# Patient Record
Sex: MALE | Race: WHITE | Employment: OTHER | URBAN - METROPOLITAN AREA
[De-identification: names, ages, dates, MRNs, and addresses within clinical notes are randomized per-mention and may not be internally consistent; named-entity substitution may affect disease eponyms.]

---

## 2018-02-14 PROBLEM — E53.8 FOLATE DEFICIENCY: Status: ACTIVE | Noted: 2018-02-14

## 2018-02-14 PROBLEM — B19.20 HEPATITIS C VIRUS INFECTION WITHOUT HEPATIC COMA: Status: ACTIVE | Noted: 2018-02-14

## 2018-02-14 PROBLEM — F17.210 CIGARETTE NICOTINE DEPENDENCE WITHOUT COMPLICATION: Status: ACTIVE | Noted: 2018-02-14

## 2018-02-14 PROBLEM — K21.9 GASTROESOPHAGEAL REFLUX DISEASE: Status: ACTIVE | Noted: 2018-02-14

## 2018-02-14 PROBLEM — E55.9 VITAMIN D DEFICIENCY: Status: ACTIVE | Noted: 2018-02-14

## 2018-02-14 PROBLEM — E78.1 HYPERTRIGLYCERIDEMIA: Status: ACTIVE | Noted: 2018-02-14

## 2018-02-27 PROBLEM — K21.9 GASTROESOPHAGEAL REFLUX DISEASE: Status: ACTIVE | Noted: 2018-02-27

## 2018-03-02 PROBLEM — K21.9 GASTROESOPHAGEAL REFLUX DISEASE: Chronic | Status: ACTIVE | Noted: 2018-02-14

## 2018-03-02 PROBLEM — E55.9 VITAMIN D DEFICIENCY: Chronic | Status: ACTIVE | Noted: 2018-02-14

## 2018-03-02 PROBLEM — E78.1 HYPERTRIGLYCERIDEMIA: Chronic | Status: ACTIVE | Noted: 2018-02-14

## 2018-03-02 PROBLEM — R07.89 LEFT-SIDED CHEST WALL PAIN: Status: ACTIVE | Noted: 2018-03-02

## 2018-03-02 PROBLEM — J11.1 BRONCHITIS WITH INFLUENZA: Status: ACTIVE | Noted: 2018-03-02

## 2018-03-02 PROBLEM — J44.1 COPD WITH ACUTE EXACERBATION (HCC): Status: ACTIVE | Noted: 2018-03-02

## 2018-03-02 PROBLEM — F31.9 BIPOLAR DISORDER WITH DEPRESSION (HCC): Chronic | Status: ACTIVE | Noted: 2018-03-02

## 2018-03-15 ENCOUNTER — OFFICE VISIT (OUTPATIENT)
Dept: FAMILY MEDICINE CLINIC | Age: 53
End: 2018-03-15
Payer: COMMERCIAL

## 2018-03-15 ENCOUNTER — HOSPITAL ENCOUNTER (OUTPATIENT)
Age: 53
Discharge: HOME | End: 2018-03-17
Payer: COMMERCIAL

## 2018-03-15 VITALS
DIASTOLIC BLOOD PRESSURE: 60 MMHG | WEIGHT: 229 LBS | HEART RATE: 97 BPM | SYSTOLIC BLOOD PRESSURE: 116 MMHG | HEIGHT: 71 IN | OXYGEN SATURATION: 95 % | BODY MASS INDEX: 32.06 KG/M2

## 2018-03-15 DIAGNOSIS — R79.89 ELEVATED SERUM CREATININE: ICD-10-CM

## 2018-03-15 DIAGNOSIS — F17.210 CIGARETTE NICOTINE DEPENDENCE WITHOUT COMPLICATION: Primary | ICD-10-CM

## 2018-03-15 DIAGNOSIS — R09.81 NASAL CONGESTION: ICD-10-CM

## 2018-03-15 DIAGNOSIS — R79.89 ELEVATED LFTS: ICD-10-CM

## 2018-03-15 DIAGNOSIS — R05.8 PRODUCTIVE COUGH: ICD-10-CM

## 2018-03-15 LAB
ALBUMIN SERPL-MCNC: 4.4 G/DL (ref 3.5–5.2)
ALP BLD-CCNC: 62 U/L (ref 40–129)
ALT SERPL-CCNC: 29 U/L (ref 0–40)
ANION GAP SERPL CALCULATED.3IONS-SCNC: 15 MMOL/L (ref 7–16)
AST SERPL-CCNC: 20 U/L (ref 0–39)
BILIRUB SERPL-MCNC: 0.3 MG/DL (ref 0–1.2)
BUN BLDV-MCNC: 10 MG/DL (ref 6–20)
CALCIUM SERPL-MCNC: 9.6 MG/DL (ref 8.6–10.2)
CHLORIDE BLD-SCNC: 100 MMOL/L (ref 98–107)
CO2: 22 MMOL/L (ref 22–29)
CREAT SERPL-MCNC: 1 MG/DL (ref 0.7–1.2)
GFR AFRICAN AMERICAN: >60
GFR NON-AFRICAN AMERICAN: >60 ML/MIN/1.73
GLUCOSE BLD-MCNC: 92 MG/DL (ref 74–109)
POTASSIUM SERPL-SCNC: 4.6 MMOL/L (ref 3.5–5)
SODIUM BLD-SCNC: 137 MMOL/L (ref 132–146)
TOTAL PROTEIN: 7.6 G/DL (ref 6.4–8.3)

## 2018-03-15 PROCEDURE — G8417 CALC BMI ABV UP PARAM F/U: HCPCS | Performed by: FAMILY MEDICINE

## 2018-03-15 PROCEDURE — 1111F DSCHRG MED/CURRENT MED MERGE: CPT | Performed by: FAMILY MEDICINE

## 2018-03-15 PROCEDURE — 1036F TOBACCO NON-USER: CPT | Performed by: FAMILY MEDICINE

## 2018-03-15 PROCEDURE — G8482 FLU IMMUNIZE ORDER/ADMIN: HCPCS | Performed by: FAMILY MEDICINE

## 2018-03-15 PROCEDURE — 80053 COMPREHEN METABOLIC PANEL: CPT

## 2018-03-15 PROCEDURE — 3017F COLORECTAL CA SCREEN DOC REV: CPT | Performed by: FAMILY MEDICINE

## 2018-03-15 PROCEDURE — 99213 OFFICE O/P EST LOW 20 MIN: CPT | Performed by: FAMILY MEDICINE

## 2018-03-15 PROCEDURE — G8427 DOCREV CUR MEDS BY ELIG CLIN: HCPCS | Performed by: FAMILY MEDICINE

## 2018-03-15 RX ORDER — GUAIFENESIN 600 MG/1
600 TABLET, EXTENDED RELEASE ORAL 2 TIMES DAILY PRN
Qty: 20 TABLET | Refills: 0 | Status: SHIPPED | OUTPATIENT
Start: 2018-03-15 | End: 2018-04-10

## 2018-03-15 ASSESSMENT — ENCOUNTER SYMPTOMS
COUGH: 1
DIARRHEA: 0
SORE THROAT: 0
TROUBLE SWALLOWING: 0
ABDOMINAL PAIN: 0
SHORTNESS OF BREATH: 1

## 2018-03-26 ENCOUNTER — TELEPHONE (OUTPATIENT)
Dept: FAMILY MEDICINE CLINIC | Age: 53
End: 2018-03-26

## 2018-04-10 ENCOUNTER — OFFICE VISIT (OUTPATIENT)
Dept: FAMILY MEDICINE CLINIC | Age: 53
End: 2018-04-10
Payer: COMMERCIAL

## 2018-04-10 VITALS
OXYGEN SATURATION: 98 % | HEIGHT: 71 IN | BODY MASS INDEX: 32.34 KG/M2 | HEART RATE: 80 BPM | SYSTOLIC BLOOD PRESSURE: 112 MMHG | DIASTOLIC BLOOD PRESSURE: 62 MMHG | WEIGHT: 231 LBS

## 2018-04-10 DIAGNOSIS — K21.9 GASTROESOPHAGEAL REFLUX DISEASE, ESOPHAGITIS PRESENCE NOT SPECIFIED: Chronic | ICD-10-CM

## 2018-04-10 DIAGNOSIS — R79.89 ELEVATED SERUM CREATININE: Primary | ICD-10-CM

## 2018-04-10 DIAGNOSIS — F31.9 BIPOLAR DISORDER WITH DEPRESSION (HCC): Chronic | ICD-10-CM

## 2018-04-10 DIAGNOSIS — E55.9 VITAMIN D DEFICIENCY: Chronic | ICD-10-CM

## 2018-04-10 DIAGNOSIS — R79.89 ELEVATED LFTS: ICD-10-CM

## 2018-04-10 DIAGNOSIS — F17.210 CIGARETTE NICOTINE DEPENDENCE WITHOUT COMPLICATION: ICD-10-CM

## 2018-04-10 PROBLEM — J44.9 COPD (CHRONIC OBSTRUCTIVE PULMONARY DISEASE) (HCC): Chronic | Status: ACTIVE | Noted: 2018-03-02

## 2018-04-10 PROBLEM — J44.9 COPD (CHRONIC OBSTRUCTIVE PULMONARY DISEASE) (HCC): Status: ACTIVE | Noted: 2018-03-02

## 2018-04-10 PROBLEM — B19.20 HEPATITIS C VIRUS INFECTION WITHOUT HEPATIC COMA: Status: RESOLVED | Noted: 2018-02-14 | Resolved: 2018-04-10

## 2018-04-10 PROCEDURE — G8428 CUR MEDS NOT DOCUMENT: HCPCS | Performed by: FAMILY MEDICINE

## 2018-04-10 PROCEDURE — G8417 CALC BMI ABV UP PARAM F/U: HCPCS | Performed by: FAMILY MEDICINE

## 2018-04-10 PROCEDURE — 1036F TOBACCO NON-USER: CPT | Performed by: FAMILY MEDICINE

## 2018-04-10 PROCEDURE — 99213 OFFICE O/P EST LOW 20 MIN: CPT | Performed by: FAMILY MEDICINE

## 2018-04-10 PROCEDURE — 3017F COLORECTAL CA SCREEN DOC REV: CPT | Performed by: FAMILY MEDICINE

## 2018-04-10 RX ORDER — ONDANSETRON 4 MG/1
4 TABLET, FILM COATED ORAL EVERY 8 HOURS PRN
Qty: 20 TABLET | Refills: 0 | Status: SHIPPED
Start: 2018-04-10 | End: 2020-06-11 | Stop reason: SDUPTHER

## 2018-04-10 RX ORDER — TRAZODONE HYDROCHLORIDE 100 MG/1
100 TABLET ORAL NIGHTLY
COMMUNITY

## 2018-04-10 RX ORDER — VENLAFAXINE 75 MG/1
150 TABLET ORAL DAILY
COMMUNITY
End: 2021-01-14

## 2018-04-10 RX ORDER — HYDROXYZINE HYDROCHLORIDE 25 MG/1
25 TABLET, FILM COATED ORAL 3 TIMES DAILY PRN
COMMUNITY
End: 2018-08-31

## 2018-04-10 RX ORDER — ACETAMINOPHEN 160 MG
2000 TABLET,DISINTEGRATING ORAL DAILY
Qty: 30 CAPSULE | Refills: 5 | Status: SHIPPED | OUTPATIENT
Start: 2018-04-10 | End: 2018-10-15 | Stop reason: SDUPTHER

## 2018-04-10 RX ORDER — PANTOPRAZOLE SODIUM 40 MG/1
40 TABLET, DELAYED RELEASE ORAL
Qty: 30 TABLET | Refills: 3 | Status: SHIPPED | OUTPATIENT
Start: 2018-04-10 | End: 2019-01-11 | Stop reason: SDUPTHER

## 2018-04-10 RX ORDER — ONDANSETRON 4 MG/1
4 TABLET, FILM COATED ORAL EVERY 8 HOURS PRN
COMMUNITY
End: 2018-04-10 | Stop reason: SDUPTHER

## 2018-04-10 ASSESSMENT — ENCOUNTER SYMPTOMS
SHORTNESS OF BREATH: 0
ABDOMINAL PAIN: 0
BLOOD IN STOOL: 0

## 2018-07-05 ENCOUNTER — OFFICE VISIT (OUTPATIENT)
Dept: FAMILY MEDICINE CLINIC | Age: 53
End: 2018-07-05
Payer: COMMERCIAL

## 2018-07-05 VITALS
WEIGHT: 221 LBS | SYSTOLIC BLOOD PRESSURE: 116 MMHG | OXYGEN SATURATION: 94 % | DIASTOLIC BLOOD PRESSURE: 64 MMHG | HEART RATE: 72 BPM | BODY MASS INDEX: 30.82 KG/M2

## 2018-07-05 DIAGNOSIS — R09.81 NASAL CONGESTION: ICD-10-CM

## 2018-07-05 DIAGNOSIS — J44.1 COPD EXACERBATION (HCC): Primary | ICD-10-CM

## 2018-07-05 PROCEDURE — 3017F COLORECTAL CA SCREEN DOC REV: CPT | Performed by: FAMILY MEDICINE

## 2018-07-05 PROCEDURE — G8417 CALC BMI ABV UP PARAM F/U: HCPCS | Performed by: FAMILY MEDICINE

## 2018-07-05 PROCEDURE — 3023F SPIROM DOC REV: CPT | Performed by: FAMILY MEDICINE

## 2018-07-05 PROCEDURE — G8427 DOCREV CUR MEDS BY ELIG CLIN: HCPCS | Performed by: FAMILY MEDICINE

## 2018-07-05 PROCEDURE — 99213 OFFICE O/P EST LOW 20 MIN: CPT | Performed by: FAMILY MEDICINE

## 2018-07-05 PROCEDURE — 1036F TOBACCO NON-USER: CPT | Performed by: FAMILY MEDICINE

## 2018-07-05 PROCEDURE — G8926 SPIRO NO PERF OR DOC: HCPCS | Performed by: FAMILY MEDICINE

## 2018-07-05 RX ORDER — BENZONATATE 100 MG/1
100 CAPSULE ORAL 3 TIMES DAILY PRN
Qty: 21 CAPSULE | Refills: 0 | Status: SHIPPED | OUTPATIENT
Start: 2018-07-05 | End: 2018-07-12

## 2018-07-05 RX ORDER — METHYLPREDNISOLONE 4 MG/1
TABLET ORAL
Qty: 1 KIT | Refills: 0 | Status: SHIPPED | OUTPATIENT
Start: 2018-07-05 | End: 2018-07-11

## 2018-07-05 RX ORDER — HYDROXYZINE PAMOATE 50 MG/1
50 CAPSULE ORAL 4 TIMES DAILY PRN
COMMUNITY
End: 2019-12-11

## 2018-07-05 RX ORDER — AZITHROMYCIN 250 MG/1
TABLET, FILM COATED ORAL
Qty: 1 PACKET | Refills: 0 | Status: SHIPPED | OUTPATIENT
Start: 2018-07-05 | End: 2018-07-09

## 2018-07-05 RX ORDER — FLUTICASONE PROPIONATE 50 MCG
1 SPRAY, SUSPENSION (ML) NASAL DAILY
Qty: 1 BOTTLE | Refills: 3 | Status: SHIPPED
Start: 2018-07-05 | End: 2020-09-02 | Stop reason: CLARIF

## 2018-07-05 RX ORDER — LORAZEPAM 0.5 MG/1
0.5 TABLET ORAL DAILY
COMMUNITY
End: 2018-08-31

## 2018-07-05 ASSESSMENT — ENCOUNTER SYMPTOMS
SORE THROAT: 0
COUGH: 1
WHEEZING: 1
RHINORRHEA: 1
SHORTNESS OF BREATH: 1
SINUS PRESSURE: 1

## 2018-07-05 NOTE — PROGRESS NOTES
Subjective:      Patient ID: Ester Hernandez is a 46 y.o. male. Chief Complaint   Patient presents with    Cough     x4 days      HPI    Cough: Onset four days ago. Stable since onset. Cough is productive of green sputum. Took dayquil with minimal effect. Admits to worsening of chronic shortness of breath, chills, rhinorrhea with yellow-green discharge, sinus pressure, sensation that the ears are plugged. Denies myalgia, sore throat. Sick contacts include his niece who had similar symptoms and was treated with antibiotics. Patient is a current smoker, has known history of COPD and reports using rescue inhaler 34 times per day on average. Review of Systems   Constitutional: Positive for chills and fatigue. HENT: Positive for congestion, rhinorrhea and sinus pressure. Negative for sore throat. Respiratory: Positive for cough, shortness of breath and wheezing. Cardiovascular: Negative for chest pain and leg swelling. Musculoskeletal: Negative for myalgias. Objective:   Physical Exam   Constitutional: He is oriented to person, place, and time. No distress. HENT:   Head: Normocephalic and atraumatic. Right Ear: External ear normal. Tympanic membrane is not erythematous and not bulging. Left Ear: External ear normal. Tympanic membrane is not erythematous and not bulging. Nose: Mucosal edema present. Right sinus exhibits maxillary sinus tenderness and frontal sinus tenderness. Left sinus exhibits maxillary sinus tenderness and frontal sinus tenderness. Mouth/Throat: Uvula is midline, oropharynx is clear and moist and mucous membranes are normal.   Eyes: Conjunctivae are normal.   Neck: Neck supple. Pulmonary/Chest: He has decreased breath sounds. He has wheezes. He has no rhonchi. Abdominal: Soft. Bowel sounds are normal. There is no tenderness. Musculoskeletal: He exhibits no edema. Lymphadenopathy:     He has no cervical adenopathy.    Neurological: He is alert and oriented to person, place, and time. Skin: He is not diaphoretic. /64 (Site: Left Arm, Position: Sitting, Cuff Size: Large Adult)   Pulse 72   Wt 221 lb (100.2 kg)   SpO2 94%   BMI 30.82 kg/m²     Assessment / Plan:      1. COPD exacerbation (HCC)  Begin azithromycin, Medrol dose pack, trelegy. Tessalon Perles as needed for cough. Obtain chest x-ray today. - azithromycin (ZITHROMAX Z-DK) 250 MG tablet; Take 2 tablets (500 mg) on Day 1, and then take 1 tablet (250 mg) on days 2 through 5. Dispense: 1 packet; Refill: 0  - benzonatate (TESSALON PERLES) 100 MG capsule; Take 1 capsule by mouth 3 times daily as needed for Cough  Dispense: 21 capsule; Refill: 0  - XR CHEST STANDARD (2 VW); Future  - methylPREDNISolone (MEDROL DOSEPACK) 4 MG tablet; Take by mouth. Dispense: 1 kit; Refill: 0  - Fluticasone-Umeclidin-Vilant (Shyrl Lime) 100-62.5-25 MCG/INH AEPB; Inhale 1 Inhaler into the lungs daily  Dispense: 1 each; Refill: 3    2. Nasal congestion  Treatment as per #1, resume Flonase which he is currently out of.  - fluticasone (FLONASE) 50 MCG/ACT nasal spray; 1 spray by Nasal route daily  Dispense: 1 Bottle; Refill: 3    Return in about 7 days (around 7/12/2018).     Call or go to ED immediately if symptoms worsen or persist.    Educational materials and/or home exercises printed for patient's review and were included in patient instructions on his/her After Visit Summary and given to patient at the end of visit.       Counseled regarding above diagnosis, including possible risks and complications, especially if left uncontrolled.     Counseled regarding the possible side effects, risks, benefits and alternatives to treatment; patient and/or guardian verbalizes understanding, agrees, feels comfortable with and wishes to proceed with above treatment plan.     Advised patient to call with any new medication issues, and read all Rx info from pharmacy to assure aware of all possible risks and side effects of

## 2018-07-12 ENCOUNTER — OFFICE VISIT (OUTPATIENT)
Dept: FAMILY MEDICINE CLINIC | Age: 53
End: 2018-07-12
Payer: COMMERCIAL

## 2018-07-12 VITALS
HEART RATE: 71 BPM | DIASTOLIC BLOOD PRESSURE: 68 MMHG | OXYGEN SATURATION: 97 % | SYSTOLIC BLOOD PRESSURE: 118 MMHG | BODY MASS INDEX: 31.84 KG/M2 | HEIGHT: 71 IN | WEIGHT: 227.4 LBS

## 2018-07-12 DIAGNOSIS — E55.9 VITAMIN D DEFICIENCY: Chronic | ICD-10-CM

## 2018-07-12 DIAGNOSIS — E78.1 HYPERTRIGLYCERIDEMIA: Chronic | ICD-10-CM

## 2018-07-12 DIAGNOSIS — K21.9 GASTROESOPHAGEAL REFLUX DISEASE, ESOPHAGITIS PRESENCE NOT SPECIFIED: Chronic | ICD-10-CM

## 2018-07-12 DIAGNOSIS — E53.8 FOLATE DEFICIENCY: ICD-10-CM

## 2018-07-12 DIAGNOSIS — J44.9 CHRONIC OBSTRUCTIVE PULMONARY DISEASE, UNSPECIFIED COPD TYPE (HCC): Primary | Chronic | ICD-10-CM

## 2018-07-12 PROCEDURE — G8417 CALC BMI ABV UP PARAM F/U: HCPCS | Performed by: FAMILY MEDICINE

## 2018-07-12 PROCEDURE — G8427 DOCREV CUR MEDS BY ELIG CLIN: HCPCS | Performed by: FAMILY MEDICINE

## 2018-07-12 PROCEDURE — 3017F COLORECTAL CA SCREEN DOC REV: CPT | Performed by: FAMILY MEDICINE

## 2018-07-12 PROCEDURE — 1036F TOBACCO NON-USER: CPT | Performed by: FAMILY MEDICINE

## 2018-07-12 PROCEDURE — 99213 OFFICE O/P EST LOW 20 MIN: CPT | Performed by: FAMILY MEDICINE

## 2018-07-12 PROCEDURE — G8926 SPIRO NO PERF OR DOC: HCPCS | Performed by: FAMILY MEDICINE

## 2018-07-12 PROCEDURE — 3023F SPIROM DOC REV: CPT | Performed by: FAMILY MEDICINE

## 2018-07-12 ASSESSMENT — ENCOUNTER SYMPTOMS
SHORTNESS OF BREATH: 0
COUGH: 0
WHEEZING: 0
RHINORRHEA: 0

## 2018-07-12 NOTE — PROGRESS NOTES
Subjective:      Patient ID: Zion Mata is a 46 y.o. male. Chief Complaint   Patient presents with    COPD     pt here for follow up after last visit 7/5/18     HPI    Cough:   7/5/18: Onset four days ago. Stable since onset. Cough is productive of green sputum. Took dayquil with minimal effect. Admits to worsening of chronic shortness of breath, chills, rhinorrhea with yellow-green discharge, sinus pressure, sensation that the ears are plugged. Denies myalgia, sore throat. Sick contacts include his niece who had similar symptoms and was treated with antibiotics. Patient is a current smoker, has known history of COPD and reports using rescue inhaler 34 times per day on average. Today 7/12/18: Patient was dxd with COPD exacerbation and prescribed z pack, medrol dose pack, and trelegy. Patient reports acute symptoms have resolved and COPD symptoms are significantly improved compared to baseline. Has not needed albuterol since beginning trelegy. Patient had EGD scheduled with Dr. Alirio Parsons, this has been postponed to 7/16/18 d/t recent COPD exacerbation. Review of Systems   Constitutional: Negative for chills and fatigue. HENT: Negative for congestion and rhinorrhea. Eyes: Negative for visual disturbance. Respiratory: Negative for cough, shortness of breath and wheezing. Cardiovascular: Negative for chest pain and leg swelling. Psychiatric/Behavioral: Negative for dysphoric mood. Objective:   Physical Exam   Constitutional: He is oriented to person, place, and time. No distress. HENT:   Head: Normocephalic and atraumatic. Right Ear: External ear normal.   Left Ear: External ear normal.   Eyes: Conjunctivae are normal.   Neck: Neck supple. No thyromegaly present. Cardiovascular: Normal rate, regular rhythm, normal heart sounds and intact distal pulses. Pulmonary/Chest: Effort normal and breath sounds normal.   Abdominal: Soft. Bowel sounds are normal. There is no tenderness. Musculoskeletal: He exhibits no edema. Neurological: He is alert and oriented to person, place, and time. No cranial nerve deficit. Skin: Skin is warm and dry. He is not diaphoretic. Psychiatric: He has a normal mood and affect. His behavior is normal.     /68 (Site: Left Arm, Position: Sitting, Cuff Size: Large Adult)   Pulse 71   Ht 5' 10.98\" (1.803 m)   Wt 227 lb 6.4 oz (103.1 kg)   SpO2 97%   BMI 31.73 kg/m²     Assessment / Plan:      1. Chronic obstructive pulmonary disease, unspecified COPD type ([address]  Significantly improved since the addition of Trelegy, continue current treatment. - CBC Auto Differential; Future    2. Gastroesophageal reflux disease, esophagitis presence not specified  Obtain relevant lab work for review next visit. - CBC Auto Differential; Future    3. Vitamin D deficiency  Obtain relevant lab work for review next visit. - Vitamin D 25 Hydrox, D2 & D3; Future    4. Hypertriglyceridemia  Obtain relevant lab work for review next visit. - Comprehensive Metabolic Panel; Future  - Lipid Panel; Future    5. Folate deficiency  Obtain relevant lab work for review next visit. - CBC Auto Differential; Future  - Vitamin B12 & Folate; Future    Return in about 6 months (around 1/12/2019) for lab review.     Call or go to ED immediately if symptoms worsen or persist.    Educational materials and/or home exercises printed for patient's review and were included in patient instructions on his/her After Visit Summary and given to patient at the end of visit.       Counseled regarding above diagnosis, including possible risks and complications, especially if left uncontrolled.     Counseled regarding the possible side effects, risks, benefits and alternatives to treatment; patient and/or guardian verbalizes understanding, agrees, feels comfortable with and wishes to proceed with above treatment plan.     Advised patient to call with any new medication issues, and read all Rx info from pharmacy to assure aware of all possible risks and side effects of medication before taking.     Janet Kussmaul, DO  07/12/18  4:12 PM

## 2018-07-12 NOTE — PATIENT INSTRUCTIONS
Lab work instructions: Please get blood drawn for lab work 1 week prior to next visit. Bring lab order forms with you to the lobby at [facility], let them know you are there to get blood drawn and you will be sent back to the lab. The lab is open Monday through Friday 7:304:00. This is done on a walk-in basis and you do not need to schedule an appointment.

## 2018-08-22 DIAGNOSIS — E53.8 FOLATE DEFICIENCY: ICD-10-CM

## 2018-08-22 DIAGNOSIS — R05.8 PRODUCTIVE COUGH: ICD-10-CM

## 2018-08-22 RX ORDER — ALBUTEROL SULFATE 90 UG/1
2 AEROSOL, METERED RESPIRATORY (INHALATION) EVERY 6 HOURS PRN
Qty: 1 INHALER | Refills: 3 | Status: SHIPPED | OUTPATIENT
Start: 2018-08-22 | End: 2019-11-06 | Stop reason: SDUPTHER

## 2018-08-22 RX ORDER — FOLIC ACID 1 MG/1
1 TABLET ORAL DAILY
Qty: 30 TABLET | Refills: 5 | Status: SHIPPED | OUTPATIENT
Start: 2018-08-22 | End: 2019-02-01 | Stop reason: SDUPTHER

## 2018-08-30 ENCOUNTER — TELEPHONE (OUTPATIENT)
Dept: FAMILY MEDICINE CLINIC | Age: 53
End: 2018-08-30

## 2018-08-31 ENCOUNTER — OFFICE VISIT (OUTPATIENT)
Dept: FAMILY MEDICINE CLINIC | Age: 53
End: 2018-08-31
Payer: COMMERCIAL

## 2018-08-31 VITALS
DIASTOLIC BLOOD PRESSURE: 70 MMHG | BODY MASS INDEX: 31.53 KG/M2 | WEIGHT: 226 LBS | HEART RATE: 76 BPM | OXYGEN SATURATION: 96 % | SYSTOLIC BLOOD PRESSURE: 110 MMHG

## 2018-08-31 DIAGNOSIS — F17.210 CIGARETTE NICOTINE DEPENDENCE WITHOUT COMPLICATION: ICD-10-CM

## 2018-08-31 DIAGNOSIS — R05.8 PRODUCTIVE COUGH: Primary | ICD-10-CM

## 2018-08-31 PROCEDURE — G8427 DOCREV CUR MEDS BY ELIG CLIN: HCPCS | Performed by: FAMILY MEDICINE

## 2018-08-31 PROCEDURE — 3017F COLORECTAL CA SCREEN DOC REV: CPT | Performed by: FAMILY MEDICINE

## 2018-08-31 PROCEDURE — 1036F TOBACCO NON-USER: CPT | Performed by: FAMILY MEDICINE

## 2018-08-31 PROCEDURE — G8417 CALC BMI ABV UP PARAM F/U: HCPCS | Performed by: FAMILY MEDICINE

## 2018-08-31 PROCEDURE — 99213 OFFICE O/P EST LOW 20 MIN: CPT | Performed by: FAMILY MEDICINE

## 2018-08-31 RX ORDER — NICOTINE 21 MG/24HR
1 PATCH, TRANSDERMAL 24 HOURS TRANSDERMAL EVERY 24 HOURS
Qty: 30 PATCH | Refills: 3 | Status: SHIPPED | OUTPATIENT
Start: 2018-08-31 | End: 2018-12-23 | Stop reason: SDUPTHER

## 2018-08-31 RX ORDER — BENZONATATE 100 MG/1
100 CAPSULE ORAL 3 TIMES DAILY PRN
Qty: 21 CAPSULE | Refills: 0 | Status: SHIPPED | OUTPATIENT
Start: 2018-08-31 | End: 2018-09-07

## 2018-08-31 RX ORDER — DIPHENHYDRAMINE HCL 25 MG
25 TABLET ORAL EVERY 6 HOURS PRN
COMMUNITY
End: 2018-08-31

## 2018-08-31 RX ORDER — DIAZEPAM 2 MG/1
5 TABLET ORAL 2 TIMES DAILY
COMMUNITY
End: 2019-12-11

## 2018-08-31 RX ORDER — OLANZAPINE 5 MG/1
10 TABLET ORAL NIGHTLY
COMMUNITY
End: 2019-01-11

## 2018-08-31 RX ORDER — HYDROXYZINE 50 MG/1
50 TABLET, FILM COATED ORAL 3 TIMES DAILY PRN
Qty: 60 TABLET | Refills: 1 | Status: SHIPPED | OUTPATIENT
Start: 2018-08-31 | End: 2018-09-10

## 2018-08-31 ASSESSMENT — ENCOUNTER SYMPTOMS
RHINORRHEA: 1
CHEST TIGHTNESS: 0
TROUBLE SWALLOWING: 0
ABDOMINAL PAIN: 0
WHEEZING: 0
SINUS PRESSURE: 1
SORE THROAT: 1
SHORTNESS OF BREATH: 0
COUGH: 1

## 2018-08-31 NOTE — PROGRESS NOTES
Subjective:      Patient ID: Tiffany Hyatt is a 46 y.o. male. Chief Complaint   Patient presents with    Head Congestion     pt c/o sore throat x5days sinus congestion x3days and productive cough with yellow phlegm     HPI    Patient complains of cough. Symptoms include congestion and cough. Onset of symptoms was 6 days ago, gradually improving since that time. He also c/o subjective chills which have improved. Cough is productive of brownish, yellow sputum. He is drinking plenty of fluids. Evaluation to date: none. Treatment to date: none. Patient has history of COPD, symptoms remain well controlled since beginning trelegy and has needed to use rescue inhaler 12 times per week on average. Taking hydroxyzine 75mg tid, states this was prescribed during a previous admission for anxiety and has been continued although he has recently been prescribed Valium by psychiatry. States he is unsure that hydroxyzine is necessary but takes it 3 times daily every day. Nicotine dependence: Patient currently smokes 1 pack per day. He has some interest in cessation and plans to attempt cessation with his roommate in the near future. He would like prescription for nicotine patches. Review of Systems   Constitutional: Positive for chills. Negative for diaphoresis. HENT: Positive for congestion, rhinorrhea, sinus pressure and sore throat (improved, minimal). Negative for ear pain and trouble swallowing. Eyes: Negative for visual disturbance. Respiratory: Positive for cough. Negative for chest tightness, shortness of breath and wheezing. Cardiovascular: Negative for chest pain and leg swelling. Gastrointestinal: Negative for abdominal pain. Musculoskeletal: Negative for myalgias. Neurological: Positive for headaches. Objective:   Physical Exam   Constitutional: He is oriented to person, place, and time. No distress. HENT:   Head: Normocephalic and atraumatic.    Right Ear: External ear normal.

## 2018-10-05 ENCOUNTER — HOSPITAL ENCOUNTER (OUTPATIENT)
Age: 53
Discharge: HOME | End: 2018-10-07
Payer: COMMERCIAL

## 2018-10-05 ENCOUNTER — NURSE ONLY (OUTPATIENT)
Dept: FAMILY MEDICINE CLINIC | Age: 53
End: 2018-10-05
Payer: COMMERCIAL

## 2018-10-05 DIAGNOSIS — Z23 NEED FOR 23-POLYVALENT PNEUMOCOCCAL POLYSACCHARIDE VACCINE: Primary | ICD-10-CM

## 2018-10-05 LAB
ALBUMIN SERPL-MCNC: 4.3 G/DL (ref 3.5–5.2)
ALP BLD-CCNC: 70 U/L (ref 40–129)
ALT SERPL-CCNC: 10 U/L (ref 0–40)
ANION GAP SERPL CALCULATED.3IONS-SCNC: 16 MMOL/L (ref 7–16)
AST SERPL-CCNC: 14 U/L (ref 0–39)
BASOPHILS ABSOLUTE: 0.08 E9/L (ref 0–0.2)
BASOPHILS RELATIVE PERCENT: 1 % (ref 0–2)
BILIRUB SERPL-MCNC: 0.2 MG/DL (ref 0–1.2)
BUN BLDV-MCNC: 9 MG/DL (ref 6–20)
CALCIUM SERPL-MCNC: 9 MG/DL (ref 8.6–10.2)
CHLORIDE BLD-SCNC: 102 MMOL/L (ref 98–107)
CHOLESTEROL, TOTAL: 198 MG/DL (ref 0–199)
CO2: 22 MMOL/L (ref 22–29)
CREAT SERPL-MCNC: 0.9 MG/DL (ref 0.7–1.2)
EOSINOPHILS ABSOLUTE: 0.23 E9/L (ref 0.05–0.5)
EOSINOPHILS RELATIVE PERCENT: 2.8 % (ref 0–6)
GFR AFRICAN AMERICAN: >60
GFR NON-AFRICAN AMERICAN: >60 ML/MIN/1.73
GLUCOSE BLD-MCNC: 89 MG/DL (ref 74–109)
HCT VFR BLD CALC: 44.5 % (ref 37–54)
HDLC SERPL-MCNC: 32 MG/DL
HEMOGLOBIN: 14.2 G/DL (ref 12.5–16.5)
IMMATURE GRANULOCYTES #: 0.05 E9/L
IMMATURE GRANULOCYTES %: 0.6 % (ref 0–5)
LDL CHOLESTEROL CALCULATED: 101 MG/DL (ref 0–99)
LYMPHOCYTES ABSOLUTE: 2.13 E9/L (ref 1.5–4)
LYMPHOCYTES RELATIVE PERCENT: 25.8 % (ref 20–42)
MCH RBC QN AUTO: 29.2 PG (ref 26–35)
MCHC RBC AUTO-ENTMCNC: 31.9 % (ref 32–34.5)
MCV RBC AUTO: 91.4 FL (ref 80–99.9)
MONOCYTES ABSOLUTE: 1.08 E9/L (ref 0.1–0.95)
MONOCYTES RELATIVE PERCENT: 13.1 % (ref 2–12)
NEUTROPHILS ABSOLUTE: 4.7 E9/L (ref 1.8–7.3)
NEUTROPHILS RELATIVE PERCENT: 56.7 % (ref 43–80)
PDW BLD-RTO: 15 FL (ref 11.5–15)
PLATELET # BLD: 295 E9/L (ref 130–450)
PMV BLD AUTO: 9.3 FL (ref 7–12)
POTASSIUM SERPL-SCNC: 4.7 MMOL/L (ref 3.5–5)
RBC # BLD: 4.87 E12/L (ref 3.8–5.8)
SODIUM BLD-SCNC: 140 MMOL/L (ref 132–146)
T3 TOTAL: 106 NG/DL (ref 80–200)
T4 FREE: 0.85 NG/DL (ref 0.93–1.7)
TOTAL PROTEIN: 6.9 G/DL (ref 6.4–8.3)
TRIGL SERPL-MCNC: 325 MG/DL (ref 0–149)
TSH SERPL DL<=0.05 MIU/L-ACNC: 1.4 UIU/ML (ref 0.27–4.2)
VLDLC SERPL CALC-MCNC: 65 MG/DL
WBC # BLD: 8.3 E9/L (ref 4.5–11.5)

## 2018-10-05 PROCEDURE — 80061 LIPID PANEL: CPT

## 2018-10-05 PROCEDURE — 36415 COLL VENOUS BLD VENIPUNCTURE: CPT

## 2018-10-05 PROCEDURE — 84443 ASSAY THYROID STIM HORMONE: CPT

## 2018-10-05 PROCEDURE — 84480 ASSAY TRIIODOTHYRONINE (T3): CPT

## 2018-10-05 PROCEDURE — 85025 COMPLETE CBC W/AUTO DIFF WBC: CPT

## 2018-10-05 PROCEDURE — 90732 PPSV23 VACC 2 YRS+ SUBQ/IM: CPT | Performed by: FAMILY MEDICINE

## 2018-10-05 PROCEDURE — G0009 ADMIN PNEUMOCOCCAL VACCINE: HCPCS | Performed by: FAMILY MEDICINE

## 2018-10-05 PROCEDURE — 84439 ASSAY OF FREE THYROXINE: CPT

## 2018-10-05 PROCEDURE — 80053 COMPREHEN METABOLIC PANEL: CPT

## 2018-10-15 DIAGNOSIS — E55.9 VITAMIN D DEFICIENCY: Chronic | ICD-10-CM

## 2018-10-15 RX ORDER — ACETAMINOPHEN 160 MG
TABLET,DISINTEGRATING ORAL
Qty: 30 CAPSULE | Refills: 4 | Status: SHIPPED | OUTPATIENT
Start: 2018-10-15 | End: 2019-02-26 | Stop reason: SDUPTHER

## 2018-10-16 DIAGNOSIS — E53.8 VITAMIN B 12 DEFICIENCY: ICD-10-CM

## 2018-10-23 ENCOUNTER — OFFICE VISIT (OUTPATIENT)
Dept: FAMILY MEDICINE CLINIC | Age: 53
End: 2018-10-23
Payer: COMMERCIAL

## 2018-10-23 VITALS
BODY MASS INDEX: 32.65 KG/M2 | OXYGEN SATURATION: 95 % | HEART RATE: 73 BPM | WEIGHT: 234 LBS | DIASTOLIC BLOOD PRESSURE: 82 MMHG | SYSTOLIC BLOOD PRESSURE: 132 MMHG

## 2018-10-23 DIAGNOSIS — R20.2 PARESTHESIA OF FINGER: Primary | ICD-10-CM

## 2018-10-23 PROCEDURE — G8484 FLU IMMUNIZE NO ADMIN: HCPCS | Performed by: FAMILY MEDICINE

## 2018-10-23 PROCEDURE — 1036F TOBACCO NON-USER: CPT | Performed by: FAMILY MEDICINE

## 2018-10-23 PROCEDURE — 99213 OFFICE O/P EST LOW 20 MIN: CPT | Performed by: FAMILY MEDICINE

## 2018-10-23 PROCEDURE — 3017F COLORECTAL CA SCREEN DOC REV: CPT | Performed by: FAMILY MEDICINE

## 2018-10-23 PROCEDURE — G8427 DOCREV CUR MEDS BY ELIG CLIN: HCPCS | Performed by: FAMILY MEDICINE

## 2018-10-23 PROCEDURE — G8417 CALC BMI ABV UP PARAM F/U: HCPCS | Performed by: FAMILY MEDICINE

## 2018-10-23 ASSESSMENT — ENCOUNTER SYMPTOMS
COLOR CHANGE: 0
SHORTNESS OF BREATH: 0

## 2018-10-25 DIAGNOSIS — J44.1 COPD EXACERBATION (HCC): ICD-10-CM

## 2018-10-25 DIAGNOSIS — R09.81 NASAL CONGESTION: ICD-10-CM

## 2018-12-23 DIAGNOSIS — F17.210 CIGARETTE NICOTINE DEPENDENCE WITHOUT COMPLICATION: ICD-10-CM

## 2018-12-26 RX ORDER — NICOTINE 21 MG/24HR
1 PATCH, TRANSDERMAL 24 HOURS TRANSDERMAL EVERY 24 HOURS
Qty: 30 PATCH | Refills: 2 | Status: SHIPPED | OUTPATIENT
Start: 2018-12-26 | End: 2019-03-19 | Stop reason: SDUPTHER

## 2019-01-11 ENCOUNTER — OFFICE VISIT (OUTPATIENT)
Dept: FAMILY MEDICINE CLINIC | Age: 54
End: 2019-01-11
Payer: COMMERCIAL

## 2019-01-11 ENCOUNTER — HOSPITAL ENCOUNTER (OUTPATIENT)
Age: 54
Discharge: HOME | End: 2019-01-13
Payer: COMMERCIAL

## 2019-01-11 VITALS
BODY MASS INDEX: 34.96 KG/M2 | HEIGHT: 69 IN | WEIGHT: 236 LBS | SYSTOLIC BLOOD PRESSURE: 114 MMHG | DIASTOLIC BLOOD PRESSURE: 78 MMHG | HEART RATE: 87 BPM | OXYGEN SATURATION: 97 %

## 2019-01-11 DIAGNOSIS — Z12.5 PROSTATE CANCER SCREENING: ICD-10-CM

## 2019-01-11 DIAGNOSIS — E78.1 HYPERTRIGLYCERIDEMIA: Chronic | ICD-10-CM

## 2019-01-11 DIAGNOSIS — J44.9 CHRONIC OBSTRUCTIVE PULMONARY DISEASE, UNSPECIFIED COPD TYPE (HCC): Chronic | ICD-10-CM

## 2019-01-11 DIAGNOSIS — E55.9 VITAMIN D DEFICIENCY: Chronic | ICD-10-CM

## 2019-01-11 DIAGNOSIS — N39.43 DRIBBLING OF URINE: Primary | ICD-10-CM

## 2019-01-11 DIAGNOSIS — K21.9 GASTROESOPHAGEAL REFLUX DISEASE, ESOPHAGITIS PRESENCE NOT SPECIFIED: Chronic | ICD-10-CM

## 2019-01-11 DIAGNOSIS — E53.8 FOLATE DEFICIENCY: ICD-10-CM

## 2019-01-11 DIAGNOSIS — F17.210 CIGARETTE NICOTINE DEPENDENCE WITHOUT COMPLICATION: ICD-10-CM

## 2019-01-11 DIAGNOSIS — N39.43 DRIBBLING OF URINE: ICD-10-CM

## 2019-01-11 DIAGNOSIS — Z23 NEED FOR TDAP VACCINATION: ICD-10-CM

## 2019-01-11 LAB
ALBUMIN SERPL-MCNC: 4.7 G/DL (ref 3.5–5.2)
ALP BLD-CCNC: 75 U/L (ref 40–129)
ALT SERPL-CCNC: 5 U/L (ref 0–40)
ANION GAP SERPL CALCULATED.3IONS-SCNC: 17 MMOL/L (ref 7–16)
AST SERPL-CCNC: 13 U/L (ref 0–39)
BASOPHILS ABSOLUTE: 0.07 E9/L (ref 0–0.2)
BASOPHILS RELATIVE PERCENT: 0.7 % (ref 0–2)
BILIRUB SERPL-MCNC: 0.2 MG/DL (ref 0–1.2)
BILIRUBIN URINE: NEGATIVE
BLOOD, URINE: NEGATIVE
BUN BLDV-MCNC: 8 MG/DL (ref 6–20)
CALCIUM SERPL-MCNC: 9.2 MG/DL (ref 8.6–10.2)
CHLORIDE BLD-SCNC: 99 MMOL/L (ref 98–107)
CHOLESTEROL, TOTAL: 242 MG/DL (ref 0–199)
CLARITY: CLEAR
CO2: 25 MMOL/L (ref 22–29)
COLOR: YELLOW
CREAT SERPL-MCNC: 1 MG/DL (ref 0.7–1.2)
EOSINOPHILS ABSOLUTE: 0.3 E9/L (ref 0.05–0.5)
EOSINOPHILS RELATIVE PERCENT: 3.1 % (ref 0–6)
FOLATE: >20 NG/ML (ref 4.8–24.2)
GFR AFRICAN AMERICAN: >60
GFR NON-AFRICAN AMERICAN: >60 ML/MIN/1.73
GLUCOSE BLD-MCNC: 92 MG/DL (ref 74–99)
GLUCOSE URINE: NEGATIVE MG/DL
HCT VFR BLD CALC: 50.1 % (ref 37–54)
HDLC SERPL-MCNC: 35 MG/DL
HEMOGLOBIN: 16.1 G/DL (ref 12.5–16.5)
IMMATURE GRANULOCYTES #: 0.04 E9/L
IMMATURE GRANULOCYTES %: 0.4 % (ref 0–5)
KETONES, URINE: NEGATIVE MG/DL
LDL CHOLESTEROL CALCULATED: 138 MG/DL (ref 0–99)
LEUKOCYTE ESTERASE, URINE: NEGATIVE
LYMPHOCYTES ABSOLUTE: 1.8 E9/L (ref 1.5–4)
LYMPHOCYTES RELATIVE PERCENT: 18.7 % (ref 20–42)
MCH RBC QN AUTO: 29.7 PG (ref 26–35)
MCHC RBC AUTO-ENTMCNC: 32.1 % (ref 32–34.5)
MCV RBC AUTO: 92.4 FL (ref 80–99.9)
MONOCYTES ABSOLUTE: 1.47 E9/L (ref 0.1–0.95)
MONOCYTES RELATIVE PERCENT: 15.3 % (ref 2–12)
NEUTROPHILS ABSOLUTE: 5.94 E9/L (ref 1.8–7.3)
NEUTROPHILS RELATIVE PERCENT: 61.8 % (ref 43–80)
NITRITE, URINE: NEGATIVE
PDW BLD-RTO: 15.3 FL (ref 11.5–15)
PH UA: 6.5 (ref 5–9)
PLATELET # BLD: 292 E9/L (ref 130–450)
PMV BLD AUTO: 9.2 FL (ref 7–12)
POTASSIUM SERPL-SCNC: 4.3 MMOL/L (ref 3.5–5)
PROSTATE SPECIFIC ANTIGEN: 0.83 NG/ML (ref 0–4)
PROTEIN UA: NEGATIVE MG/DL
RBC # BLD: 5.42 E12/L (ref 3.8–5.8)
SODIUM BLD-SCNC: 141 MMOL/L (ref 132–146)
SPECIFIC GRAVITY UA: 1.01 (ref 1–1.03)
TOTAL PROTEIN: 7.8 G/DL (ref 6.4–8.3)
TRIGL SERPL-MCNC: 344 MG/DL (ref 0–149)
UROBILINOGEN, URINE: 0.2 E.U./DL
VITAMIN B-12: 827 PG/ML (ref 211–946)
VITAMIN D 25-HYDROXY: 45 NG/ML (ref 30–100)
VLDLC SERPL CALC-MCNC: 69 MG/DL
WBC # BLD: 9.6 E9/L (ref 4.5–11.5)

## 2019-01-11 PROCEDURE — G8484 FLU IMMUNIZE NO ADMIN: HCPCS | Performed by: FAMILY MEDICINE

## 2019-01-11 PROCEDURE — 82607 VITAMIN B-12: CPT

## 2019-01-11 PROCEDURE — 80053 COMPREHEN METABOLIC PANEL: CPT

## 2019-01-11 PROCEDURE — 99214 OFFICE O/P EST MOD 30 MIN: CPT | Performed by: FAMILY MEDICINE

## 2019-01-11 PROCEDURE — 82306 VITAMIN D 25 HYDROXY: CPT

## 2019-01-11 PROCEDURE — 82746 ASSAY OF FOLIC ACID SERUM: CPT

## 2019-01-11 PROCEDURE — G8926 SPIRO NO PERF OR DOC: HCPCS | Performed by: FAMILY MEDICINE

## 2019-01-11 PROCEDURE — 81003 URINALYSIS AUTO W/O SCOPE: CPT

## 2019-01-11 PROCEDURE — 90471 IMMUNIZATION ADMIN: CPT | Performed by: FAMILY MEDICINE

## 2019-01-11 PROCEDURE — 3017F COLORECTAL CA SCREEN DOC REV: CPT | Performed by: FAMILY MEDICINE

## 2019-01-11 PROCEDURE — G8417 CALC BMI ABV UP PARAM F/U: HCPCS | Performed by: FAMILY MEDICINE

## 2019-01-11 PROCEDURE — 81003 URINALYSIS AUTO W/O SCOPE: CPT | Performed by: FAMILY MEDICINE

## 2019-01-11 PROCEDURE — G8427 DOCREV CUR MEDS BY ELIG CLIN: HCPCS | Performed by: FAMILY MEDICINE

## 2019-01-11 PROCEDURE — 85025 COMPLETE CBC W/AUTO DIFF WBC: CPT

## 2019-01-11 PROCEDURE — 3023F SPIROM DOC REV: CPT | Performed by: FAMILY MEDICINE

## 2019-01-11 PROCEDURE — G0103 PSA SCREENING: HCPCS

## 2019-01-11 PROCEDURE — 80061 LIPID PANEL: CPT

## 2019-01-11 PROCEDURE — 36415 COLL VENOUS BLD VENIPUNCTURE: CPT

## 2019-01-11 PROCEDURE — 1036F TOBACCO NON-USER: CPT | Performed by: FAMILY MEDICINE

## 2019-01-11 PROCEDURE — 90715 TDAP VACCINE 7 YRS/> IM: CPT | Performed by: FAMILY MEDICINE

## 2019-01-11 RX ORDER — PANTOPRAZOLE SODIUM 40 MG/1
40 TABLET, DELAYED RELEASE ORAL
Qty: 30 TABLET | Refills: 5 | Status: SHIPPED | OUTPATIENT
Start: 2019-01-11 | End: 2019-07-14 | Stop reason: SDUPTHER

## 2019-01-11 RX ORDER — PAROXETINE HYDROCHLORIDE 20 MG/1
20 TABLET, FILM COATED ORAL DAILY
Refills: 5 | COMMUNITY
Start: 2019-01-07 | End: 2019-12-11

## 2019-01-11 RX ORDER — OLANZAPINE 10 MG/1
2.5 TABLET ORAL DAILY
Refills: 2 | COMMUNITY
Start: 2018-12-23 | End: 2020-09-18

## 2019-01-11 ASSESSMENT — ENCOUNTER SYMPTOMS
SHORTNESS OF BREATH: 0
BLOOD IN STOOL: 0
SORE THROAT: 0
COUGH: 0
ABDOMINAL PAIN: 0

## 2019-01-14 ENCOUNTER — TELEPHONE (OUTPATIENT)
Dept: FAMILY MEDICINE CLINIC | Age: 54
End: 2019-01-14

## 2019-01-31 ENCOUNTER — HOSPITAL ENCOUNTER (OUTPATIENT)
Age: 54
Discharge: HOME | End: 2019-02-02
Payer: COMMERCIAL

## 2019-01-31 LAB
ALBUMIN SERPL-MCNC: 4.7 G/DL (ref 3.5–5.2)
ALP BLD-CCNC: 76 U/L (ref 40–129)
ALT SERPL-CCNC: 14 U/L (ref 0–40)
ANION GAP SERPL CALCULATED.3IONS-SCNC: 17 MMOL/L (ref 7–16)
AST SERPL-CCNC: 18 U/L (ref 0–39)
BILIRUB SERPL-MCNC: 0.4 MG/DL (ref 0–1.2)
BUN BLDV-MCNC: 10 MG/DL (ref 6–20)
CALCIUM SERPL-MCNC: 9.7 MG/DL (ref 8.6–10.2)
CHLORIDE BLD-SCNC: 99 MMOL/L (ref 98–107)
CO2: 24 MMOL/L (ref 22–29)
CREAT SERPL-MCNC: 1.1 MG/DL (ref 0.7–1.2)
GFR AFRICAN AMERICAN: >60
GFR NON-AFRICAN AMERICAN: >60 ML/MIN/1.73
GLUCOSE BLD-MCNC: 80 MG/DL (ref 74–99)
POTASSIUM SERPL-SCNC: 4.4 MMOL/L (ref 3.5–5)
SODIUM BLD-SCNC: 140 MMOL/L (ref 132–146)
TOTAL PROTEIN: 8.5 G/DL (ref 6.4–8.3)

## 2019-01-31 PROCEDURE — 80053 COMPREHEN METABOLIC PANEL: CPT

## 2019-01-31 PROCEDURE — 87522 HEPATITIS C REVRS TRNSCRPJ: CPT

## 2019-01-31 PROCEDURE — 36415 COLL VENOUS BLD VENIPUNCTURE: CPT

## 2019-02-01 DIAGNOSIS — E53.8 FOLATE DEFICIENCY: ICD-10-CM

## 2019-02-03 RX ORDER — FOLIC ACID 1 MG/1
1 TABLET ORAL DAILY
Qty: 30 TABLET | Refills: 5 | Status: SHIPPED
Start: 2019-02-03 | End: 2020-09-02 | Stop reason: SDUPTHER

## 2019-02-04 LAB
HCV QNT BY NAAT IU/ML: NOT DETECTED IU/ML
HCV QNT BY NAAT LOG IU/ML: NOT DETECTED LOG IU/ML
INTERPRETATION: NOT DETECTED

## 2019-02-18 DIAGNOSIS — J44.1 COPD EXACERBATION (HCC): ICD-10-CM

## 2019-02-26 DIAGNOSIS — E55.9 VITAMIN D DEFICIENCY: Chronic | ICD-10-CM

## 2019-02-26 RX ORDER — ACETAMINOPHEN 160 MG
TABLET,DISINTEGRATING ORAL
Qty: 30 CAPSULE | Refills: 3 | Status: SHIPPED | OUTPATIENT
Start: 2019-02-26 | End: 2019-06-16 | Stop reason: SDUPTHER

## 2019-03-11 ASSESSMENT — ENCOUNTER SYMPTOMS
SHORTNESS OF BREATH: 0
ABDOMINAL PAIN: 0
BLOOD IN STOOL: 0
COUGH: 0

## 2019-03-12 ENCOUNTER — OFFICE VISIT (OUTPATIENT)
Dept: FAMILY MEDICINE CLINIC | Age: 54
End: 2019-03-12
Payer: COMMERCIAL

## 2019-03-12 VITALS
WEIGHT: 237 LBS | BODY MASS INDEX: 35.1 KG/M2 | HEIGHT: 69 IN | OXYGEN SATURATION: 96 % | DIASTOLIC BLOOD PRESSURE: 80 MMHG | SYSTOLIC BLOOD PRESSURE: 118 MMHG | HEART RATE: 75 BPM

## 2019-03-12 DIAGNOSIS — K21.9 GASTROESOPHAGEAL REFLUX DISEASE, ESOPHAGITIS PRESENCE NOT SPECIFIED: ICD-10-CM

## 2019-03-12 DIAGNOSIS — E78.5 DYSLIPIDEMIA: Primary | ICD-10-CM

## 2019-03-12 DIAGNOSIS — F17.210 CIGARETTE NICOTINE DEPENDENCE WITHOUT COMPLICATION: ICD-10-CM

## 2019-03-12 DIAGNOSIS — R73.03 PREDIABETES: ICD-10-CM

## 2019-03-12 DIAGNOSIS — J44.9 CHRONIC OBSTRUCTIVE PULMONARY DISEASE, UNSPECIFIED COPD TYPE (HCC): ICD-10-CM

## 2019-03-12 PROCEDURE — G8484 FLU IMMUNIZE NO ADMIN: HCPCS | Performed by: FAMILY MEDICINE

## 2019-03-12 PROCEDURE — G8417 CALC BMI ABV UP PARAM F/U: HCPCS | Performed by: FAMILY MEDICINE

## 2019-03-12 PROCEDURE — 3017F COLORECTAL CA SCREEN DOC REV: CPT | Performed by: FAMILY MEDICINE

## 2019-03-12 PROCEDURE — 1036F TOBACCO NON-USER: CPT | Performed by: FAMILY MEDICINE

## 2019-03-12 PROCEDURE — 99214 OFFICE O/P EST MOD 30 MIN: CPT | Performed by: FAMILY MEDICINE

## 2019-03-12 PROCEDURE — 3023F SPIROM DOC REV: CPT | Performed by: FAMILY MEDICINE

## 2019-03-12 PROCEDURE — G8427 DOCREV CUR MEDS BY ELIG CLIN: HCPCS | Performed by: FAMILY MEDICINE

## 2019-03-12 PROCEDURE — G8926 SPIRO NO PERF OR DOC: HCPCS | Performed by: FAMILY MEDICINE

## 2019-03-12 RX ORDER — LINACLOTIDE 72 UG/1
72 CAPSULE, GELATIN COATED ORAL DAILY
Refills: 4 | COMMUNITY
Start: 2019-02-11 | End: 2019-12-11

## 2019-03-12 RX ORDER — ATORVASTATIN CALCIUM 20 MG/1
20 TABLET, FILM COATED ORAL NIGHTLY
Qty: 30 TABLET | Refills: 3 | Status: SHIPPED | OUTPATIENT
Start: 2019-03-12 | End: 2019-06-28 | Stop reason: SDUPTHER

## 2019-03-21 ENCOUNTER — TELEPHONE (OUTPATIENT)
Dept: FAMILY MEDICINE CLINIC | Age: 54
End: 2019-03-21

## 2019-03-21 DIAGNOSIS — Z79.2 NEED FOR PROPHYLACTIC ANTIBIOTIC: Primary | ICD-10-CM

## 2019-03-21 RX ORDER — AZITHROMYCIN 500 MG/1
500 TABLET, FILM COATED ORAL ONCE
Qty: 1 TABLET | Refills: 0 | Status: SHIPPED | OUTPATIENT
Start: 2019-03-21 | End: 2019-03-26

## 2019-03-25 DIAGNOSIS — E53.8 VITAMIN B 12 DEFICIENCY: ICD-10-CM

## 2019-03-26 ENCOUNTER — TELEPHONE (OUTPATIENT)
Dept: FAMILY MEDICINE CLINIC | Age: 54
End: 2019-03-26

## 2019-03-26 DIAGNOSIS — Z79.2 NEED FOR PROPHYLACTIC ANTIBIOTIC: Primary | ICD-10-CM

## 2019-03-26 RX ORDER — AZITHROMYCIN 250 MG/1
TABLET, FILM COATED ORAL
Qty: 6 TABLET | Refills: 0 | Status: SHIPPED | OUTPATIENT
Start: 2019-03-26 | End: 2019-03-28

## 2019-03-27 ENCOUNTER — TELEPHONE (OUTPATIENT)
Dept: FAMILY MEDICINE CLINIC | Age: 54
End: 2019-03-27

## 2019-03-28 ENCOUNTER — OFFICE VISIT (OUTPATIENT)
Dept: FAMILY MEDICINE CLINIC | Age: 54
End: 2019-03-28
Payer: COMMERCIAL

## 2019-03-28 VITALS
BODY MASS INDEX: 34.96 KG/M2 | HEART RATE: 93 BPM | WEIGHT: 236 LBS | OXYGEN SATURATION: 98 % | SYSTOLIC BLOOD PRESSURE: 118 MMHG | DIASTOLIC BLOOD PRESSURE: 84 MMHG | HEIGHT: 69 IN

## 2019-03-28 DIAGNOSIS — Z79.2 NEED FOR PROPHYLACTIC ANTIBIOTIC: ICD-10-CM

## 2019-03-28 DIAGNOSIS — Z02.89 ENCOUNTER FOR COMPLETION OF FORM WITH PATIENT: Primary | ICD-10-CM

## 2019-03-28 PROCEDURE — G8484 FLU IMMUNIZE NO ADMIN: HCPCS | Performed by: FAMILY MEDICINE

## 2019-03-28 PROCEDURE — G8417 CALC BMI ABV UP PARAM F/U: HCPCS | Performed by: FAMILY MEDICINE

## 2019-03-28 PROCEDURE — G8427 DOCREV CUR MEDS BY ELIG CLIN: HCPCS | Performed by: FAMILY MEDICINE

## 2019-03-28 PROCEDURE — 99213 OFFICE O/P EST LOW 20 MIN: CPT | Performed by: FAMILY MEDICINE

## 2019-03-28 PROCEDURE — 3017F COLORECTAL CA SCREEN DOC REV: CPT | Performed by: FAMILY MEDICINE

## 2019-03-28 PROCEDURE — 1036F TOBACCO NON-USER: CPT | Performed by: FAMILY MEDICINE

## 2019-03-31 ASSESSMENT — ENCOUNTER SYMPTOMS
ABDOMINAL PAIN: 0
BLOOD IN STOOL: 0
COUGH: 0
SHORTNESS OF BREATH: 0

## 2019-05-15 DIAGNOSIS — F17.210 CIGARETTE NICOTINE DEPENDENCE WITHOUT COMPLICATION: ICD-10-CM

## 2019-05-15 RX ORDER — NICOTINE 21 MG/24HR
1 PATCH, TRANSDERMAL 24 HOURS TRANSDERMAL EVERY 24 HOURS
Qty: 30 PATCH | Refills: 0 | Status: SHIPPED | OUTPATIENT
Start: 2019-05-15 | End: 2019-12-11

## 2019-06-07 ENCOUNTER — HOSPITAL ENCOUNTER (OUTPATIENT)
Age: 54
Discharge: HOME | End: 2019-06-09
Payer: COMMERCIAL

## 2019-06-07 DIAGNOSIS — K21.9 GASTROESOPHAGEAL REFLUX DISEASE, ESOPHAGITIS PRESENCE NOT SPECIFIED: ICD-10-CM

## 2019-06-07 DIAGNOSIS — J44.9 CHRONIC OBSTRUCTIVE PULMONARY DISEASE, UNSPECIFIED COPD TYPE (HCC): ICD-10-CM

## 2019-06-07 DIAGNOSIS — R73.03 PREDIABETES: ICD-10-CM

## 2019-06-07 DIAGNOSIS — E78.5 DYSLIPIDEMIA: ICD-10-CM

## 2019-06-07 LAB
ALBUMIN SERPL-MCNC: 4.5 G/DL (ref 3.5–5.2)
ALP BLD-CCNC: 81 U/L (ref 40–129)
ALT SERPL-CCNC: 10 U/L (ref 0–40)
ANION GAP SERPL CALCULATED.3IONS-SCNC: 14 MMOL/L (ref 7–16)
AST SERPL-CCNC: 14 U/L (ref 0–39)
BASOPHILS ABSOLUTE: 0.05 E9/L (ref 0–0.2)
BASOPHILS RELATIVE PERCENT: 0.5 % (ref 0–2)
BILIRUB SERPL-MCNC: 0.2 MG/DL (ref 0–1.2)
BUN BLDV-MCNC: 7 MG/DL (ref 6–20)
CALCIUM SERPL-MCNC: 9.3 MG/DL (ref 8.6–10.2)
CHLORIDE BLD-SCNC: 101 MMOL/L (ref 98–107)
CHOLESTEROL, TOTAL: 148 MG/DL (ref 0–199)
CO2: 25 MMOL/L (ref 22–29)
CREAT SERPL-MCNC: 1.1 MG/DL (ref 0.7–1.2)
EOSINOPHILS ABSOLUTE: 0.35 E9/L (ref 0.05–0.5)
EOSINOPHILS RELATIVE PERCENT: 3.6 % (ref 0–6)
GFR AFRICAN AMERICAN: >60
GFR NON-AFRICAN AMERICAN: >60 ML/MIN/1.73
GLUCOSE BLD-MCNC: 110 MG/DL (ref 74–99)
HBA1C MFR BLD: 5.9 % (ref 4–5.6)
HCT VFR BLD CALC: 46.9 % (ref 37–54)
HDLC SERPL-MCNC: 30 MG/DL
HEMOGLOBIN: 15.3 G/DL (ref 12.5–16.5)
IMMATURE GRANULOCYTES #: 0.03 E9/L
IMMATURE GRANULOCYTES %: 0.3 % (ref 0–5)
LDL CHOLESTEROL CALCULATED: 57 MG/DL (ref 0–99)
LYMPHOCYTES ABSOLUTE: 2.82 E9/L (ref 1.5–4)
LYMPHOCYTES RELATIVE PERCENT: 29.2 % (ref 20–42)
MCH RBC QN AUTO: 31.1 PG (ref 26–35)
MCHC RBC AUTO-ENTMCNC: 32.6 % (ref 32–34.5)
MCV RBC AUTO: 95.3 FL (ref 80–99.9)
MONOCYTES ABSOLUTE: 0.95 E9/L (ref 0.1–0.95)
MONOCYTES RELATIVE PERCENT: 9.8 % (ref 2–12)
NEUTROPHILS ABSOLUTE: 5.47 E9/L (ref 1.8–7.3)
NEUTROPHILS RELATIVE PERCENT: 56.6 % (ref 43–80)
PDW BLD-RTO: 14.4 FL (ref 11.5–15)
PLATELET # BLD: 305 E9/L (ref 130–450)
PMV BLD AUTO: 9.5 FL (ref 7–12)
POTASSIUM SERPL-SCNC: 5.1 MMOL/L (ref 3.5–5)
RBC # BLD: 4.92 E12/L (ref 3.8–5.8)
SODIUM BLD-SCNC: 140 MMOL/L (ref 132–146)
TOTAL PROTEIN: 7.6 G/DL (ref 6.4–8.3)
TRIGL SERPL-MCNC: 305 MG/DL (ref 0–149)
VLDLC SERPL CALC-MCNC: 61 MG/DL
WBC # BLD: 9.7 E9/L (ref 4.5–11.5)

## 2019-06-07 PROCEDURE — 83036 HEMOGLOBIN GLYCOSYLATED A1C: CPT

## 2019-06-07 PROCEDURE — 80053 COMPREHEN METABOLIC PANEL: CPT

## 2019-06-07 PROCEDURE — 80061 LIPID PANEL: CPT

## 2019-06-07 PROCEDURE — 36415 COLL VENOUS BLD VENIPUNCTURE: CPT

## 2019-06-07 PROCEDURE — 85025 COMPLETE CBC W/AUTO DIFF WBC: CPT

## 2019-06-12 ENCOUNTER — OFFICE VISIT (OUTPATIENT)
Dept: FAMILY MEDICINE CLINIC | Age: 54
End: 2019-06-12
Payer: COMMERCIAL

## 2019-06-12 VITALS
WEIGHT: 234 LBS | DIASTOLIC BLOOD PRESSURE: 70 MMHG | OXYGEN SATURATION: 95 % | SYSTOLIC BLOOD PRESSURE: 110 MMHG | HEART RATE: 87 BPM | BODY MASS INDEX: 34.56 KG/M2

## 2019-06-12 DIAGNOSIS — E78.5 DYSLIPIDEMIA: Primary | ICD-10-CM

## 2019-06-12 DIAGNOSIS — Z87.891 PERSONAL HISTORY OF TOBACCO USE: ICD-10-CM

## 2019-06-12 DIAGNOSIS — J44.9 CHRONIC OBSTRUCTIVE PULMONARY DISEASE, UNSPECIFIED COPD TYPE (HCC): Chronic | ICD-10-CM

## 2019-06-12 DIAGNOSIS — F17.219 CIGARETTE NICOTINE DEPENDENCE WITH NICOTINE-INDUCED DISORDER: ICD-10-CM

## 2019-06-12 DIAGNOSIS — R73.03 PREDIABETES: ICD-10-CM

## 2019-06-12 DIAGNOSIS — K21.9 GASTROESOPHAGEAL REFLUX DISEASE, ESOPHAGITIS PRESENCE NOT SPECIFIED: Chronic | ICD-10-CM

## 2019-06-12 PROCEDURE — 3023F SPIROM DOC REV: CPT | Performed by: FAMILY MEDICINE

## 2019-06-12 PROCEDURE — G8427 DOCREV CUR MEDS BY ELIG CLIN: HCPCS | Performed by: FAMILY MEDICINE

## 2019-06-12 PROCEDURE — G0296 VISIT TO DETERM LDCT ELIG: HCPCS | Performed by: FAMILY MEDICINE

## 2019-06-12 PROCEDURE — G8417 CALC BMI ABV UP PARAM F/U: HCPCS | Performed by: FAMILY MEDICINE

## 2019-06-12 PROCEDURE — 3017F COLORECTAL CA SCREEN DOC REV: CPT | Performed by: FAMILY MEDICINE

## 2019-06-12 PROCEDURE — G8926 SPIRO NO PERF OR DOC: HCPCS | Performed by: FAMILY MEDICINE

## 2019-06-12 PROCEDURE — 1036F TOBACCO NON-USER: CPT | Performed by: FAMILY MEDICINE

## 2019-06-12 PROCEDURE — 99214 OFFICE O/P EST MOD 30 MIN: CPT | Performed by: FAMILY MEDICINE

## 2019-06-12 ASSESSMENT — ENCOUNTER SYMPTOMS
COUGH: 0
BLOOD IN STOOL: 0
SHORTNESS OF BREATH: 0
ABDOMINAL PAIN: 0

## 2019-06-12 NOTE — PROGRESS NOTES
Marcin Perdue   Patient is a 53y.o. year old male who presents with:  Chief Complaint   Patient presents with   [address]     labs done on 06/07/19     Patient also follows with: psychiatry, GI    HPI    COPD  Current treatment: trelegy, albuterol PRN. Recent changes in medications: none. Current symptoms include: chronic dyspnea. Patient has recently needed to use rescue inhaler rarely  Reports symptoms are well controlled    Dyslipidemia  Current treatment: atorvastatin 20mg daily  Recent changes in medications: treatment was started 3/2019   Indications for statin therapy include: hypertriglyceridemia, 40-74yo with LDL  and 10-year ASCVD >7.5% and no hx DM or ASCVD. Patient denies problems with the medication  Lab Results   Component Value Date    CHOL 148 06/07/2019    HDL 30 06/07/2019    LDLCALC 57 06/07/2019    TRIG 305 (H) 06/07/2019     Nicotine dependence  Patient currently smokes 7 cigarettes per day, down from 1/2 pack per day  Patient currently has some interest in quitting  Barriers to quitting include none  Current cessation aid: nicotine patches prescribed last visit, has been weaning down to 5 cig/day prior to attempting to stop   Past cessation aids include no other cessation aids     GERD  Current treatment: pantoprazole 40mg daily   Recent changes in medication: none. Patient reports symptoms are well controlled  Patient last had EGD 7/2018 showing minimal gastritis and barretts esophagus. Prediabetes  Current treatment: none  Patient denies history of diabetes or treatment with diabetic medications. Reports diet is not well controlled.    Plans to begin regular exercise at [address] [place] [address] at the [facility]  Lab Results   Component Value Date    LABA1C 5.9 (H) 06/07/2019    LABA1C 5.6 04/26/2017    LABA1C 5.7 07/02/2015    GLUCOSE 110 (H) 06/07/2019    GLUCOSE 80 01/31/2019    GLUCOSE 92 01/11/2019     Review of Systems   Constitutional: Negative for fatigue and unexpected fluticasone (FLONASE) 50 MCG/ACT nasal spray 1 spray by Nasal route daily 1 Bottle 3    venlafaxine (EFFEXOR) 75 MG tablet Take 150 mg by mouth daily       traZODone (DESYREL) 100 MG tablet Take 100 mg by mouth nightly      ondansetron (ZOFRAN) 4 MG tablet Take 1 tablet by mouth every 8 hours as needed for Nausea or Vomiting 20 tablet 0    Melatonin 5 MG CAPS Take 5 mg by mouth nightly as needed (insomnia) (Patient taking differently: Take 10 mg by mouth nightly as needed (insomnia) ) 30 capsule 1    lactulose (CHRONULAC) 10 GM/15ML solution Take 20 g by mouth 3 times daily as needed (Constipation)      LINZESS 72 MCG CAPS Take 72 mcg by mouth daily  4     No current facility-administered medications for this visit.         History    Medical      Diagnosis Date    COPD (chronic obstructive pulmonary disease) ([facility]     Depression     Hyperlipidemia        Surgical  Past Surgical History:   Procedure Laterality Date    CHOLECYSTECTOMY, LAPAROSCOPIC  7-6-15       Allergies  Allergies   Allergen Reactions    Clindamycin/Lincomycin Shortness Of Breath    Celexa [Citalopram Hydrobromide] Other (See Comments)     Severe mood swings    Linzess [Linaclotide] Diarrhea    Penicillins Hives    Ciprofloxacin Rash       Family      Problem Relation Age of Onset    Cancer Mother         breast cancer    High Blood Pressure Father        Social History     Socioeconomic History    Marital status:      Spouse name: Not on file    Number of children: Not on file    Years of education: Not on file    Highest education level: Not on file   Occupational History    Not on file   Social Needs    Financial resource strain: Not on file    Food insecurity:     Worry: Not on file     Inability: Not on file    Transportation needs:     Medical: Not on file     Non-medical: Not on file   Tobacco Use    Smoking status: Former Smoker     Packs/day: 2.00     Years: 42.00     Pack years: 84.00     Types: Cigarettes Last attempt to quit: 2018     Years since quittin.2    Smokeless tobacco: Never Used   Substance and Sexual Activity    Alcohol use: No     Alcohol/week: 0.0 oz    Drug use: Yes     Types: Marijuana     Comment: occasional last used 18    Sexual activity: Not Currently   Lifestyle    Physical activity:     Days per week: Not on file     Minutes per session: Not on file    Stress: Not on file   Relationships    Social connections:     Talks on phone: Not on file     Gets together: Not on file     Attends Bahai service: Not on file     Active member of club or organization: Not on file     Attends meetings of clubs or organizations: Not on file     Relationship status: Not on file    Intimate partner violence:     Fear of current or ex partner: Not on file     Emotionally abused: Not on file     Physically abused: Not on file     Forced sexual activity: Not on file   Other Topics Concern    Not on file   Social History Narrative    Not on file       OBJECTIVE    /70 (Site: Right Upper Arm, Position: Sitting, Cuff Size: Medium Adult)   Pulse 87   Wt 234 lb (106.1 kg)   SpO2 (!) 65%   BMI 34.56 kg/m²     Wt Readings from Last 3 Encounters:   19 234 lb (106.1 kg)   19 236 lb (107 kg)   19 237 lb (107.5 kg)     Physical Exam   Constitutional: He is oriented to person, place, and time. No distress. HENT:   Head: Normocephalic and atraumatic. Right Ear: External ear normal.   Left Ear: External ear normal.   edentulous   Eyes: Conjunctivae are normal.   Neck: Neck supple. Carotid bruit is not present. No thyromegaly present. Cardiovascular: Normal rate, regular rhythm, normal heart sounds and intact distal pulses. Pulmonary/Chest: Effort normal and breath sounds normal.   Abdominal: Soft. He exhibits no abdominal bruit. There is no tenderness. There is no guarding. Musculoskeletal: He exhibits no edema.    Neurological: He is alert and oriented to person, place, and time. Skin: Skin is warm and dry. He is not diaphoretic. Psychiatric: He has a normal mood and affect. His behavior is normal.     ASSESSMENT AND PLAN    1. Dyslipidemia  Significant interval improvement. Continue current tx. Address #4 which will also likely improve TG level. - Comprehensive Metabolic Panel; Future  - Lipid Panel; Future    2. Chronic obstructive pulmonary disease, unspecified COPD type ([address]  Controlled, continue current treatment. - Comprehensive Metabolic Panel; Future  - CBC Auto Differential; Future    3. Gastroesophageal reflux disease, esophagitis presence not specified  Controlled, continue current treatment. - Comprehensive Metabolic Panel; Future  - CBC Auto Differential; Future    4. Prediabetes  Discussed recommendations with regard to diet and exercise. Provided with relevant educational materials. Will continue to follow and reassess on repeat blood work. - Comprehensive Metabolic Panel; Future  - Hemoglobin A1C; Future    5. Cigarette nicotine dependence with nicotine-induced disorder  Commended and encouraged to continue to cut down with the goal of complete cessation.   - Comprehensive Metabolic Panel; Future  - CBC Auto Differential; Future    6. Personal history of tobacco use  - PA VISIT TO DISCUSS LUNG CA SCREEN W LDCT  - CT Lung Screening; Future    Return in about 6 months (around 12/12/2019) for lab review, or sooner as needed. , Labs are to be done one week prior to next visit. Byron Delgado DO  06/12/19  1:40 PM    Low Dose CT (LDCT) Lung Screening criteria met   Age 55-77   Pack year smoking >30   Still smoking or less than 15 year since quit   No sign or symptoms of lung cancer   > 11 months since last LDCT     Risks and benefits of lung cancer screening with LDCT scans discussed:    Significance of positive screen - False-positive LDCT results often occur. 95% of all positive results do not lead to a diagnosis of cancer.  Usually further

## 2019-06-12 NOTE — PATIENT INSTRUCTIONS
good, quick way to make sure that you have a balanced meal. It also helps you spread carbs throughout the day. ? Divide your plate by types of foods. Put non-starchy vegetables on half the plate, meat or other protein food on one-quarter of the plate, and a grain or starchy vegetable in the final quarter of the plate. To this you can add a small piece of fruit and 1 cup of milk or yogurt, depending on how many carbs you are supposed to eat at a meal.  · Try to eat about the same amount of carbs at each meal. Do not \"save up\" your daily allowance of carbs to eat at one meal.  · Proteins have very little or no carbs per serving. Examples of proteins are beef, chicken, turkey, fish, eggs, tofu, cheese, cottage cheese, and peanut butter. A serving size of meat is 3 ounces, which is about the size of a deck of cards. Examples of meat substitute serving sizes (equal to 1 ounce of meat) are 1/4 cup of cottage cheese, 1 egg, 1 tablespoon of peanut butter, and ½ cup of tofu. How can you eat out and still eat healthy? · Learn to estimate the serving sizes of foods that have carbohydrate. If you measure food at home, it will be easier to estimate the amount in a serving of restaurant food. · If the meal you order has too much carbohydrate (such as potatoes, corn, or baked beans), ask to have a low-carbohydrate food instead. Ask for a salad or green vegetables. · If you use insulin, check your blood sugar before and after eating out to help you plan how much to eat in the future. · If you eat more carbohydrate at a meal than you had planned, take a walk or do other exercise. This will help lower your blood sugar. What else should you know? · Limit saturated fat, such as the fat from meat and dairy products. This is a healthy choice because people who have diabetes are at higher risk of heart disease. So choose lean cuts of meat and nonfat or low-fat dairy products.  Use olive or canola oil instead of butter or shortening when cooking. · Don't skip meals. Your blood sugar may drop too low if you skip meals and take insulin or certain medicines for diabetes. · Check with your doctor before you drink alcohol. Alcohol can cause your blood sugar to drop too low. Alcohol can also cause a bad reaction if you take certain diabetes medicines. Follow-up care is a key part of your treatment and safety. Be sure to make and go to all appointments, and call your doctor if you are having problems. It's also a good idea to know your test results and keep a list of the medicines you take. Where can you learn more? Go to https://[employer] ([employer] minespepiceweb.[employer]. org and sign in to your [employer] account. Enter D707 in the First To File box to learn more about \"Learning About Diabetes Food Guidelines. \"     If you do not have an account, please click on the \"Sign Up Now\" link. Current as of: July 25, 2018  Content Version: 12.0  © 0670-8917 [employer]. Care instructions adapted under license by [facility] ([facility]). If you have questions about a medical condition or this instruction, always ask your healthcare professional. [address] any warranty or liability for your use of this information. What is lung cancer screening? Lung cancer screening is a way in which doctors check the lungs for early signs of cancer in people who have no symptoms of lung cancer. A low-dose CT scan uses much less radiation than a normal CT scan and shows a more detailed image of the lungs than a standard X-ray. The goal of lung cancer screening is to find cancer early, before it has a chance to grow, spread, or cause problems. One large study found that smokers who were screened with low-dose CT scans were less likely to die of lung cancer than those who were screened with standard X-ray. Below is a summary of the things you need to know regarding screening for lung cancer with low-dose computed tomography (LDCT).   This

## 2019-07-14 DIAGNOSIS — K21.9 GASTROESOPHAGEAL REFLUX DISEASE, ESOPHAGITIS PRESENCE NOT SPECIFIED: Chronic | ICD-10-CM

## 2019-07-16 RX ORDER — PANTOPRAZOLE SODIUM 40 MG/1
TABLET, DELAYED RELEASE ORAL
Qty: 30 TABLET | Refills: 4 | Status: SHIPPED
Start: 2019-07-16 | End: 2021-04-14 | Stop reason: SDUPTHER

## 2019-07-18 ENCOUNTER — HOSPITAL ENCOUNTER (OUTPATIENT)
Age: 54
Discharge: HOME | End: 2019-07-20
Payer: MEDICARE

## 2019-07-18 DIAGNOSIS — J44.9 CHRONIC OBSTRUCTIVE PULMONARY DISEASE, UNSPECIFIED COPD TYPE (HCC): Chronic | ICD-10-CM

## 2019-07-18 DIAGNOSIS — E78.5 DYSLIPIDEMIA: ICD-10-CM

## 2019-07-18 DIAGNOSIS — K21.9 GASTROESOPHAGEAL REFLUX DISEASE, ESOPHAGITIS PRESENCE NOT SPECIFIED: Chronic | ICD-10-CM

## 2019-07-18 DIAGNOSIS — F17.219 CIGARETTE NICOTINE DEPENDENCE WITH NICOTINE-INDUCED DISORDER: ICD-10-CM

## 2019-07-18 DIAGNOSIS — R73.03 PREDIABETES: ICD-10-CM

## 2019-07-18 LAB
ALBUMIN SERPL-MCNC: 4.6 G/DL (ref 3.5–5.2)
ALP BLD-CCNC: 77 U/L (ref 40–129)
ALT SERPL-CCNC: 9 U/L (ref 0–40)
ANION GAP SERPL CALCULATED.3IONS-SCNC: 15 MMOL/L (ref 7–16)
AST SERPL-CCNC: 15 U/L (ref 0–39)
BASOPHILS ABSOLUTE: 0.05 E9/L (ref 0–0.2)
BASOPHILS RELATIVE PERCENT: 0.5 % (ref 0–2)
BILIRUB SERPL-MCNC: 0.3 MG/DL (ref 0–1.2)
BUN BLDV-MCNC: 7 MG/DL (ref 6–20)
CALCIUM SERPL-MCNC: 9.7 MG/DL (ref 8.6–10.2)
CHLORIDE BLD-SCNC: 101 MMOL/L (ref 98–107)
CHOLESTEROL, TOTAL: 161 MG/DL (ref 0–199)
CO2: 25 MMOL/L (ref 22–29)
CREAT SERPL-MCNC: 1 MG/DL (ref 0.7–1.2)
EOSINOPHILS ABSOLUTE: 0.25 E9/L (ref 0.05–0.5)
EOSINOPHILS RELATIVE PERCENT: 2.3 % (ref 0–6)
GFR AFRICAN AMERICAN: >60
GFR NON-AFRICAN AMERICAN: >60 ML/MIN/1.73
GLUCOSE BLD-MCNC: 109 MG/DL (ref 74–99)
HBA1C MFR BLD: 5.7 % (ref 4–5.6)
HCT VFR BLD CALC: 47.1 % (ref 37–54)
HDLC SERPL-MCNC: 35 MG/DL
HEMOGLOBIN: 15.1 G/DL (ref 12.5–16.5)
IMMATURE GRANULOCYTES #: 0.06 E9/L
IMMATURE GRANULOCYTES %: 0.5 % (ref 0–5)
LDL CHOLESTEROL CALCULATED: 78 MG/DL (ref 0–99)
LYMPHOCYTES ABSOLUTE: 2.78 E9/L (ref 1.5–4)
LYMPHOCYTES RELATIVE PERCENT: 25.2 % (ref 20–42)
MCH RBC QN AUTO: 31.1 PG (ref 26–35)
MCHC RBC AUTO-ENTMCNC: 32.1 % (ref 32–34.5)
MCV RBC AUTO: 96.9 FL (ref 80–99.9)
MONOCYTES ABSOLUTE: 1.11 E9/L (ref 0.1–0.95)
MONOCYTES RELATIVE PERCENT: 10.1 % (ref 2–12)
NEUTROPHILS ABSOLUTE: 6.79 E9/L (ref 1.8–7.3)
NEUTROPHILS RELATIVE PERCENT: 61.4 % (ref 43–80)
PDW BLD-RTO: 14.8 FL (ref 11.5–15)
PLATELET # BLD: 338 E9/L (ref 130–450)
PMV BLD AUTO: 9.7 FL (ref 7–12)
POTASSIUM SERPL-SCNC: 5.1 MMOL/L (ref 3.5–5)
RBC # BLD: 4.86 E12/L (ref 3.8–5.8)
SODIUM BLD-SCNC: 141 MMOL/L (ref 132–146)
TOTAL PROTEIN: 7.7 G/DL (ref 6.4–8.3)
TRIGL SERPL-MCNC: 242 MG/DL (ref 0–149)
VLDLC SERPL CALC-MCNC: 48 MG/DL
WBC # BLD: 11 E9/L (ref 4.5–11.5)

## 2019-07-18 PROCEDURE — 85025 COMPLETE CBC W/AUTO DIFF WBC: CPT

## 2019-07-18 PROCEDURE — 80061 LIPID PANEL: CPT

## 2019-07-18 PROCEDURE — 83036 HEMOGLOBIN GLYCOSYLATED A1C: CPT

## 2019-07-18 PROCEDURE — 80053 COMPREHEN METABOLIC PANEL: CPT

## 2019-07-18 PROCEDURE — 36415 COLL VENOUS BLD VENIPUNCTURE: CPT

## 2019-11-06 DIAGNOSIS — R05.8 PRODUCTIVE COUGH: ICD-10-CM

## 2019-11-21 ENCOUNTER — TELEPHONE (OUTPATIENT)
Dept: FAMILY MEDICINE CLINIC | Age: 54
End: 2019-11-21

## 2019-11-21 DIAGNOSIS — K21.9 GASTROESOPHAGEAL REFLUX DISEASE, ESOPHAGITIS PRESENCE NOT SPECIFIED: ICD-10-CM

## 2019-11-21 DIAGNOSIS — E78.5 DYSLIPIDEMIA: Primary | ICD-10-CM

## 2019-11-21 DIAGNOSIS — R73.03 PREDIABETES: ICD-10-CM

## 2019-11-23 DIAGNOSIS — J44.1 COPD EXACERBATION (HCC): ICD-10-CM

## 2019-11-26 RX ORDER — FLUTICASONE FUROATE, UMECLIDINIUM BROMIDE AND VILANTEROL TRIFENATATE 100; 62.5; 25 UG/1; UG/1; UG/1
POWDER RESPIRATORY (INHALATION)
Qty: 1 EACH | Refills: 5 | Status: SHIPPED | OUTPATIENT
Start: 2019-11-26 | End: 2020-01-22 | Stop reason: SDUPTHER

## 2019-12-02 ENCOUNTER — HOSPITAL ENCOUNTER (OUTPATIENT)
Age: 54
Discharge: HOME | End: 2019-12-04
Payer: MEDICARE

## 2019-12-02 DIAGNOSIS — E78.5 DYSLIPIDEMIA: ICD-10-CM

## 2019-12-02 DIAGNOSIS — K21.9 GASTROESOPHAGEAL REFLUX DISEASE, ESOPHAGITIS PRESENCE NOT SPECIFIED: ICD-10-CM

## 2019-12-02 DIAGNOSIS — R73.03 PREDIABETES: ICD-10-CM

## 2019-12-02 LAB
ALBUMIN SERPL-MCNC: 4.7 G/DL (ref 3.5–5.2)
ALP BLD-CCNC: 78 U/L (ref 40–129)
ALT SERPL-CCNC: 13 U/L (ref 0–40)
ANION GAP SERPL CALCULATED.3IONS-SCNC: 14 MMOL/L (ref 7–16)
AST SERPL-CCNC: 22 U/L (ref 0–39)
BASOPHILS ABSOLUTE: 0.07 E9/L (ref 0–0.2)
BASOPHILS RELATIVE PERCENT: 0.6 % (ref 0–2)
BILIRUB SERPL-MCNC: 0.2 MG/DL (ref 0–1.2)
BUN BLDV-MCNC: 7 MG/DL (ref 6–20)
CALCIUM SERPL-MCNC: 9.4 MG/DL (ref 8.6–10.2)
CHLORIDE BLD-SCNC: 102 MMOL/L (ref 98–107)
CHOLESTEROL, TOTAL: 137 MG/DL (ref 0–199)
CO2: 24 MMOL/L (ref 22–29)
CREAT SERPL-MCNC: 1 MG/DL (ref 0.7–1.2)
EOSINOPHILS ABSOLUTE: 0.26 E9/L (ref 0.05–0.5)
EOSINOPHILS RELATIVE PERCENT: 2.1 % (ref 0–6)
GFR AFRICAN AMERICAN: >60
GFR NON-AFRICAN AMERICAN: >60 ML/MIN/1.73
GLUCOSE BLD-MCNC: 109 MG/DL (ref 74–99)
HBA1C MFR BLD: 6 % (ref 4–5.6)
HCT VFR BLD CALC: 48.6 % (ref 37–54)
HDLC SERPL-MCNC: 31 MG/DL
HEMOGLOBIN: 15.7 G/DL (ref 12.5–16.5)
IMMATURE GRANULOCYTES #: 0.05 E9/L
IMMATURE GRANULOCYTES %: 0.4 % (ref 0–5)
LDL CHOLESTEROL CALCULATED: 55 MG/DL (ref 0–99)
LYMPHOCYTES ABSOLUTE: 3.41 E9/L (ref 1.5–4)
LYMPHOCYTES RELATIVE PERCENT: 27 % (ref 20–42)
MCH RBC QN AUTO: 31.2 PG (ref 26–35)
MCHC RBC AUTO-ENTMCNC: 32.3 % (ref 32–34.5)
MCV RBC AUTO: 96.4 FL (ref 80–99.9)
MONOCYTES ABSOLUTE: 1.09 E9/L (ref 0.1–0.95)
MONOCYTES RELATIVE PERCENT: 8.6 % (ref 2–12)
NEUTROPHILS ABSOLUTE: 7.76 E9/L (ref 1.8–7.3)
NEUTROPHILS RELATIVE PERCENT: 61.3 % (ref 43–80)
PDW BLD-RTO: 14.6 FL (ref 11.5–15)
PLATELET # BLD: 360 E9/L (ref 130–450)
PMV BLD AUTO: 9.8 FL (ref 7–12)
POTASSIUM SERPL-SCNC: 4.7 MMOL/L (ref 3.5–5)
RBC # BLD: 5.04 E12/L (ref 3.8–5.8)
SODIUM BLD-SCNC: 140 MMOL/L (ref 132–146)
TOTAL PROTEIN: 7.8 G/DL (ref 6.4–8.3)
TRIGL SERPL-MCNC: 254 MG/DL (ref 0–149)
VLDLC SERPL CALC-MCNC: 51 MG/DL
WBC # BLD: 12.6 E9/L (ref 4.5–11.5)

## 2019-12-02 PROCEDURE — 80061 LIPID PANEL: CPT

## 2019-12-02 PROCEDURE — 85025 COMPLETE CBC W/AUTO DIFF WBC: CPT

## 2019-12-02 PROCEDURE — 83036 HEMOGLOBIN GLYCOSYLATED A1C: CPT

## 2019-12-02 PROCEDURE — 80053 COMPREHEN METABOLIC PANEL: CPT

## 2019-12-02 PROCEDURE — 36415 COLL VENOUS BLD VENIPUNCTURE: CPT

## 2019-12-11 ENCOUNTER — OFFICE VISIT (OUTPATIENT)
Dept: FAMILY MEDICINE CLINIC | Age: 54
End: 2019-12-11
Payer: MEDICARE

## 2019-12-11 VITALS
TEMPERATURE: 97.3 F | HEART RATE: 94 BPM | WEIGHT: 232.5 LBS | DIASTOLIC BLOOD PRESSURE: 72 MMHG | HEIGHT: 69 IN | SYSTOLIC BLOOD PRESSURE: 108 MMHG | OXYGEN SATURATION: 95 % | BODY MASS INDEX: 34.44 KG/M2

## 2019-12-11 DIAGNOSIS — E78.5 DYSLIPIDEMIA: Primary | ICD-10-CM

## 2019-12-11 DIAGNOSIS — R73.03 PREDIABETES: ICD-10-CM

## 2019-12-11 DIAGNOSIS — Z76.0 MEDICATION REFILL: ICD-10-CM

## 2019-12-11 DIAGNOSIS — J44.9 CHRONIC OBSTRUCTIVE PULMONARY DISEASE, UNSPECIFIED COPD TYPE (HCC): ICD-10-CM

## 2019-12-11 DIAGNOSIS — K21.9 GASTROESOPHAGEAL REFLUX DISEASE, ESOPHAGITIS PRESENCE NOT SPECIFIED: ICD-10-CM

## 2019-12-11 PROCEDURE — G8482 FLU IMMUNIZE ORDER/ADMIN: HCPCS | Performed by: FAMILY MEDICINE

## 2019-12-11 PROCEDURE — 3017F COLORECTAL CA SCREEN DOC REV: CPT | Performed by: FAMILY MEDICINE

## 2019-12-11 PROCEDURE — G8926 SPIRO NO PERF OR DOC: HCPCS | Performed by: FAMILY MEDICINE

## 2019-12-11 PROCEDURE — 1036F TOBACCO NON-USER: CPT | Performed by: FAMILY MEDICINE

## 2019-12-11 PROCEDURE — G8427 DOCREV CUR MEDS BY ELIG CLIN: HCPCS | Performed by: FAMILY MEDICINE

## 2019-12-11 PROCEDURE — G8417 CALC BMI ABV UP PARAM F/U: HCPCS | Performed by: FAMILY MEDICINE

## 2019-12-11 PROCEDURE — 3023F SPIROM DOC REV: CPT | Performed by: FAMILY MEDICINE

## 2019-12-11 PROCEDURE — 99214 OFFICE O/P EST MOD 30 MIN: CPT | Performed by: FAMILY MEDICINE

## 2019-12-11 RX ORDER — DIAZEPAM 5 MG/1
TABLET ORAL
Refills: 2 | COMMUNITY
Start: 2019-11-14 | End: 2021-10-14 | Stop reason: CLARIF

## 2019-12-11 RX ORDER — SENNA AND DOCUSATE SODIUM 50; 8.6 MG/1; MG/1
TABLET, FILM COATED ORAL
Refills: 6 | COMMUNITY
Start: 2019-11-14 | End: 2021-04-14

## 2019-12-11 RX ORDER — VENLAFAXINE HYDROCHLORIDE 150 MG/1
CAPSULE, EXTENDED RELEASE ORAL
Refills: 5 | COMMUNITY
Start: 2019-11-11 | End: 2022-10-20

## 2019-12-11 RX ORDER — PAROXETINE 30 MG/1
TABLET, FILM COATED ORAL
Refills: 0 | COMMUNITY
Start: 2019-11-22 | End: 2021-10-14 | Stop reason: CLARIF

## 2019-12-11 RX ORDER — ATORVASTATIN CALCIUM 20 MG/1
TABLET, FILM COATED ORAL
Qty: 30 TABLET | Refills: 5 | Status: SHIPPED
Start: 2019-12-11 | End: 2020-05-19

## 2019-12-11 ASSESSMENT — PATIENT HEALTH QUESTIONNAIRE - PHQ9
1. LITTLE INTEREST OR PLEASURE IN DOING THINGS: 0
SUM OF ALL RESPONSES TO PHQ9 QUESTIONS 1 & 2: 0
SUM OF ALL RESPONSES TO PHQ QUESTIONS 1-9: 0
2. FEELING DOWN, DEPRESSED OR HOPELESS: 0
SUM OF ALL RESPONSES TO PHQ QUESTIONS 1-9: 0

## 2019-12-11 ASSESSMENT — ENCOUNTER SYMPTOMS
ABDOMINAL PAIN: 0
BLOOD IN STOOL: 0
SHORTNESS OF BREATH: 0
COUGH: 0

## 2020-05-19 RX ORDER — ATORVASTATIN CALCIUM 20 MG/1
TABLET, FILM COATED ORAL
Qty: 30 TABLET | Refills: 0 | Status: SHIPPED
Start: 2020-05-19 | End: 2020-07-29 | Stop reason: SDUPTHER

## 2020-06-02 ENCOUNTER — HOSPITAL ENCOUNTER (OUTPATIENT)
Age: 55
Discharge: HOME | End: 2020-06-04
Payer: MEDICARE

## 2020-06-02 LAB
ALBUMIN SERPL-MCNC: 4.9 G/DL (ref 3.5–5.2)
ALP BLD-CCNC: 74 U/L (ref 40–129)
ALT SERPL-CCNC: 19 U/L (ref 0–40)
ANION GAP SERPL CALCULATED.3IONS-SCNC: 18 MMOL/L (ref 7–16)
AST SERPL-CCNC: 21 U/L (ref 0–39)
BASOPHILS ABSOLUTE: 0.06 E9/L (ref 0–0.2)
BASOPHILS RELATIVE PERCENT: 0.4 % (ref 0–2)
BILIRUB SERPL-MCNC: 0.5 MG/DL (ref 0–1.2)
BUN BLDV-MCNC: 9 MG/DL (ref 6–20)
CALCIUM SERPL-MCNC: 9.9 MG/DL (ref 8.6–10.2)
CHLORIDE BLD-SCNC: 99 MMOL/L (ref 98–107)
CHOLESTEROL, TOTAL: 132 MG/DL (ref 0–199)
CO2: 21 MMOL/L (ref 22–29)
CREAT SERPL-MCNC: 1 MG/DL (ref 0.7–1.2)
EOSINOPHILS ABSOLUTE: 0.18 E9/L (ref 0.05–0.5)
EOSINOPHILS RELATIVE PERCENT: 1.3 % (ref 0–6)
GFR AFRICAN AMERICAN: >60
GFR NON-AFRICAN AMERICAN: >60 ML/MIN/1.73
GLUCOSE BLD-MCNC: 113 MG/DL (ref 74–99)
HBA1C MFR BLD: 5.8 % (ref 4–5.6)
HCT VFR BLD CALC: 51.1 % (ref 37–54)
HDLC SERPL-MCNC: 31 MG/DL
HEMOGLOBIN: 16.6 G/DL (ref 12.5–16.5)
IMMATURE GRANULOCYTES #: 0.04 E9/L
IMMATURE GRANULOCYTES %: 0.3 % (ref 0–5)
LDL CHOLESTEROL CALCULATED: 63 MG/DL (ref 0–99)
LYMPHOCYTES ABSOLUTE: 3.3 E9/L (ref 1.5–4)
LYMPHOCYTES RELATIVE PERCENT: 24.4 % (ref 20–42)
MCH RBC QN AUTO: 31.6 PG (ref 26–35)
MCHC RBC AUTO-ENTMCNC: 32.5 % (ref 32–34.5)
MCV RBC AUTO: 97.3 FL (ref 80–99.9)
MONOCYTES ABSOLUTE: 1.48 E9/L (ref 0.1–0.95)
MONOCYTES RELATIVE PERCENT: 10.9 % (ref 2–12)
NEUTROPHILS ABSOLUTE: 8.46 E9/L (ref 1.8–7.3)
NEUTROPHILS RELATIVE PERCENT: 62.7 % (ref 43–80)
PDW BLD-RTO: 14.3 FL (ref 11.5–15)
PLATELET # BLD: 355 E9/L (ref 130–450)
PMV BLD AUTO: 10.7 FL (ref 7–12)
POTASSIUM SERPL-SCNC: 4.5 MMOL/L (ref 3.5–5)
RBC # BLD: 5.25 E12/L (ref 3.8–5.8)
SODIUM BLD-SCNC: 138 MMOL/L (ref 132–146)
TOTAL PROTEIN: 8.2 G/DL (ref 6.4–8.3)
TRIGL SERPL-MCNC: 192 MG/DL (ref 0–149)
VLDLC SERPL CALC-MCNC: 38 MG/DL
WBC # BLD: 13.5 E9/L (ref 4.5–11.5)

## 2020-06-02 PROCEDURE — 83036 HEMOGLOBIN GLYCOSYLATED A1C: CPT

## 2020-06-02 PROCEDURE — 85025 COMPLETE CBC W/AUTO DIFF WBC: CPT

## 2020-06-02 PROCEDURE — 36415 COLL VENOUS BLD VENIPUNCTURE: CPT

## 2020-06-02 PROCEDURE — 80053 COMPREHEN METABOLIC PANEL: CPT

## 2020-06-02 PROCEDURE — 80061 LIPID PANEL: CPT

## 2020-07-21 ENCOUNTER — HOSPITAL ENCOUNTER (OUTPATIENT)
Age: 55
Discharge: HOME | End: 2020-07-23
Payer: MEDICARE

## 2020-07-21 LAB
ALBUMIN SERPL-MCNC: 4.8 G/DL (ref 3.5–5.2)
ALP BLD-CCNC: 80 U/L (ref 40–129)
ALT SERPL-CCNC: 12 U/L (ref 0–40)
ANION GAP SERPL CALCULATED.3IONS-SCNC: 19 MMOL/L (ref 7–16)
AST SERPL-CCNC: 19 U/L (ref 0–39)
BASOPHILS ABSOLUTE: 0.06 E9/L (ref 0–0.2)
BASOPHILS RELATIVE PERCENT: 0.4 % (ref 0–2)
BILIRUB SERPL-MCNC: 0.5 MG/DL (ref 0–1.2)
BUN BLDV-MCNC: 10 MG/DL (ref 6–20)
CALCIUM SERPL-MCNC: 9.9 MG/DL (ref 8.6–10.2)
CHLORIDE BLD-SCNC: 102 MMOL/L (ref 98–107)
CO2: 19 MMOL/L (ref 22–29)
CREAT SERPL-MCNC: 1 MG/DL (ref 0.7–1.2)
EOSINOPHILS ABSOLUTE: 0.14 E9/L (ref 0.05–0.5)
EOSINOPHILS RELATIVE PERCENT: 1 % (ref 0–6)
GFR AFRICAN AMERICAN: >60
GFR NON-AFRICAN AMERICAN: >60 ML/MIN/1.73
GLUCOSE BLD-MCNC: 85 MG/DL (ref 74–99)
HCT VFR BLD CALC: 49 % (ref 37–54)
HEMOGLOBIN: 16.1 G/DL (ref 12.5–16.5)
IMMATURE GRANULOCYTES #: 0.06 E9/L
IMMATURE GRANULOCYTES %: 0.4 % (ref 0–5)
LYMPHOCYTES ABSOLUTE: 3.47 E9/L (ref 1.5–4)
LYMPHOCYTES RELATIVE PERCENT: 24.8 % (ref 20–42)
MCH RBC QN AUTO: 31.4 PG (ref 26–35)
MCHC RBC AUTO-ENTMCNC: 32.9 % (ref 32–34.5)
MCV RBC AUTO: 95.5 FL (ref 80–99.9)
MONOCYTES ABSOLUTE: 1.18 E9/L (ref 0.1–0.95)
MONOCYTES RELATIVE PERCENT: 8.4 % (ref 2–12)
NEUTROPHILS ABSOLUTE: 9.07 E9/L (ref 1.8–7.3)
NEUTROPHILS RELATIVE PERCENT: 65 % (ref 43–80)
PDW BLD-RTO: 13.9 FL (ref 11.5–15)
PLATELET # BLD: 306 E9/L (ref 130–450)
PMV BLD AUTO: 10.4 FL (ref 7–12)
POTASSIUM SERPL-SCNC: 4.4 MMOL/L (ref 3.5–5)
RBC # BLD: 5.13 E12/L (ref 3.8–5.8)
SODIUM BLD-SCNC: 140 MMOL/L (ref 132–146)
TOTAL PROTEIN: 7.9 G/DL (ref 6.4–8.3)
TSH SERPL DL<=0.05 MIU/L-ACNC: 2.16 UIU/ML (ref 0.27–4.2)
WBC # BLD: 14 E9/L (ref 4.5–11.5)

## 2020-07-21 PROCEDURE — 85025 COMPLETE CBC W/AUTO DIFF WBC: CPT

## 2020-07-21 PROCEDURE — 84443 ASSAY THYROID STIM HORMONE: CPT

## 2020-07-21 PROCEDURE — 80053 COMPREHEN METABOLIC PANEL: CPT

## 2020-07-21 PROCEDURE — 87522 HEPATITIS C REVRS TRNSCRPJ: CPT

## 2020-08-27 ENCOUNTER — HOSPITAL ENCOUNTER (OUTPATIENT)
Age: 55
Discharge: HOME | End: 2020-08-29
Payer: MEDICARE

## 2020-08-27 LAB
ALBUMIN SERPL-MCNC: 4.2 G/DL (ref 3.5–5.2)
ALP BLD-CCNC: 66 U/L (ref 40–129)
ALT SERPL-CCNC: 8 U/L (ref 0–40)
ANION GAP SERPL CALCULATED.3IONS-SCNC: 19 MMOL/L (ref 7–16)
AST SERPL-CCNC: 16 U/L (ref 0–39)
BASOPHILS ABSOLUTE: 0.06 E9/L (ref 0–0.2)
BASOPHILS RELATIVE PERCENT: 0.7 % (ref 0–2)
BILIRUB SERPL-MCNC: 0.3 MG/DL (ref 0–1.2)
BUN BLDV-MCNC: 5 MG/DL (ref 6–20)
CALCIUM SERPL-MCNC: 9.4 MG/DL (ref 8.6–10.2)
CHLORIDE BLD-SCNC: 107 MMOL/L (ref 98–107)
CHOLESTEROL, TOTAL: 118 MG/DL (ref 0–199)
CO2: 18 MMOL/L (ref 22–29)
CREAT SERPL-MCNC: 1.1 MG/DL (ref 0.7–1.2)
EOSINOPHILS ABSOLUTE: 0.31 E9/L (ref 0.05–0.5)
EOSINOPHILS RELATIVE PERCENT: 3.5 % (ref 0–6)
GFR AFRICAN AMERICAN: >60
GFR NON-AFRICAN AMERICAN: >60 ML/MIN/1.73
GLUCOSE BLD-MCNC: 87 MG/DL (ref 74–99)
HBA1C MFR BLD: 5.6 % (ref 4–5.6)
HCT VFR BLD CALC: 47.9 % (ref 37–54)
HDLC SERPL-MCNC: 31 MG/DL
HEMOGLOBIN: 15.4 G/DL (ref 12.5–16.5)
IMMATURE GRANULOCYTES #: 0.02 E9/L
IMMATURE GRANULOCYTES %: 0.2 % (ref 0–5)
LDL CHOLESTEROL CALCULATED: 45 MG/DL (ref 0–99)
LYMPHOCYTES ABSOLUTE: 3.42 E9/L (ref 1.5–4)
LYMPHOCYTES RELATIVE PERCENT: 39 % (ref 20–42)
MCH RBC QN AUTO: 30.8 PG (ref 26–35)
MCHC RBC AUTO-ENTMCNC: 32.2 % (ref 32–34.5)
MCV RBC AUTO: 95.8 FL (ref 80–99.9)
MONOCYTES ABSOLUTE: 0.94 E9/L (ref 0.1–0.95)
MONOCYTES RELATIVE PERCENT: 10.7 % (ref 2–12)
NEUTROPHILS ABSOLUTE: 4.03 E9/L (ref 1.8–7.3)
NEUTROPHILS RELATIVE PERCENT: 45.9 % (ref 43–80)
PDW BLD-RTO: 14.2 FL (ref 11.5–15)
PLATELET # BLD: 300 E9/L (ref 130–450)
PMV BLD AUTO: 10.3 FL (ref 7–12)
POTASSIUM SERPL-SCNC: 4.4 MMOL/L (ref 3.5–5)
PROSTATE SPECIFIC ANTIGEN: 0.32 NG/ML (ref 0–4)
RBC # BLD: 5 E12/L (ref 3.8–5.8)
SODIUM BLD-SCNC: 144 MMOL/L (ref 132–146)
TOTAL PROTEIN: 7 G/DL (ref 6.4–8.3)
TRIGL SERPL-MCNC: 212 MG/DL (ref 0–149)
VITAMIN D 25-HYDROXY: 53 NG/ML (ref 30–100)
VLDLC SERPL CALC-MCNC: 42 MG/DL
WBC # BLD: 8.8 E9/L (ref 4.5–11.5)

## 2020-08-27 PROCEDURE — 83036 HEMOGLOBIN GLYCOSYLATED A1C: CPT

## 2020-08-27 PROCEDURE — 82306 VITAMIN D 25 HYDROXY: CPT

## 2020-08-27 PROCEDURE — 80061 LIPID PANEL: CPT

## 2020-08-27 PROCEDURE — 85025 COMPLETE CBC W/AUTO DIFF WBC: CPT

## 2020-08-27 PROCEDURE — 80053 COMPREHEN METABOLIC PANEL: CPT

## 2020-08-27 PROCEDURE — G0103 PSA SCREENING: HCPCS

## 2020-09-02 ENCOUNTER — HOSPITAL ENCOUNTER (OUTPATIENT)
Age: 55
Discharge: HOME | End: 2020-09-04
Payer: MEDICARE

## 2020-09-02 LAB
BACTERIA: ABNORMAL /HPF
BILIRUBIN URINE: NEGATIVE
BLOOD, URINE: NORMAL
CLARITY: CLEAR
COLOR: YELLOW
CRYSTALS, UA: ABNORMAL /HPF
GLUCOSE URINE: NEGATIVE MG/DL
KETONES, URINE: NEGATIVE MG/DL
LEUKOCYTE ESTERASE, URINE: NEGATIVE
MUCUS: PRESENT /LPF
NITRITE, URINE: NEGATIVE
PH UA: 6 (ref 5–9)
PROTEIN UA: NEGATIVE MG/DL
RBC UA: ABNORMAL /HPF (ref 0–2)
SPECIFIC GRAVITY UA: 1.02 (ref 1–1.03)
UROBILINOGEN, URINE: 0.2 E.U./DL
WBC UA: ABNORMAL /HPF (ref 0–5)

## 2020-09-02 PROCEDURE — 81001 URINALYSIS AUTO W/SCOPE: CPT

## 2020-09-02 PROCEDURE — 87088 URINE BACTERIA CULTURE: CPT

## 2020-09-04 LAB — URINE CULTURE, ROUTINE: NORMAL

## 2020-09-09 ENCOUNTER — TELEPHONE (OUTPATIENT)
Dept: CASE MANAGEMENT | Age: 55
End: 2020-09-09

## 2020-09-09 NOTE — TELEPHONE ENCOUNTER
I called the patient and he confirmed his CT lung screening at [facility] on 9/10/2020 at 10:30 am.  I reminded the patient to arrive at 10:00 am, enter through the main entrance, and register. Patient confirmed.         Electronically signed by Josette Andrea on 9/9/20 at 11:30 AM EDT

## 2020-09-14 ENCOUNTER — TELEPHONE (OUTPATIENT)
Dept: CASE MANAGEMENT | Age: 55
End: 2020-09-14

## 2020-12-02 DIAGNOSIS — R73.03 PREDIABETES: ICD-10-CM

## 2020-12-02 DIAGNOSIS — E78.5 DYSLIPIDEMIA: ICD-10-CM

## 2020-12-02 DIAGNOSIS — E55.9 VITAMIN D DEFICIENCY: ICD-10-CM

## 2020-12-02 LAB
ALBUMIN SERPL-MCNC: 4.8 G/DL (ref 3.5–5.2)
ALP BLD-CCNC: 89 U/L (ref 40–129)
ALT SERPL-CCNC: 10 U/L (ref 0–40)
ANION GAP SERPL CALCULATED.3IONS-SCNC: 17 MMOL/L (ref 7–16)
AST SERPL-CCNC: 13 U/L (ref 0–39)
BASOPHILS ABSOLUTE: 0.03 E9/L (ref 0–0.2)
BASOPHILS RELATIVE PERCENT: 0.3 % (ref 0–2)
BILIRUB SERPL-MCNC: 0.5 MG/DL (ref 0–1.2)
BUN BLDV-MCNC: 11 MG/DL (ref 6–20)
CALCIUM SERPL-MCNC: 10.6 MG/DL (ref 8.6–10.2)
CHLORIDE BLD-SCNC: 101 MMOL/L (ref 98–107)
CHOLESTEROL, TOTAL: 137 MG/DL (ref 0–199)
CO2: 24 MMOL/L (ref 22–29)
CREAT SERPL-MCNC: 0.9 MG/DL (ref 0.7–1.2)
EOSINOPHILS ABSOLUTE: 0.64 E9/L (ref 0.05–0.5)
EOSINOPHILS RELATIVE PERCENT: 6.9 % (ref 0–6)
GFR AFRICAN AMERICAN: >60
GFR NON-AFRICAN AMERICAN: >60 ML/MIN/1.73
GLUCOSE BLD-MCNC: 113 MG/DL (ref 74–99)
HBA1C MFR BLD: 5.4 % (ref 4–5.6)
HCT VFR BLD CALC: 45.7 % (ref 37–54)
HDLC SERPL-MCNC: 29 MG/DL
HEMOGLOBIN: 14.7 G/DL (ref 12.5–16.5)
IMMATURE GRANULOCYTES #: 0.03 E9/L
IMMATURE GRANULOCYTES %: 0.3 % (ref 0–5)
LDL CHOLESTEROL CALCULATED: 77 MG/DL (ref 0–99)
LYMPHOCYTES ABSOLUTE: 2.09 E9/L (ref 1.5–4)
LYMPHOCYTES RELATIVE PERCENT: 22.6 % (ref 20–42)
MCH RBC QN AUTO: 30.1 PG (ref 26–35)
MCHC RBC AUTO-ENTMCNC: 32.2 % (ref 32–34.5)
MCV RBC AUTO: 93.6 FL (ref 80–99.9)
MONOCYTES ABSOLUTE: 1.04 E9/L (ref 0.1–0.95)
MONOCYTES RELATIVE PERCENT: 11.3 % (ref 2–12)
NEUTROPHILS ABSOLUTE: 5.41 E9/L (ref 1.8–7.3)
NEUTROPHILS RELATIVE PERCENT: 58.6 % (ref 43–80)
PDW BLD-RTO: 14.2 FL (ref 11.5–15)
PLATELET # BLD: 306 E9/L (ref 130–450)
PMV BLD AUTO: 10.5 FL (ref 7–12)
POTASSIUM SERPL-SCNC: 4.6 MMOL/L (ref 3.5–5)
RBC # BLD: 4.88 E12/L (ref 3.8–5.8)
SODIUM BLD-SCNC: 142 MMOL/L (ref 132–146)
TOTAL PROTEIN: 8 G/DL (ref 6.4–8.3)
TRIGL SERPL-MCNC: 157 MG/DL (ref 0–149)
VITAMIN D 25-HYDROXY: 55 NG/ML (ref 30–100)
VLDLC SERPL CALC-MCNC: 31 MG/DL
WBC # BLD: 9.2 E9/L (ref 4.5–11.5)

## 2021-01-14 PROBLEM — F17.219 CIGARETTE NICOTINE DEPENDENCE WITH NICOTINE-INDUCED DISORDER: Chronic | Status: ACTIVE | Noted: 2018-02-14

## 2021-01-14 PROBLEM — E78.5 DYSLIPIDEMIA: Chronic | Status: ACTIVE | Noted: 2018-02-14

## 2021-01-14 PROBLEM — J11.1 BRONCHITIS WITH INFLUENZA: Status: RESOLVED | Noted: 2018-03-02 | Resolved: 2021-01-14

## 2021-04-06 DIAGNOSIS — E83.52 HYPERCALCEMIA: ICD-10-CM

## 2021-04-06 DIAGNOSIS — E78.5 DYSLIPIDEMIA: ICD-10-CM

## 2021-04-06 LAB
ALBUMIN SERPL-MCNC: 4.2 G/DL (ref 3.5–5.2)
ALP BLD-CCNC: 84 U/L (ref 40–129)
ALT SERPL-CCNC: 5 U/L (ref 0–40)
ANION GAP SERPL CALCULATED.3IONS-SCNC: 11 MMOL/L (ref 7–16)
AST SERPL-CCNC: 9 U/L (ref 0–39)
BASOPHILS ABSOLUTE: 0.07 E9/L (ref 0–0.2)
BASOPHILS RELATIVE PERCENT: 0.6 % (ref 0–2)
BILIRUB SERPL-MCNC: 0.2 MG/DL (ref 0–1.2)
BUN BLDV-MCNC: 10 MG/DL (ref 6–20)
CALCIUM SERPL-MCNC: 9.5 MG/DL (ref 8.6–10.2)
CHLORIDE BLD-SCNC: 101 MMOL/L (ref 98–107)
CHOLESTEROL, TOTAL: 136 MG/DL (ref 0–199)
CO2: 28 MMOL/L (ref 22–29)
CREAT SERPL-MCNC: 0.8 MG/DL (ref 0.7–1.2)
EOSINOPHILS ABSOLUTE: 0.34 E9/L (ref 0.05–0.5)
EOSINOPHILS RELATIVE PERCENT: 3 % (ref 0–6)
GFR AFRICAN AMERICAN: >60
GFR NON-AFRICAN AMERICAN: >60 ML/MIN/1.73
GLUCOSE BLD-MCNC: 94 MG/DL (ref 74–99)
HBA1C MFR BLD: 5.6 % (ref 4–5.6)
HCT VFR BLD CALC: 46.6 % (ref 37–54)
HDLC SERPL-MCNC: 30 MG/DL
HEMOGLOBIN: 15 G/DL (ref 12.5–16.5)
IMMATURE GRANULOCYTES #: 0.05 E9/L
IMMATURE GRANULOCYTES %: 0.4 % (ref 0–5)
LDL CHOLESTEROL CALCULATED: 78 MG/DL (ref 0–99)
LYMPHOCYTES ABSOLUTE: 2.83 E9/L (ref 1.5–4)
LYMPHOCYTES RELATIVE PERCENT: 24.9 % (ref 20–42)
MCH RBC QN AUTO: 31.6 PG (ref 26–35)
MCHC RBC AUTO-ENTMCNC: 32.2 % (ref 32–34.5)
MCV RBC AUTO: 98.3 FL (ref 80–99.9)
MONOCYTES ABSOLUTE: 0.97 E9/L (ref 0.1–0.95)
MONOCYTES RELATIVE PERCENT: 8.5 % (ref 2–12)
NEUTROPHILS ABSOLUTE: 7.09 E9/L (ref 1.8–7.3)
NEUTROPHILS RELATIVE PERCENT: 62.6 % (ref 43–80)
PDW BLD-RTO: 14.6 FL (ref 11.5–15)
PLATELET # BLD: 420 E9/L (ref 130–450)
PMV BLD AUTO: 9.2 FL (ref 7–12)
POTASSIUM SERPL-SCNC: 5.2 MMOL/L (ref 3.5–5)
RBC # BLD: 4.74 E12/L (ref 3.8–5.8)
SODIUM BLD-SCNC: 140 MMOL/L (ref 132–146)
TOTAL PROTEIN: 7.7 G/DL (ref 6.4–8.3)
TRIGL SERPL-MCNC: 140 MG/DL (ref 0–149)
VITAMIN D 25-HYDROXY: 58 NG/ML (ref 30–100)
VLDLC SERPL CALC-MCNC: 28 MG/DL
WBC # BLD: 11.4 E9/L (ref 4.5–11.5)

## 2021-04-07 LAB
PARATHYROID HORMONE INTACT: 29 PG/ML (ref 15–65)
SARS-COV-2 ANTIBODY, TOTAL: REACTIVE

## 2021-04-14 PROBLEM — R07.89 LEFT-SIDED CHEST WALL PAIN: Status: RESOLVED | Noted: 2018-03-02 | Resolved: 2021-04-14

## 2021-07-15 DIAGNOSIS — R19.7 DIARRHEA, UNSPECIFIED TYPE: ICD-10-CM

## 2021-09-20 ENCOUNTER — TELEPHONE (OUTPATIENT)
Dept: CASE MANAGEMENT | Age: 56
End: 2021-09-20

## 2021-09-20 NOTE — TELEPHONE ENCOUNTER
I called the patient and he confirmed his CT lung screening at [facility] on 9/21/2021 at 12:00 pm.  I reminded the patient to arrive at 11:30 am, enter through the main entrance, and register. Patient confirmed.           Electronically signed by Eddie Renae on 9/20/21 at 3:37 PM EDT

## 2021-10-04 ENCOUNTER — HOSPITAL ENCOUNTER (OUTPATIENT)
Age: 56
Discharge: HOME | End: 2021-10-04
Payer: MEDICARE

## 2021-10-04 DIAGNOSIS — E78.5 DYSLIPIDEMIA: ICD-10-CM

## 2021-10-04 DIAGNOSIS — K21.9 GASTROESOPHAGEAL REFLUX DISEASE WITHOUT ESOPHAGITIS: ICD-10-CM

## 2021-10-04 DIAGNOSIS — Z12.5 PROSTATE CANCER SCREENING: ICD-10-CM

## 2021-10-04 DIAGNOSIS — E55.9 VITAMIN D DEFICIENCY: ICD-10-CM

## 2021-10-04 DIAGNOSIS — K21.9 GASTROESOPHAGEAL REFLUX DISEASE, UNSPECIFIED WHETHER ESOPHAGITIS PRESENT: ICD-10-CM

## 2021-10-04 DIAGNOSIS — J44.9 CHRONIC OBSTRUCTIVE PULMONARY DISEASE, UNSPECIFIED COPD TYPE (HCC): ICD-10-CM

## 2021-10-04 LAB
ALBUMIN SERPL-MCNC: 4.7 G/DL (ref 3.5–5.2)
ALP BLD-CCNC: 80 U/L (ref 40–129)
ALT SERPL-CCNC: 11 U/L (ref 0–40)
ANION GAP SERPL CALCULATED.3IONS-SCNC: 7 MMOL/L (ref 7–16)
AST SERPL-CCNC: 12 U/L (ref 0–39)
BASOPHILS ABSOLUTE: 0.04 E9/L (ref 0–0.2)
BASOPHILS RELATIVE PERCENT: 0.4 % (ref 0–2)
BILIRUB SERPL-MCNC: 0.2 MG/DL (ref 0–1.2)
BUN BLDV-MCNC: 12 MG/DL (ref 6–20)
CALCIUM SERPL-MCNC: 9.7 MG/DL (ref 8.6–10.2)
CHLORIDE BLD-SCNC: 100 MMOL/L (ref 98–107)
CHOLESTEROL, TOTAL: 135 MG/DL (ref 0–199)
CO2: 31 MMOL/L (ref 22–29)
CREAT SERPL-MCNC: 1 MG/DL (ref 0.7–1.2)
EOSINOPHILS ABSOLUTE: 0.09 E9/L (ref 0.05–0.5)
EOSINOPHILS RELATIVE PERCENT: 0.9 % (ref 0–6)
GFR AFRICAN AMERICAN: >60
GFR NON-AFRICAN AMERICAN: >60 ML/MIN/1.73
GLUCOSE BLD-MCNC: 97 MG/DL (ref 74–99)
HCT VFR BLD CALC: 45.4 % (ref 37–54)
HDLC SERPL-MCNC: 43 MG/DL
HEMOGLOBIN: 15.2 G/DL (ref 12.5–16.5)
IMMATURE GRANULOCYTES #: 0.03 E9/L
IMMATURE GRANULOCYTES %: 0.3 % (ref 0–5)
LDL CHOLESTEROL CALCULATED: 58 MG/DL (ref 0–99)
LYMPHOCYTES ABSOLUTE: 2.88 E9/L (ref 1.5–4)
LYMPHOCYTES RELATIVE PERCENT: 29.4 % (ref 20–42)
MCH RBC QN AUTO: 31 PG (ref 26–35)
MCHC RBC AUTO-ENTMCNC: 33.5 % (ref 32–34.5)
MCV RBC AUTO: 92.7 FL (ref 80–99.9)
MONOCYTES ABSOLUTE: 0.8 E9/L (ref 0.1–0.95)
MONOCYTES RELATIVE PERCENT: 8.2 % (ref 2–12)
NEUTROPHILS ABSOLUTE: 5.96 E9/L (ref 1.8–7.3)
NEUTROPHILS RELATIVE PERCENT: 60.8 % (ref 43–80)
PDW BLD-RTO: 13.7 FL (ref 11.5–15)
PLATELET # BLD: 328 E9/L (ref 130–450)
PMV BLD AUTO: 9 FL (ref 7–12)
POTASSIUM SERPL-SCNC: 5.1 MMOL/L (ref 3.5–5)
PROSTATE SPECIFIC ANTIGEN: 0.7 NG/ML (ref 0–4)
RBC # BLD: 4.9 E12/L (ref 3.8–5.8)
SODIUM BLD-SCNC: 138 MMOL/L (ref 132–146)
TOTAL PROTEIN: 7.5 G/DL (ref 6.4–8.3)
TRIGL SERPL-MCNC: 172 MG/DL (ref 0–149)
VITAMIN D 25-HYDROXY: 65 NG/ML (ref 30–100)
VLDLC SERPL CALC-MCNC: 34 MG/DL
WBC # BLD: 9.8 E9/L (ref 4.5–11.5)

## 2021-10-04 PROCEDURE — G0103 PSA SCREENING: HCPCS

## 2021-10-04 PROCEDURE — 80061 LIPID PANEL: CPT

## 2021-10-04 PROCEDURE — 80053 COMPREHEN METABOLIC PANEL: CPT

## 2021-10-04 PROCEDURE — 36415 COLL VENOUS BLD VENIPUNCTURE: CPT

## 2021-10-04 PROCEDURE — 85025 COMPLETE CBC W/AUTO DIFF WBC: CPT

## 2021-10-04 PROCEDURE — 82306 VITAMIN D 25 HYDROXY: CPT

## 2021-10-19 ENCOUNTER — TELEPHONE (OUTPATIENT)
Dept: CASE MANAGEMENT | Age: 56
End: 2021-10-19

## 2021-10-19 NOTE — TELEPHONE ENCOUNTER
I called the patient and he confirmed his CT lung screening at [facility] on 10/20/2021 at 10:00 am.  I reminded the patient to arrive at 9:30 am, enter through the main entrance, and register. Patient confirmed.           Electronically signed by Hesham Brown on 10/19/21 at 2:59 PM EDT

## 2021-10-20 ENCOUNTER — HOSPITAL ENCOUNTER (OUTPATIENT)
Dept: CT IMAGING | Age: 56
Discharge: HOME | End: 2021-10-20
Payer: MEDICARE

## 2021-10-20 DIAGNOSIS — Z87.891 PERSONAL HISTORY OF TOBACCO USE: ICD-10-CM

## 2021-10-20 PROCEDURE — 71271 CT THORAX LUNG CANCER SCR C-: CPT

## 2021-10-21 ENCOUNTER — TELEPHONE (OUTPATIENT)
Dept: CASE MANAGEMENT | Age: 56
End: 2021-10-21

## 2021-10-21 NOTE — TELEPHONE ENCOUNTER
No call, encounter opened to process CT Lung Screening. CT Lung Screen: 10/20/2021    Impression   Stable bilateral 2-3 mm pulmonary nodules.       Incidental findings as above.       LUNG RADS:   Per ACR Lung-RADS Version 1.1       Category 2, Benign appearance or behavior.       Management:  Continue annual lung screening with LDCT in 12 months.       RECOMMENDATIONS:   If you would like to register your patient with the [facility], please contact the Nurse Navigator at   [phone].           Pack years: 43    Social History     Tobacco Use  Smoking Status: Current Every Day Smoker    Start Date:    Quit Date:    Types: Cigarettes   Packs/Day: 1   Years: 43   Pack Years: 43   Smokeless Tobacco: Never Used         Results letter sent to patient via my chart or mailed.      [address]

## 2022-04-11 DIAGNOSIS — E55.9 VITAMIN D DEFICIENCY: ICD-10-CM

## 2022-04-11 DIAGNOSIS — J44.9 CHRONIC OBSTRUCTIVE PULMONARY DISEASE, UNSPECIFIED COPD TYPE (HCC): ICD-10-CM

## 2022-04-11 DIAGNOSIS — E78.5 DYSLIPIDEMIA: Chronic | ICD-10-CM

## 2022-04-11 DIAGNOSIS — F17.219 CIGARETTE NICOTINE DEPENDENCE WITH NICOTINE-INDUCED DISORDER: Chronic | ICD-10-CM

## 2022-04-11 DIAGNOSIS — K21.9 GASTROESOPHAGEAL REFLUX DISEASE WITHOUT ESOPHAGITIS: ICD-10-CM

## 2022-04-11 LAB
BASOPHILS ABSOLUTE: 0.05 E9/L (ref 0–0.2)
BASOPHILS RELATIVE PERCENT: 0.7 % (ref 0–2)
EOSINOPHILS ABSOLUTE: 0.29 E9/L (ref 0.05–0.5)
EOSINOPHILS RELATIVE PERCENT: 3.8 % (ref 0–6)
HCT VFR BLD CALC: 44.6 % (ref 37–54)
HEMOGLOBIN: 14.4 G/DL (ref 12.5–16.5)
IMMATURE GRANULOCYTES #: 0.02 E9/L
IMMATURE GRANULOCYTES %: 0.3 % (ref 0–5)
LYMPHOCYTES ABSOLUTE: 2.38 E9/L (ref 1.5–4)
LYMPHOCYTES RELATIVE PERCENT: 31.4 % (ref 20–42)
MCH RBC QN AUTO: 31 PG (ref 26–35)
MCHC RBC AUTO-ENTMCNC: 32.3 % (ref 32–34.5)
MCV RBC AUTO: 96.1 FL (ref 80–99.9)
MONOCYTES ABSOLUTE: 0.72 E9/L (ref 0.1–0.95)
MONOCYTES RELATIVE PERCENT: 9.5 % (ref 2–12)
NEUTROPHILS ABSOLUTE: 4.13 E9/L (ref 1.8–7.3)
NEUTROPHILS RELATIVE PERCENT: 54.3 % (ref 43–80)
PDW BLD-RTO: 13.8 FL (ref 11.5–15)
PLATELET # BLD: 324 E9/L (ref 130–450)
PMV BLD AUTO: 9.6 FL (ref 7–12)
RBC # BLD: 4.64 E12/L (ref 3.8–5.8)
WBC # BLD: 7.6 E9/L (ref 4.5–11.5)

## 2022-04-12 LAB
ALBUMIN SERPL-MCNC: 4.2 G/DL (ref 3.5–5.2)
ALP BLD-CCNC: 70 U/L (ref 40–129)
ALT SERPL-CCNC: 8 U/L (ref 0–40)
ANION GAP SERPL CALCULATED.3IONS-SCNC: 15 MMOL/L (ref 7–16)
AST SERPL-CCNC: 10 U/L (ref 0–39)
BILIRUB SERPL-MCNC: 0.2 MG/DL (ref 0–1.2)
BUN BLDV-MCNC: 9 MG/DL (ref 6–20)
CALCIUM SERPL-MCNC: 9.3 MG/DL (ref 8.6–10.2)
CHLORIDE BLD-SCNC: 105 MMOL/L (ref 98–107)
CHOLESTEROL, TOTAL: 115 MG/DL (ref 0–199)
CO2: 23 MMOL/L (ref 22–29)
CREAT SERPL-MCNC: 1.2 MG/DL (ref 0.7–1.2)
GFR AFRICAN AMERICAN: >60
GFR NON-AFRICAN AMERICAN: >60 ML/MIN/1.73
GLUCOSE BLD-MCNC: 101 MG/DL (ref 74–99)
HDLC SERPL-MCNC: 30 MG/DL
LDL CHOLESTEROL CALCULATED: 51 MG/DL (ref 0–99)
POTASSIUM SERPL-SCNC: 4.3 MMOL/L (ref 3.5–5)
SODIUM BLD-SCNC: 143 MMOL/L (ref 132–146)
TOTAL PROTEIN: 6.7 G/DL (ref 6.4–8.3)
TRIGL SERPL-MCNC: 171 MG/DL (ref 0–149)
VITAMIN D 25-HYDROXY: 47 NG/ML (ref 30–100)
VLDLC SERPL CALC-MCNC: 34 MG/DL

## 2022-10-20 PROBLEM — R73.03 PREDIABETES: Status: ACTIVE | Noted: 2022-10-20

## 2022-11-04 ENCOUNTER — HOSPITAL ENCOUNTER (INPATIENT)
Age: 57
LOS: 4 days | Discharge: HOME | DRG: 871 | End: 2022-11-09
Attending: EMERGENCY MEDICINE | Admitting: INTERNAL MEDICINE
Payer: MEDICARE

## 2022-11-04 ENCOUNTER — APPOINTMENT (OUTPATIENT)
Dept: CT IMAGING | Age: 57
DRG: 871 | End: 2022-11-04
Payer: MEDICARE

## 2022-11-04 ENCOUNTER — APPOINTMENT (OUTPATIENT)
Dept: GENERAL RADIOLOGY | Age: 57
DRG: 871 | End: 2022-11-04
Payer: MEDICARE

## 2022-11-04 DIAGNOSIS — J85.1 ABSCESS OF LOWER LOBE OF LEFT LUNG WITH PNEUMONIA (HCC): Primary | ICD-10-CM

## 2022-11-04 LAB
ALBUMIN SERPL-MCNC: 3.4 G/DL (ref 3.5–5.2)
ALP BLD-CCNC: 85 U/L (ref 40–129)
ALT SERPL-CCNC: 52 U/L (ref 0–40)
ANION GAP SERPL CALCULATED.3IONS-SCNC: 15 MMOL/L (ref 7–16)
AST SERPL-CCNC: 52 U/L (ref 0–39)
BASOPHILS ABSOLUTE: 0.04 E9/L (ref 0–0.2)
BASOPHILS RELATIVE PERCENT: 0.2 % (ref 0–2)
BILIRUB SERPL-MCNC: 0.6 MG/DL (ref 0–1.2)
BUN BLDV-MCNC: 5 MG/DL (ref 6–20)
CALCIUM SERPL-MCNC: 9 MG/DL (ref 8.6–10.2)
CHLORIDE BLD-SCNC: 97 MMOL/L (ref 98–107)
CO2: 24 MMOL/L (ref 22–29)
CREAT SERPL-MCNC: 0.9 MG/DL (ref 0.7–1.2)
EOSINOPHILS ABSOLUTE: 0.03 E9/L (ref 0.05–0.5)
EOSINOPHILS RELATIVE PERCENT: 0.2 % (ref 0–6)
GFR SERPL CREATININE-BSD FRML MDRD: >60 ML/MIN/1.73
GLUCOSE BLD-MCNC: 96 MG/DL (ref 74–99)
HCT VFR BLD CALC: 38.3 % (ref 37–54)
HEMOGLOBIN: 12.7 G/DL (ref 12.5–16.5)
IMMATURE GRANULOCYTES #: 0.14 E9/L
IMMATURE GRANULOCYTES %: 0.9 % (ref 0–5)
INFLUENZA A BY PCR: NOT DETECTED
INFLUENZA B BY PCR: NOT DETECTED
LYMPHOCYTES ABSOLUTE: 1.92 E9/L (ref 1.5–4)
LYMPHOCYTES RELATIVE PERCENT: 11.9 % (ref 20–42)
MAGNESIUM: 1.8 MG/DL (ref 1.6–2.6)
MCH RBC QN AUTO: 29.8 PG (ref 26–35)
MCHC RBC AUTO-ENTMCNC: 33.2 % (ref 32–34.5)
MCV RBC AUTO: 89.9 FL (ref 80–99.9)
MONOCYTES ABSOLUTE: 1.18 E9/L (ref 0.1–0.95)
MONOCYTES RELATIVE PERCENT: 7.3 % (ref 2–12)
NEUTROPHILS ABSOLUTE: 12.79 E9/L (ref 1.8–7.3)
NEUTROPHILS RELATIVE PERCENT: 79.5 % (ref 43–80)
PDW BLD-RTO: 13.2 FL (ref 11.5–15)
PLATELET # BLD: 570 E9/L (ref 130–450)
PMV BLD AUTO: 8.2 FL (ref 7–12)
POTASSIUM SERPL-SCNC: 3.5 MMOL/L (ref 3.5–5)
RBC # BLD: 4.26 E12/L (ref 3.8–5.8)
SARS-COV-2, NAAT: NOT DETECTED
SODIUM BLD-SCNC: 136 MMOL/L (ref 132–146)
TOTAL CK: 32 U/L (ref 20–200)
TOTAL PROTEIN: 7.1 G/DL (ref 6.4–8.3)
TROPONIN, HIGH SENSITIVITY: 13 NG/L (ref 0–11)
WBC # BLD: 16.1 E9/L (ref 4.5–11.5)

## 2022-11-04 PROCEDURE — 87635 SARS-COV-2 COVID-19 AMP PRB: CPT

## 2022-11-04 PROCEDURE — 81001 URINALYSIS AUTO W/SCOPE: CPT

## 2022-11-04 PROCEDURE — 83735 ASSAY OF MAGNESIUM: CPT

## 2022-11-04 PROCEDURE — 80053 COMPREHEN METABOLIC PANEL: CPT

## 2022-11-04 PROCEDURE — 71046 X-RAY EXAM CHEST 2 VIEWS: CPT

## 2022-11-04 PROCEDURE — 85025 COMPLETE CBC W/AUTO DIFF WBC: CPT

## 2022-11-04 PROCEDURE — 96361 HYDRATE IV INFUSION ADD-ON: CPT

## 2022-11-04 PROCEDURE — 93005 ELECTROCARDIOGRAM TRACING: CPT | Performed by: EMERGENCY MEDICINE

## 2022-11-04 PROCEDURE — 82550 ASSAY OF CK (CPK): CPT

## 2022-11-04 PROCEDURE — 96375 TX/PRO/DX INJ NEW DRUG ADDON: CPT

## 2022-11-04 PROCEDURE — 2580000003 HC RX 258: Performed by: EMERGENCY MEDICINE

## 2022-11-04 PROCEDURE — 99285 EMERGENCY DEPT VISIT HI MDM: CPT

## 2022-11-04 PROCEDURE — 71260 CT THORAX DX C+: CPT

## 2022-11-04 PROCEDURE — 6360000002 HC RX W HCPCS: Performed by: EMERGENCY MEDICINE

## 2022-11-04 PROCEDURE — 87502 INFLUENZA DNA AMP PROBE: CPT

## 2022-11-04 PROCEDURE — 6360000004 HC RX CONTRAST MEDICATION: Performed by: RADIOLOGY

## 2022-11-04 PROCEDURE — 84484 ASSAY OF TROPONIN QUANT: CPT

## 2022-11-04 PROCEDURE — 87449 NOS EACH ORGANISM AG IA: CPT

## 2022-11-04 RX ORDER — KETOROLAC TROMETHAMINE 15 MG/ML
15 INJECTION, SOLUTION INTRAMUSCULAR; INTRAVENOUS ONCE
Status: COMPLETED | OUTPATIENT
Start: 2022-11-04 | End: 2022-11-04

## 2022-11-04 RX ORDER — DIPHENHYDRAMINE HYDROCHLORIDE 50 MG/ML
25 INJECTION INTRAMUSCULAR; INTRAVENOUS ONCE
Status: COMPLETED | OUTPATIENT
Start: 2022-11-04 | End: 2022-11-04

## 2022-11-04 RX ORDER — 0.9 % SODIUM CHLORIDE 0.9 %
1000 INTRAVENOUS SOLUTION INTRAVENOUS ONCE
Status: COMPLETED | OUTPATIENT
Start: 2022-11-04 | End: 2022-11-04

## 2022-11-04 RX ORDER — METOCLOPRAMIDE HYDROCHLORIDE 5 MG/ML
10 INJECTION INTRAMUSCULAR; INTRAVENOUS ONCE
Status: COMPLETED | OUTPATIENT
Start: 2022-11-04 | End: 2022-11-04

## 2022-11-04 RX ADMIN — KETOROLAC TROMETHAMINE 15 MG: 15 INJECTION, SOLUTION INTRAMUSCULAR; INTRAVENOUS at 21:10

## 2022-11-04 RX ADMIN — METOCLOPRAMIDE 10 MG: 5 INJECTION, SOLUTION INTRAMUSCULAR; INTRAVENOUS at 21:10

## 2022-11-04 RX ADMIN — SODIUM CHLORIDE 1000 ML: 9 INJECTION, SOLUTION INTRAVENOUS at 21:09

## 2022-11-04 RX ADMIN — DIPHENHYDRAMINE HYDROCHLORIDE 25 MG: 50 INJECTION, SOLUTION INTRAMUSCULAR; INTRAVENOUS at 21:09

## 2022-11-04 RX ADMIN — IOPAMIDOL 70 ML: 755 INJECTION, SOLUTION INTRAVENOUS at 22:20

## 2022-11-04 ASSESSMENT — PAIN - FUNCTIONAL ASSESSMENT: PAIN_FUNCTIONAL_ASSESSMENT: NONE - DENIES PAIN

## 2022-11-04 ASSESSMENT — ENCOUNTER SYMPTOMS
BLOOD IN STOOL: 0
NAUSEA: 0
ABDOMINAL PAIN: 0
TROUBLE SWALLOWING: 0
RHINORRHEA: 0
COLOR CHANGE: 0
COUGH: 0
SHORTNESS OF BREATH: 0
VOMITING: 0
DIARRHEA: 0

## 2022-11-04 NOTE — ED PROVIDER NOTES
ED PROVIDER NOTE    Chief Complaint   Patient presents with    Fever     CHILLS/ SICK AFTER FLU SHOT/ HAD FEVER ON OCT 25/ BURNING UP THIS AM BUT DID NOT TAKE TEMP/ HAD APPT WITH  BUT TO SICK       HPI:  11/4/22,   Time: 7:37 PM EDT       Dawit Rivera is a 61 y.o. male presenting to the ED for fever and chills. Gradual onset 2 weeks ago after getting flu shot, persistent since onset, moderate in severity, no aggravating/alleviating factors. Associated headache, bodyaches, chills. No cough, congestion, rhinorrhea, chest pain, nausea, vomiting, diarrhea, abdominal pain. Decreased po intake and urine output. No drug/etoh use. No known hx of aspiration but states he does sometimes have difficulty swallowing. Chart review: hx of COPD, HLD, depression    Review of Systems:     Review of Systems   Constitutional:  Positive for appetite change, chills, fatigue and fever. HENT:  Negative for congestion, rhinorrhea and trouble swallowing. Eyes:  Negative for visual disturbance. Respiratory:  Negative for cough and shortness of breath. Cardiovascular:  Negative for chest pain and leg swelling. Gastrointestinal:  Negative for abdominal pain, blood in stool, diarrhea, nausea and vomiting. Genitourinary:  Negative for decreased urine volume, difficulty urinating, dysuria, frequency, hematuria and urgency. Musculoskeletal:  Positive for myalgias. Negative for neck pain and neck stiffness. Skin:  Negative for color change. Neurological:  Positive for headaches. Negative for dizziness, syncope, weakness, light-headedness and numbness.        --------------------------------------------- PAST HISTORY ---------------------------------------------  Past Medical History:   Past Medical History:   Diagnosis Date    COPD (chronic obstructive pulmonary disease) ([facility] [address]     Depression     Hyperlipidemia        Past Surgical History:   Past Surgical History:   Procedure Laterality Date    CHOLECYSTECTOMY, LAPAROSCOPIC  7-6-15       Social History:   Social History     Socioeconomic History    Marital status:      Spouse name: None    Number of children: None    Years of education: None    Highest education level: None   Tobacco Use    Smoking status: Every Day     Packs/day: 1.00     Years: 42.00     Pack years: 42.00     Types: Cigarettes    Smokeless tobacco: Never   Vaping Use    Vaping Use: Never used   Substance and Sexual Activity    Alcohol use: No     Alcohol/week: 0.0 standard drinks    Drug use: Yes     Types: Marijuana (Weed)     Comment: occasional last used 2/12/18    Sexual activity: Not Currently     Social Determinants of Health     Financial Resource Strain: Low Risk     Difficulty of Paying Living Expenses: Not hard at all   Food Insecurity: No Food Insecurity    Worried About Running Out of Food in the Last Year: Never true    Ran Out of Food in the Last Year: Never true       Family History:   Family History   Problem Relation Age of Onset    Cancer Mother         breast cancer    High Blood Pressure Father        The patients home medications have been reviewed. Allergies: Allergies   Allergen Reactions    Clindamycin/Lincomycin Shortness Of Breath    Celexa [Citalopram Hydrobromide] Other (See Comments)     Severe mood swings    Linzess [Linaclotide] Diarrhea    Penicillins Hives    Ciprofloxacin Rash           ---------------------------------------------------PHYSICAL EXAM--------------------------------------    BP (!) 128/91   Pulse (!) 114   Temp 97.8 °F (36.6 °C)   Resp 22   SpO2 100%     Physical Exam  Vitals and nursing note reviewed. Constitutional:       General: He is not in acute distress. Appearance: He is not toxic-appearing. HENT:      Mouth/Throat:      Mouth: Mucous membranes are dry. Eyes:      General: No scleral icterus. Extraocular Movements: Extraocular movements intact. Pupils: Pupils are equal, round, and reactive to light. Cardiovascular:      Rate and Rhythm: Regular rhythm. Tachycardia present. Pulses: Normal pulses. Heart sounds: Normal heart sounds. No murmur heard. Pulmonary:      Effort: Pulmonary effort is normal. No respiratory distress. Breath sounds: Normal breath sounds. No wheezing or rales. Abdominal:      General: There is no distension. Palpations: Abdomen is soft. Tenderness: There is no abdominal tenderness. There is no guarding or rebound. Musculoskeletal:         General: No swelling or tenderness. Normal range of motion. Cervical back: Normal range of motion and neck supple. No rigidity. No muscular tenderness. Comments: Radial, DP, and PT pulses 2+ bilaterally. Skin:     General: Skin is warm and dry. Neurological:      Mental Status: He is alert and oriented to person, place, and time. Comments: Resting tremor. Strength 5/5 and sensation grossly intact to light touch and equal bilaterally throughout all extremities          -------------------------------------------------- RESULTS -------------------------------------------------  I have personally reviewed all laboratory and imaging results for this patient. Results are listed below.      LABS:  Labs Reviewed   CBC WITH AUTO DIFFERENTIAL - Abnormal; Notable for the following components:       Result Value    WBC 16.1 (*)     Platelets 521 (*)     Lymphocytes % 11.9 (*)     Neutrophils Absolute 12.79 (*)     Monocytes Absolute 1.18 (*)     Eosinophils Absolute 0.03 (*)     All other components within normal limits   COMPREHENSIVE METABOLIC PANEL - Abnormal; Notable for the following components:    Chloride 97 (*)     BUN 5 (*)     Albumin 3.4 (*)     ALT 52 (*)     AST 52 (*)     All other components within normal limits   TROPONIN - Abnormal; Notable for the following components:    Troponin, High Sensitivity 13 (*)     All other components within normal limits   URINALYSIS WITH MICROSCOPIC - Abnormal; Notable for the following components:    Blood, Urine TRACE (*)     Urobilinogen, Urine 4.0 (*)     All other components within normal limits   COVID-19, RAPID   RAPID INFLUENZA A/B ANTIGENS   CULTURE, BLOOD 1   CULTURE, BLOOD 1   MAGNESIUM   CK       RADIOLOGY:  Interpreted personally and by Radiologist.  CT CHEST W CONTRAST   Final Result   8.6 cm infiltrate of the superior segment left lower lobe with associated   mild pleural thickening up to 13 mm. There is no adenopathy. Taken   together, the findings suggest infectious process such as pulmonary abscess   and developing empyema over malignancy. However, careful clinical   correlation and imaging follow-up until resolution is required in order to   exclude malignancy. RECOMMENDATIONS:   Careful clinical correlation and follow up recommended. XR CHEST (2 VW)   Final Result   Dense triangular-shaped infiltrate in the superior segment left lower lobe   with left hilar fullness suspicious for malignancy. RECOMMENDATION:   I recommend CT scan of the chest and mediastinum with IV contrast media. EKG:  This EKG is signed and interpreted by the EP. Normal sinus rhythm, vent rate 92bpm, normal axis and intervals, no acute injury pattern, no clinically significant change compared w/ prior EKG       ------------------------- NURSING NOTES AND VITALS REVIEWED ---------------------------   The nursing notes within the ED encounter and vital signs as below have been reviewed by myself. BP (!) 128/91   Pulse (!) 114   Temp 97.8 °F (36.6 °C)   Resp 22   SpO2 100%   Oxygen Saturation Interpretation: Normal    The patients available past medical records and past encounters were reviewed.         ------------------------------ ED COURSE/MEDICAL DECISION MAKING----------------------  Medications   meropenem (MERREM) 1,000 mg in sodium chloride 0.9 % 100 mL IVPB ([id number]) (1,000 mg IntraVENous New Bag 11/5/22 0030)   0.9 % sodium chloride bolus (0 mLs IntraVENous Stopped 22)   ketorolac (TORADOL) injection 15 mg (15 mg IntraVENous Given 22)   diphenhydrAMINE (BENADRYL) injection 25 mg (25 mg IntraVENous Given 22)   metoclopramide (REGLAN) injection 10 mg (10 mg IntraVENous Given 22)   iopamidol (ISOVUE-370) 76 % injection 70 mL (70 mLs IntraVENous Given 22)     Counseling: The emergency provider has spoken with the patient and discussed todays results, in addition to providing specific details for the plan of care and counseling regarding the diagnosis and prognosis. Questions are answered at this time and they are agreeable with the plan. ED Course/Medical Decision Makin y.o. male here with fever and flu like illness. Tachycardic on arrival, hemodynamically stable. Normal O2 sats on room air. Tachycardia improving w/ IV fluids. Workup notable for leukocytosis and left lung infiltrates concerning for possible lung abscess. Patient has allergy to clindamycin, penicillins, and fluoroquinolones, therefore he was treated w/ IV meropenem for lung abscess. Admitted in stable condition for further management.       --------------------------------- IMPRESSION AND DISPOSITION ---------------------------------    IMPRESSION  1. Abscess of lower lobe of left lung with pneumonia ([facility] [address]        DISPOSITION  Disposition: Admit to telemetry  Patient condition is stable    NOTE: This report was transcribed using voice recognition software.  Every effort was made to ensure accuracy; however, inadvertent computerized transcription errors may be present    Yessenia Beckham MD  Attending Emergency Physician         Yessenia Beckham MD  [phone]

## 2022-11-05 ENCOUNTER — APPOINTMENT (OUTPATIENT)
Dept: CT IMAGING | Age: 57
DRG: 871 | End: 2022-11-05
Payer: MEDICARE

## 2022-11-05 PROBLEM — J85.1 ABSCESS OF LOWER LOBE OF LEFT LUNG WITH PNEUMONIA (HCC): Status: ACTIVE | Noted: 2022-11-05

## 2022-11-05 LAB
ADENOVIRUS BY PCR: NOT DETECTED
BACTERIA: ABNORMAL /HPF
BILIRUBIN URINE: NEGATIVE
BLOOD, URINE: ABNORMAL
BORDETELLA PARAPERTUSSIS BY PCR: NOT DETECTED
BORDETELLA PERTUSSIS BY PCR: NOT DETECTED
C-REACTIVE PROTEIN: 13.4 MG/DL (ref 0–0.4)
CHLAMYDOPHILIA PNEUMONIAE BY PCR: NOT DETECTED
CLARITY: CLEAR
COLOR: YELLOW
CORONAVIRUS 229E BY PCR: NOT DETECTED
CORONAVIRUS HKU1 BY PCR: NOT DETECTED
CORONAVIRUS NL63 BY PCR: NOT DETECTED
CORONAVIRUS OC43 BY PCR: NOT DETECTED
EKG ATRIAL RATE: 92 BPM
EKG P AXIS: 68 DEGREES
EKG P-R INTERVAL: 134 MS
EKG Q-T INTERVAL: 354 MS
EKG QRS DURATION: 82 MS
EKG QTC CALCULATION (BAZETT): 437 MS
EKG R AXIS: 59 DEGREES
EKG T AXIS: 74 DEGREES
EKG VENTRICULAR RATE: 92 BPM
GLUCOSE URINE: NEGATIVE MG/DL
HUMAN METAPNEUMOVIRUS BY PCR: NOT DETECTED
HUMAN RHINOVIRUS/ENTEROVIRUS BY PCR: NOT DETECTED
INFLUENZA A BY PCR: NOT DETECTED
INFLUENZA B BY PCR: NOT DETECTED
KETONES, URINE: NEGATIVE MG/DL
LEUKOCYTE ESTERASE, URINE: NEGATIVE
MYCOPLASMA PNEUMONIAE BY PCR: NOT DETECTED
NITRITE, URINE: NEGATIVE
PARAINFLUENZA VIRUS 1 BY PCR: NOT DETECTED
PARAINFLUENZA VIRUS 2 BY PCR: NOT DETECTED
PARAINFLUENZA VIRUS 3 BY PCR: NOT DETECTED
PARAINFLUENZA VIRUS 4 BY PCR: NOT DETECTED
PH UA: 7 (ref 5–9)
PROCALCITONIN: 0.07 NG/ML (ref 0–0.08)
PROTEIN UA: NEGATIVE MG/DL
RBC UA: ABNORMAL /HPF (ref 0–2)
RESPIRATORY SYNCYTIAL VIRUS BY PCR: NOT DETECTED
SARS-COV-2, PCR: NOT DETECTED
SEDIMENTATION RATE, ERYTHROCYTE: 95 MM/HR (ref 0–15)
SPECIFIC GRAVITY UA: <=1.005 (ref 1–1.03)
TROPONIN, HIGH SENSITIVITY: 7 NG/L (ref 0–11)
UROBILINOGEN, URINE: 4 E.U./DL
WBC UA: ABNORMAL /HPF (ref 0–5)

## 2022-11-05 PROCEDURE — 6360000002 HC RX W HCPCS: Performed by: SPECIALIST

## 2022-11-05 PROCEDURE — 84145 PROCALCITONIN (PCT): CPT

## 2022-11-05 PROCEDURE — 2580000003 HC RX 258: Performed by: EMERGENCY MEDICINE

## 2022-11-05 PROCEDURE — 2580000003 HC RX 258: Performed by: SPECIALIST

## 2022-11-05 PROCEDURE — 6360000002 HC RX W HCPCS: Performed by: NURSE PRACTITIONER

## 2022-11-05 PROCEDURE — 87449 NOS EACH ORGANISM AG IA: CPT

## 2022-11-05 PROCEDURE — 74177 CT ABD & PELVIS W/CONTRAST: CPT

## 2022-11-05 PROCEDURE — 0202U NFCT DS 22 TRGT SARS-COV-2: CPT

## 2022-11-05 PROCEDURE — 6360000004 HC RX CONTRAST MEDICATION: Performed by: RADIOLOGY

## 2022-11-05 PROCEDURE — 86140 C-REACTIVE PROTEIN: CPT

## 2022-11-05 PROCEDURE — 36415 COLL VENOUS BLD VENIPUNCTURE: CPT

## 2022-11-05 PROCEDURE — 2500000003 HC RX 250 WO HCPCS: Performed by: INTERNAL MEDICINE

## 2022-11-05 PROCEDURE — 6360000002 HC RX W HCPCS: Performed by: EMERGENCY MEDICINE

## 2022-11-05 PROCEDURE — 2580000003 HC RX 258: Performed by: NURSE PRACTITIONER

## 2022-11-05 PROCEDURE — 84484 ASSAY OF TROPONIN QUANT: CPT

## 2022-11-05 PROCEDURE — 6370000000 HC RX 637 (ALT 250 FOR IP)

## 2022-11-05 PROCEDURE — 85651 RBC SED RATE NONAUTOMATED: CPT

## 2022-11-05 PROCEDURE — 1200000000 HC SEMI PRIVATE

## 2022-11-05 PROCEDURE — 2580000003 HC RX 258: Performed by: INTERNAL MEDICINE

## 2022-11-05 PROCEDURE — 6370000000 HC RX 637 (ALT 250 FOR IP): Performed by: NURSE PRACTITIONER

## 2022-11-05 PROCEDURE — 87040 BLOOD CULTURE FOR BACTERIA: CPT

## 2022-11-05 PROCEDURE — 96365 THER/PROPH/DIAG IV INF INIT: CPT

## 2022-11-05 PROCEDURE — 94640 AIRWAY INHALATION TREATMENT: CPT

## 2022-11-05 RX ORDER — ATORVASTATIN CALCIUM 20 MG/1
20 TABLET, FILM COATED ORAL DAILY
Status: DISCONTINUED | OUTPATIENT
Start: 2022-11-05 | End: 2022-11-09 | Stop reason: HOSPADM

## 2022-11-05 RX ORDER — HYDRALAZINE HYDROCHLORIDE 20 MG/ML
5 INJECTION INTRAMUSCULAR; INTRAVENOUS EVERY 4 HOURS PRN
Status: DISCONTINUED | OUTPATIENT
Start: 2022-11-05 | End: 2022-11-09 | Stop reason: HOSPADM

## 2022-11-05 RX ORDER — MAGNESIUM SULFATE 1 G/100ML
1000 INJECTION INTRAVENOUS PRN
Status: DISCONTINUED | OUTPATIENT
Start: 2022-11-05 | End: 2022-11-09 | Stop reason: HOSPADM

## 2022-11-05 RX ORDER — ACETYLCYSTEINE 200 MG/ML
600 SOLUTION ORAL; RESPIRATORY (INHALATION) 3 TIMES DAILY
Status: DISPENSED | OUTPATIENT
Start: 2022-11-05 | End: 2022-11-08

## 2022-11-05 RX ORDER — ALBUTEROL SULFATE 2.5 MG/3ML
2.5 SOLUTION RESPIRATORY (INHALATION) EVERY 4 HOURS PRN
Status: DISCONTINUED | OUTPATIENT
Start: 2022-11-05 | End: 2022-11-09 | Stop reason: HOSPADM

## 2022-11-05 RX ORDER — ACETAMINOPHEN 325 MG/1
TABLET ORAL
Status: COMPLETED
Start: 2022-11-05 | End: 2022-11-05

## 2022-11-05 RX ORDER — BUDESONIDE 0.5 MG/2ML
0.5 INHALANT ORAL 2 TIMES DAILY
Status: DISCONTINUED | OUTPATIENT
Start: 2022-11-05 | End: 2022-11-09 | Stop reason: HOSPADM

## 2022-11-05 RX ORDER — ARFORMOTEROL TARTRATE 15 UG/2ML
15 SOLUTION RESPIRATORY (INHALATION) 2 TIMES DAILY
Status: DISCONTINUED | OUTPATIENT
Start: 2022-11-05 | End: 2022-11-09 | Stop reason: HOSPADM

## 2022-11-05 RX ORDER — FOLIC ACID 1 MG/1
1 TABLET ORAL DAILY
Status: DISCONTINUED | OUTPATIENT
Start: 2022-11-05 | End: 2022-11-09 | Stop reason: HOSPADM

## 2022-11-05 RX ORDER — POTASSIUM CHLORIDE 20 MEQ/1
40 TABLET, EXTENDED RELEASE ORAL PRN
Status: DISCONTINUED | OUTPATIENT
Start: 2022-11-05 | End: 2022-11-09 | Stop reason: HOSPADM

## 2022-11-05 RX ORDER — METHYLPREDNISOLONE SODIUM SUCCINATE 40 MG/ML
40 INJECTION, POWDER, LYOPHILIZED, FOR SOLUTION INTRAMUSCULAR; INTRAVENOUS EVERY 8 HOURS
Status: DISCONTINUED | OUTPATIENT
Start: 2022-11-05 | End: 2022-11-06

## 2022-11-05 RX ORDER — POTASSIUM CHLORIDE 7.45 MG/ML
10 INJECTION INTRAVENOUS PRN
Status: DISCONTINUED | OUTPATIENT
Start: 2022-11-05 | End: 2022-11-09 | Stop reason: HOSPADM

## 2022-11-05 RX ORDER — VITAMIN B COMPLEX
2000 TABLET ORAL DAILY
Status: DISCONTINUED | OUTPATIENT
Start: 2022-11-05 | End: 2022-11-09 | Stop reason: HOSPADM

## 2022-11-05 RX ORDER — TRAZODONE HYDROCHLORIDE 50 MG/1
100 TABLET ORAL NIGHTLY
Status: DISCONTINUED | OUTPATIENT
Start: 2022-11-05 | End: 2022-11-09 | Stop reason: HOSPADM

## 2022-11-05 RX ORDER — ACETAMINOPHEN 325 MG/1
650 TABLET ORAL EVERY 6 HOURS PRN
Status: DISCONTINUED | OUTPATIENT
Start: 2022-11-05 | End: 2022-11-09 | Stop reason: HOSPADM

## 2022-11-05 RX ADMIN — BUDESONIDE 500 MCG: 0.5 SUSPENSION RESPIRATORY (INHALATION) at 06:37

## 2022-11-05 RX ADMIN — Medication 2000 UNITS: at 09:54

## 2022-11-05 RX ADMIN — MEROPENEM 1000 MG: 1 INJECTION, POWDER, FOR SOLUTION INTRAVENOUS at 00:30

## 2022-11-05 RX ADMIN — VANCOMYCIN HYDROCHLORIDE 1750 MG: 10 INJECTION, POWDER, LYOPHILIZED, FOR SOLUTION INTRAVENOUS at 08:32

## 2022-11-05 RX ADMIN — SODIUM CHLORIDE 100 MG: 9 INJECTION, SOLUTION INTRAVENOUS at 14:36

## 2022-11-05 RX ADMIN — IOPAMIDOL 50 ML: 612 INJECTION, SOLUTION INTRAVENOUS at 09:06

## 2022-11-05 RX ADMIN — ARFORMOTEROL TARTRATE 15 MCG: 15 SOLUTION RESPIRATORY (INHALATION) at 06:37

## 2022-11-05 RX ADMIN — FOLIC ACID 1 MG: 1 TABLET ORAL at 09:55

## 2022-11-05 RX ADMIN — METHYLPREDNISOLONE SODIUM SUCCINATE 40 MG: 40 INJECTION, POWDER, FOR SOLUTION INTRAMUSCULAR; INTRAVENOUS at 08:30

## 2022-11-05 RX ADMIN — BUDESONIDE 500 MCG: 0.5 SUSPENSION RESPIRATORY (INHALATION) at 19:07

## 2022-11-05 RX ADMIN — ATORVASTATIN CALCIUM 20 MG: 20 TABLET, FILM COATED ORAL at 09:54

## 2022-11-05 RX ADMIN — METHYLPREDNISOLONE SODIUM SUCCINATE 40 MG: 40 INJECTION, POWDER, FOR SOLUTION INTRAMUSCULAR; INTRAVENOUS at 14:37

## 2022-11-05 RX ADMIN — ALBUTEROL SULFATE 2.5 MG: 2.5 SOLUTION RESPIRATORY (INHALATION) at 06:36

## 2022-11-05 RX ADMIN — DOXYCYCLINE 100 MG: 100 INJECTION, POWDER, LYOPHILIZED, FOR SOLUTION INTRAVENOUS at 12:39

## 2022-11-05 RX ADMIN — ACETAMINOPHEN 650 MG: 325 TABLET ORAL at 07:23

## 2022-11-05 RX ADMIN — TRAZODONE HYDROCHLORIDE 100 MG: 50 TABLET ORAL at 23:19

## 2022-11-05 RX ADMIN — METHYLPREDNISOLONE SODIUM SUCCINATE 40 MG: 40 INJECTION, POWDER, FOR SOLUTION INTRAMUSCULAR; INTRAVENOUS at 23:19

## 2022-11-05 RX ADMIN — IOPAMIDOL 75 ML: 755 INJECTION, SOLUTION INTRAVENOUS at 09:06

## 2022-11-05 RX ADMIN — MEROPENEM 1000 MG: 1 INJECTION, POWDER, FOR SOLUTION INTRAVENOUS at 09:48

## 2022-11-05 RX ADMIN — ARFORMOTEROL TARTRATE 15 MCG: 15 SOLUTION RESPIRATORY (INHALATION) at 19:07

## 2022-11-05 ASSESSMENT — PAIN - FUNCTIONAL ASSESSMENT
PAIN_FUNCTIONAL_ASSESSMENT: NONE - DENIES PAIN
PAIN_FUNCTIONAL_ASSESSMENT: NONE - DENIES PAIN

## 2022-11-05 ASSESSMENT — ENCOUNTER SYMPTOMS: TACHYPNEA: 1

## 2022-11-05 NOTE — CONSULTS
[address] [place] [address] [facility]    Consultation Note     Admit Date: 11/4/2022  6:25 PM    Reason for Consult:   Left lower lobe pneumonia with fevers and leukocytosis    Attending Physician:  Arin Manriquez DO     Chief Complaint: Fevers chills generalized malaise    HISTORY OF PRESENT ILLNESS:   The patient is a 61 y.o. man not known to the Infectious Diseases service. The patient is admitted through the emergency room with fevers chills shortness of breath with productive cough. Patient has generalized malaise as well as diffuse myalgias and headaches. In the emergency room his white count was 16,000 hemoglobin 12.7 with LFTs of 52 and C-reactive protein is 13.4. His BUN and creatinine was 5 and 0.9. His albumin was only 3.4 and his respiratory viral panel was nondiagnostic urine for Legionella and strep pneumo are pending respiratory culture was sent. Empirically he was started on Merrem, Vibramycin, Solu-Medrol, vancomycin. He claims to have allergies or reactions with a rash to quinolones, hives with penicillin and shortness of breath with clindamycin. Patient recently got a flu shot back 2 weeks prior to this admission. The patient admits to taking trazodone and Benadryl to help him sleep.   He also admitted to snort cocaine intermittently last snorting session was in October          Past Medical History:        Diagnosis Date    COPD (chronic obstructive pulmonary disease) ([facility])     Depression     Hyperlipidemia      Past Surgical History:        Procedure Laterality Date    CHOLECYSTECTOMY, LAPAROSCOPIC  7-6-15     Current Medications:   Scheduled Meds:   atorvastatin  20 mg Oral Daily    Vitamin D  2,000 Units Oral Daily    folic acid  1 mg Oral Daily    traZODone  100 mg Oral Nightly    budesonide  0.5 mg Nebulization BID    methylPREDNISolone  40 mg IntraVENous Q8H    Arformoterol Tartrate  15 mcg Nebulization BID    meropenem  1,000 mg IntraVENous Q8H    vancomycin  1,250 mg IntraVENous Q12H    doxycycline (VIBRAMYCIN) IV  100 mg IntraVENous Q12H     Continuous Infusions:  PRN Meds:albuterol, hydrALAZINE, magnesium sulfate, sodium phosphate IVPB **OR** sodium phosphate IVPB, potassium chloride **OR** potassium alternative oral replacement **OR** potassium chloride, acetaminophen    Allergies:  Clindamycin/lincomycin, Celexa [citalopram hydrobromide], Linzess [linaclotide], Penicillins, and Ciprofloxacin    Social History:   Social History     Socioeconomic History    Marital status:      Spouse name: None    Number of children: None    Years of education: None    Highest education level: None   Tobacco Use    Smoking status: Every Day     Packs/day: 1.00     Years: 42.00     Pack years: 42.00     Types: Cigarettes    Smokeless tobacco: Never   Vaping Use    Vaping Use: Never used   Substance and Sexual Activity    Alcohol use: No     Alcohol/week: 0.0 standard drinks    Drug use: Yes     Types: Marijuana (Weed)     Comment: occasional last used 2/12/18    Sexual activity: Not Currently     Social Determinants of Health     Financial Resource Strain: Low Risk     Difficulty of Paying Living Expenses: Not hard at all   Food Insecurity: No Food Insecurity    Worried About Running Out of Food in the Last Year: Never true    Ran Out of Food in the Last Year: Never true         Family History:       Problem Relation Age of Onset    Cancer Mother         breast cancer    High Blood Pressure Father    . Otherwise non-pertinent to the chief complaint. REVIEW OF SYSTEMS:    CONSTITUTIONAL: Positive chills, positive fevers . No loss of weight. EYES:  No double vision or drainage from eyes, ears or throat. HEENT:  No neck stiffness. No dysphagia. No drainage from eyes, ears or throat  RESPIRATORY: Positive cough, productive sputum   CARDIOVASCULAR:  No chest pain, palpitations, orthopnea or dyspnea on exertion.   GASTROINTESTINAL:  No nausea, vomiting, diarrhea or constipation or hematochezia   GENITOURINARY:  No frequency burning dysuria or hematuria. INTEGUMENT/BREAST:  No rash or breast masses. HEMATOLOGIC/LYMPHATIC:  No lymphadenopathy or blood dyscrasics. ALLERGIC/IMMUNOLOGIC:  No anaphylaxis. ENDOCRINE:  No polyuria or polydipsia or temperature intolerance. MUSCULOSKELETAL: Positive myalgia or arthralgia. Full ROM. NEUROLOGICAL:  No focal motor sensory deficit. BEHAVIOR/PSYCH:  No psychosis. PHYSICAL EXAM:    Vitals:    BP (!) 102/59   Pulse 75   Temp 98.7 °F (37.1 °C) (Oral)   Resp 20   Ht 6' (1.829 m)   Wt 215 lb (97.5 kg)   SpO2 92%   BMI 29.16 kg/m²   Constitutional: The patient is awake, alert, and oriented. Skin: Warm and dry. No rashes were noted. No jaundice. HEENT: Eyes show round, and reactive pupils. Moist mucous membranes, no ulcerations, no thrush. Edentulous in the upper mouth with poor dentition lower. Dental disease  Neck: Supple to movements. No lymphadenopathy. Chest: No use of accessory muscles to breathe. Symmetrical expansion. Auscultation reveals slight wheeze on the right wheezing, decreased breath sounds bilaterally. Cardiovascular: S1 and S2 are rhythmic and regular. No murmurs appreciated. Abdomen: Positive bowel sounds to auscultation. Benign to palpation. No masses felt. No hepatosplenomegaly. Genitourinary: Male  Extremities: No clubbing, no cyanosis, no edema.   Musculoskeletal: Equal and symmetrical  Neurological: No focal  Lines: peripheral      CBC+dif:  Recent Labs     11/04/22 1959   WBC 16.1*   HGB 12.7   HCT 38.3   MCV 89.9   *   NEUTROABS 12.79*     Lab Results   Component Value Date    CRP 13.4 (H) 11/05/2022     No results found for: CRPHS  Lab Results   Component Value Date    SEDRATE 95 (H) 11/05/2022     Lab Results   Component Value Date    ALT 52 (H) 11/04/2022    AST 52 (H) 11/04/2022    ALKPHOS 85 11/04/2022    BILITOT 0.6 11/04/2022     Lab Results   Component Value Date/Time     11/04/2022 07:59 PM    K 3.5 11/04/2022 07:59 PM    K 4.6 03/03/2018 06:00 AM    CL 97 11/04/2022 07:59 PM    CO2 24 11/04/2022 07:59 PM    BUN 5 11/04/2022 07:59 PM    CREATININE 0.9 11/04/2022 07:59 PM    GFRAA >60 04/11/2022 12:58 PM    LABGLOM >60 11/04/2022 07:59 PM    GLUCOSE 96 11/04/2022 07:59 PM    GLUCOSE 91 10/14/2022 10:09 AM    PROT 7.1 11/04/2022 07:59 PM    LABALBU 3.4 11/04/2022 07:59 PM    CALCIUM 9.0 11/04/2022 07:59 PM    BILITOT 0.6 11/04/2022 07:59 PM    ALKPHOS 85 11/04/2022 07:59 PM    AST 52 11/04/2022 07:59 PM    ALT 52 11/04/2022 07:59 PM       Lab Results   Component Value Date/Time    PROTIME 11.8 07/14/2015 04:15 AM    INR 1.0 07/14/2015 04:15 AM       Lab Results   Component Value Date/Time    TSH 1.830 10/14/2022 10:09 AM       Lab Results   Component Value Date/Time    COLORU Yellow 11/04/2022 11:58 PM    PHUR 7.0 11/04/2022 11:58 PM    WBCUA NONE 11/04/2022 11:58 PM    RBCUA 0-1 11/04/2022 11:58 PM    MUCUS Present 09/02/2020 10:30 AM    BACTERIA NONE SEEN 11/04/2022 11:58 PM    CLARITYU Clear 11/04/2022 11:58 PM    SPECGRAV <=1.005 11/04/2022 11:58 PM    LEUKOCYTESUR Negative 11/04/2022 11:58 PM    UROBILINOGEN 4.0 11/04/2022 11:58 PM    BILIRUBINUR Negative 11/04/2022 11:58 PM    BLOODU TRACE 11/04/2022 11:58 PM    GLUCOSEU Negative 11/04/2022 11:58 PM       No results found for: [id number], BEART, [id number], PHART, THGBART, UVJ3GUV, PO2ART, LLF3VVQ  Radiology:  CT ABDOMEN PELVIS W IV CONTRAST Additional Contrast? Oral   Final Result   1. Left lung findings, as described above. There are discussed in full   detail on the report from the patient's CT scan of the chest performed the   same day. Please refer to that transcription. 2.  No acute abdominal or pelvic pathology. 3.  Small amount of fluid in the ascending colon, which can be seen in   diarrheal disease.          CT CHEST W CONTRAST   Final Result   8.6 cm infiltrate of the superior segment left lower lobe with associated   mild pleural thickening up to 13 mm. There is no adenopathy. Taken   together, the findings suggest infectious process such as pulmonary abscess   and developing empyema over malignancy. However, careful clinical   correlation and imaging follow-up until resolution is required in order to   exclude malignancy. RECOMMENDATIONS:   Careful clinical correlation and follow up recommended. XR CHEST (2 VW)   Final Result   Dense triangular-shaped infiltrate in the superior segment left lower lobe   with left hilar fullness suspicious for malignancy. RECOMMENDATION:   I recommend CT scan of the chest and mediastinum with IV contrast media. Microbiology:  Pending  No results for input(s): BC in the last 72 hours. No results for input(s): ORG in the last 72 hours. No results for input(s): Chantal Dage in the last 72 hours. No results for input(s): STREPNEUMAGU in the last 72 hours. No results for input(s): LP1UAG in the last 72 hours. No results for input(s): ASO in the last 72 hours. No results for input(s): CULTRESP in the last 72 hours. Assessment:  Left lung pneumonia probably aspiration  Leukocytosis related to the above  Multiple drug allergies    Plan:    Cont ceftriaxone and Tygacil; urine for Legionella and strep pneumo are pending and the Tygacil with pickup anaerobes as well as Legionella but after the sputum cultures and urine Legionella antigen and strep pneumo antigen antimicrobials can be adjusted  Check cultures  Baseline ESR, CRP  Monitor labs  Will follow with you    Thank you for having us see this patient in consultation. I will be discussing this case with the treating physicians.       Electronically signed by Darcie Kohler MD on 11/5/2022 at 12:35 PM

## 2022-11-05 NOTE — H&P
Department of Internal Medicine  History and Physical Examination     Primary Care Physician: Roxann Rincon DO   Admitting Physician:  Arin Manriquez DO  Admission date and time: 11/4/2022  6:25 PM    Room:  12/12  Admitting diagnosis: Abscess of lower lobe of left lung with pneumonia [facility]) [J85.1]    Patient Name: Yogesh Goode  MRN: [id number]    Date of Service: 11/5/2022     Chief Complaint: Fever and chills    HISTORY OF PRESENT ILLNESS:    Yogesh Goode is a 25-year-old male patient presented to [address] [place] [address] emergency department with multiple days worth of fever and chills. Both he and his 69-year-old roommate have been sick with similar symptoms. He was evaluated in the ER where a metabolic panel did not reveal any significant abnormalities or electrolyte disturbance. Renal function was intact. High-sensitivity troponin x1 was not elevated nor was CK. LFTs showed very mild transaminitis but were otherwise unremarkable. CBC revealed leukocytosis with relative neutrophilic predominance but with total neutrophilic percentage being within normal range. Rapid COVID and influenza testing returned negative. Urinalysis was unremarkable. CTA of the chest was obtained and revealed an 8.6 cm infiltrate of the superior segment of the left lower lobe with pleural thickening up to 13 mm, without adenopathy suggestive of inflammatory process such as pulmonary abscess or empyema versus malignancy based upon the patient's smoking history. He met criteria for sepsis based upon leukocytosis and tachycardia on arrival.  Case was discussed with ER physician with plans for admission to the service of Dr. Valentine Whitaker for further care and management.     PAST MEDICAL Hx:  Past Medical History:   Diagnosis Date    COPD (chronic obstructive pulmonary disease) ([facility] [address]     Depression     Hyperlipidemia        PAST SURGICAL Hx:   Past Surgical History:   Procedure Laterality Date    CHOLECYSTECTOMY, LAPAROSCOPIC  7-6-15 FAMILY Hx:  Family History   Problem Relation Age of Onset    Cancer Mother         breast cancer    High Blood Pressure Father        HOME MEDICATIONS:  Prior to Admission medications    Medication Sig Start Date End Date Taking? Authorizing Provider   nicotine (NICODERM CQ) 7 MG/24HR Place 1 patch onto the skin every 24 hours for 14 days Begin after completing 14mg patch x 2 weeks. Patient not taking: Reported on 11/4/2022 10/20/22 11/4/22  Yana Juarez DO   nicotine (NICODERM CQ) 14 MG/24HR Place 1 patch onto the skin every 24 hours for 14 days Begin after completing 21mg patch x 6 weeks.   Patient not taking: Reported on 11/4/2022 10/20/22 11/3/22  Yana Juarez DO   nicotine (NICODERM CQ) 21 MG/24HR Place 1 patch onto the skin every 24 hours  Patient not taking: Reported on 11/4/2022 10/20/22 12/1/22  Yana Juarez DO   fluticasone-umeclidin-vilant (TRELEGY ELLIPTA) 433-93.2-76 MCG/INH AEPB INHALE 1 PUFF INTO THE LUNGS DAILY 10/6/22   Vick Galindo DO   folic acid (FOLVITE) 1 MG tablet Take 1 tablet by mouth daily 9/8/22 3/7/23  Vick Galindo DO   pantoprazole (PROTONIX) 40 MG tablet TAKE ONE TABLET BY MOUTH EVERY DAY WITH BREAKFAST 9/8/22   Vick Galindo DO   atorvastatin (LIPITOR) 20 MG tablet Take 1 tablet by mouth daily 9/8/22 3/7/23  Vick Galindo DO   albuterol sulfate HFA (PROAIR HFA) 108 (90 Base) MCG/ACT inhaler INHALE TWO PUFFS INTO THE LUNGS EVERY 6 HOURS AS NEEDED FOR SHORTNESS OF BREATH 4/14/21   Vick Galindo DO   Cholecalciferol (VITAMIN D3) 2000 units CAPS TAKE ONE CAPSULE BY MOUTH EVERY DAY 6/19/19   Vick Galindo DO   traZODone (DESYREL) 100 MG tablet Take 100 mg by mouth nightly Takes 2 tabs nightly as needed    Historical Provider, MD       ALLERGIES:  Clindamycin/lincomycin, Celexa [citalopram hydrobromide], Linzess [linaclotide], Penicillins, and Ciprofloxacin    SOCIAL Hx:  Social History     Socioeconomic History    Marital status:      Spouse name: Not on file    Number of children: Not on file    Years of education: Not on file    Highest education level: Not on file   Occupational History    Not on file   Tobacco Use    Smoking status: Every Day     Packs/day: 1.00     Years: 42.00     Pack years: 42.00     Types: Cigarettes    Smokeless tobacco: Never   Vaping Use    Vaping Use: Never used   Substance and Sexual Activity    Alcohol use: No     Alcohol/week: 0.0 standard drinks    Drug use: Yes     Types: Marijuana (Weed)     Comment: occasional last used 2/12/18    Sexual activity: Not Currently   Other Topics Concern    Not on file   Social History Narrative    Not on file     Social Determinants of Health     Financial Resource Strain: Low Risk     Difficulty of Paying Living Expenses: Not hard at all   Food Insecurity: No Food Insecurity    Worried About Running Out of Food in the Last Year: Never true    Ran Out of Food in the Last Year: Never true   Transportation Needs: Not on file   Physical Activity: Not on file   Stress: Not on file   Social Connections: Not on file   Intimate Partner Violence: Not on file   Housing Stability: Not on file     Review of systems:  Constitutional:   Positive for significant fatigue and malaise , positive for 20 pound weight loss over the last 6 months. Positive for fever/chills  HEENT:   Denies ear pain, sore throat, sinus or eye problems. Cardiovascular:   Denies any chest pain, irregular heartbeats, or palpitations. Respiratory:   Negative for dyspnea at rest, positive for dyspnea on exertion, positive for coughing, positive for sputum, negative for hemoptysis  Gastrointestinal:   + nausea, -vomiting. + diarrhea, - constipation. + poor appetite and poor intake. - abdominal pain. Genitourinary:    Denies any urgency, frequency, hematuria. Voiding  without difficulty. Extremities:   Denies lower extremity swelling, edema or cyanosis.    Neurology:    Denies any headache or focal neurological deficits, positive for generalized weakness and fatigue without focal component  Psch:   Denies being anxious or depressed. Musculoskeletal:    Denies  myalgias, joint complaints or back pain. Integumentary:   Denies any rashes, ulcers, or excoriations. Denies bruising. Hematologic/Lymphatic:  Denies bruising or bleeding. PHYSICAL EXAM:  VITALS:  Vitals:    11/05/22 0600   BP:    Pulse: 86   Resp: 25   Temp:    SpO2:          CONSTITUTIONAL:    Awake, alert, cooperative, ill-appearing, no apparent distress    EYES:    PERRL, EOMI, sclera clear without icterus, conjunctiva normal    ENT:    Normocephalic, atraumatic, sinuses nontender on palpation. External ears without lesions. Oral pharynx with moist mucus membranes. NECK:    Supple, symmetrical, trachea midline, no adenopathy, thyroid symmetric, not enlarged and no tenderness, skin normal, no bruits, no JVD    HEMATOLOGIC/LYMPHATICS:    No cervical lymphadenopathy and no supraclavicular lymphadenopathy    LUNGS:    Symmetric. No increased work of breathing, diminished air exchange, clear to auscultation bilaterally, no wheezes, rhonchi, or rales,     CARDIOVASCULAR:    Normal apical impulse, regular rate and rhythm, normal S1 and S2, systolic murmur noted    ABDOMEN:    Obese contour, normal bowel sounds, soft, non-distended, non-tender, no rebound or guarding elicited on palpation     MUSCULOSKELETAL:    There is no redness, warmth, or swelling of the joints. Tone is normal.    NEUROLOGIC:    Awake, alert, oriented to name, place and time. Cranial nerves II-XII are grossly intact. Generally weak without focal deficit    SKIN:    No bruising or bleeding. No redness, warmth, or swelling    EXTREMITIES:    Peripheral pulses present. No edema, cyanosis, or swelling.     LABORATORY DATA:  CBC with Differential:    Lab Results   Component Value Date/Time    WBC 16.1 11/04/2022 07:59 PM    RBC 4.26 11/04/2022 07:59 PM    RBC 4.88 10/14/2022 10:09 AM    HGB 12.7 11/04/2022 07:59 PM    HCT 38.3 11/04/2022 07:59 PM     11/04/2022 07:59 PM    MCV 89.9 11/04/2022 07:59 PM    MCH 29.8 11/04/2022 07:59 PM    MCHC 33.2 11/04/2022 07:59 PM    RDW 13.2 11/04/2022 07:59 PM    LYMPHOPCT 11.9 11/04/2022 07:59 PM    LYMPHOPCT 23.3 10/14/2022 10:09 AM    MONOPCT 7.3 11/04/2022 07:59 PM    EOSPCT 2.5 10/14/2022 10:09 AM    BASOPCT 0.2 11/04/2022 07:59 PM    MONOSABS 1.18 11/04/2022 07:59 PM    LYMPHSABS 1.92 11/04/2022 07:59 PM    EOSABS 0.03 11/04/2022 07:59 PM    BASOSABS 0.04 11/04/2022 07:59 PM     CMP:    Lab Results   Component Value Date/Time     11/04/2022 07:59 PM    K 3.5 11/04/2022 07:59 PM    K 4.6 03/03/2018 06:00 AM    CL 97 11/04/2022 07:59 PM    CO2 24 11/04/2022 07:59 PM    BUN 5 11/04/2022 07:59 PM    CREATININE 0.9 11/04/2022 07:59 PM    GFRAA >60 04/11/2022 12:58 PM    LABGLOM >60 11/04/2022 07:59 PM    GLUCOSE 96 11/04/2022 07:59 PM    GLUCOSE 91 10/14/2022 10:09 AM    PROT 7.1 11/04/2022 07:59 PM    LABALBU 3.4 11/04/2022 07:59 PM    CALCIUM 9.0 11/04/2022 07:59 PM    BILITOT 0.6 11/04/2022 07:59 PM    ALKPHOS 85 11/04/2022 07:59 PM    AST 52 11/04/2022 07:59 PM    ALT 52 11/04/2022 07:59 PM     Recent Labs     11/04/22 1959   TROPHS 13*     ASSESSMENT:  Sepsis secondary to pulmonary abscess versus empyema  Acute exacerbation of COPD complicated by ongoing tobacco abuse  Mild dehydration secondary to nausea, poor intake and diarrhea  Impaired fasting glucose  Gastroesophageal reflux disease  Hyperlipidemia  Bipolar Depression and anxiety    PLAN:  Jimmy Beyer is a 69-year-old male patient presented to [address] [place] [address] with general symptoms of illness, fevers and chills. He is identified as having pulmonary abscess versus empyema. Also had associated nausea, poor appetite and diarrhea. His roommate has similar symptoms.   There is concern for an infectious viral process such as COVID and respiratory film array will be assessed. Based upon the appearance on CT imaging for abscess versus empyema, we will asked that both the pulmonary and ID teams to follow. Infectious work-up is being undertaken. Patient is initiated on Merrem and vancomycin in the ER and this will be continued until ID is able to provide their opinion and this is limited by the patient's multiple antibiotic allergies. Respiratory supportive measures and corticosteroids will be utilized as well. Patient would certainly benefit from smoking cessation as detailed below we had a conversation regarding this. We will continue with fluids, symptomatic and supportive care, incentive spirometer and flutter valve. Based upon his heavy smoking history and the concern for underlying malignant process, we will evaluate the abdomen and pelvis with CT scan with IV and oral contrast to exclude intra-abdominal metastasis in the event that this is indeed a malignant process. PT, OT and social work will follow for discharge planning purposes. Underlying co-morbidites will be addressed during hospitalization as well. Labs and vital signs will be monitored closely and addressed accordingly. See additional orders for details. Marissa Garzon was counseled on smoking cessation. Marcin smokes approximately 20-35 cigarettes per day x many years. We have had extensive discussion regarding the need to quit smoking, the negative health implications of smoking overall, as well as the impact on aMrcin's comorbid conditions. Marissa Garzon does voice a willingness to quit smoking and he is in process of actively tapering. We have encouraged him to set a definite quit date. We have discussed potential methods for smoking cessation including nicotine replacement via patch, gum or lozenge, behavioral and lifestyle modifications, and follow-up with primary care provider for consideration of medications for smoking cessation as well.   We have also discussed additional resources such as [facility] site for additional information, quitassist.com, and [facility]uit[facility]. We have discussed the arrangement of follow-up with primary care provider to discuss progress as well as the potential institution of medications if required/desired. The amount of time spent counseling the patient directly regarding smoking cessation is greater than 10 minutes.         CAMILA Han CNP  6:57 AM  11/5/2022    Electronically signed by CAMILA Han CNP on 11/5/22 at 6:57 AM EDT

## 2022-11-05 NOTE — PROGRESS NOTES
Pharmacy Consultation Note  (Antibiotic Dosing and Monitoring)    Initial consult date: 11/5/22  Consulting physician/provider: Aquilla Osgood, APRN-CNP  Drug: Vancomycin  Indication: Pneumonia (CAP)    Age/  Gender Height Weight IBW  Allergy Information   57 y.o./male 6' (182.9 cm) 215 lb (97.5 kg)     Ideal body weight: 77.6 kg (171 lb 1.2 oz)  Adjusted ideal body weight: 85.6 kg (188 lb 10.3 oz)   Clindamycin/lincomycin, Celexa [citalopram hydrobromide], Linzess [linaclotide], Penicillins, and Ciprofloxacin      Renal Function:  Recent Labs     11/04/22 1959   BUN 5*   CREATININE 0.9     No intake or output data in the 24 hours ending 11/05/22 0718    Vancomycin Monitoring:  Trough:  No results for input(s): VANCOTROUGH in the last 72 hours. Random:  No results for input(s): VANCORANDOM in the last 72 hours. No results for input(s): Lelan Salts in the last 72 hours. Historical Cultures:  No results found for: ORG  No results for input(s): BC in the last 72 hours. Vancomycin Administration Times:  Recent vancomycin administrations        No vancomycin IV orders with administrations found. Orders not given:            vancomycin (VANCOCIN) 1,750 mg in dextrose 5 % 500 mL IVPB                    Assessment:  Patient is a 61 y.o. male who has been initiated on vancomycin  Estimated Creatinine Clearance: 108 mL/min (based on SCr of 0.9 mg/dL). To dose vancomycin, pharmacy will be utilizing [employer] calculation software for goal AUC/YONI 400-600 mg/L-hr    Plan:  Vancomycin 1750 mg IV x 1 dose, then vancomycin 1250 mg every 12 hours  Will check vancomycin levels when appropriate  Will continue to monitor renal function   Pharmacy to follow      Boyd Current, TOLEDO JOYCELYN [facility] 11/5/2022 7:18 AM     Pharmacy Consultation Note  (Antibiotic Dosing and Monitoring)     Vancomycin has been discontinued; pharmacy will sign-off. Please reconsult if needed.      Thank you,  Boyd Fajardo PharmD, 39/7/201030:38 PM

## 2022-11-06 LAB
ALBUMIN SERPL-MCNC: 2.9 G/DL (ref 3.5–5.2)
ALP BLD-CCNC: 77 U/L (ref 40–129)
ALT SERPL-CCNC: 43 U/L (ref 0–40)
ANION GAP SERPL CALCULATED.3IONS-SCNC: 11 MMOL/L (ref 7–16)
AST SERPL-CCNC: 32 U/L (ref 0–39)
BASOPHILS ABSOLUTE: 0.01 E9/L (ref 0–0.2)
BASOPHILS RELATIVE PERCENT: 0.1 % (ref 0–2)
BILIRUB SERPL-MCNC: 0.3 MG/DL (ref 0–1.2)
BILIRUBIN DIRECT: <0.2 MG/DL (ref 0–0.3)
BILIRUBIN, INDIRECT: ABNORMAL MG/DL (ref 0–1)
BUN BLDV-MCNC: 15 MG/DL (ref 6–20)
CALCIUM SERPL-MCNC: 9 MG/DL (ref 8.6–10.2)
CHLORIDE BLD-SCNC: 104 MMOL/L (ref 98–107)
CHOLESTEROL, TOTAL: 148 MG/DL (ref 0–199)
CO2: 22 MMOL/L (ref 22–29)
CREAT SERPL-MCNC: 0.6 MG/DL (ref 0.7–1.2)
EOSINOPHILS ABSOLUTE: 0 E9/L (ref 0.05–0.5)
EOSINOPHILS RELATIVE PERCENT: 0 % (ref 0–6)
GFR SERPL CREATININE-BSD FRML MDRD: >60 ML/MIN/1.73
GLUCOSE BLD-MCNC: 153 MG/DL (ref 74–99)
HBA1C MFR BLD: 5.7 % (ref 4–5.6)
HCT VFR BLD CALC: 37 % (ref 37–54)
HDLC SERPL-MCNC: 20 MG/DL
HEMOGLOBIN: 12.1 G/DL (ref 12.5–16.5)
IMMATURE GRANULOCYTES #: 0.12 E9/L
IMMATURE GRANULOCYTES %: 1 % (ref 0–5)
L. PNEUMOPHILA SEROGP 1 UR AG: NORMAL
LDL CHOLESTEROL CALCULATED: 113 MG/DL (ref 0–99)
LYMPHOCYTES ABSOLUTE: 0.89 E9/L (ref 1.5–4)
LYMPHOCYTES RELATIVE PERCENT: 7.5 % (ref 20–42)
MAGNESIUM: 2.2 MG/DL (ref 1.6–2.6)
MCH RBC QN AUTO: 29.5 PG (ref 26–35)
MCHC RBC AUTO-ENTMCNC: 32.7 % (ref 32–34.5)
MCV RBC AUTO: 90.2 FL (ref 80–99.9)
MONOCYTES ABSOLUTE: 0.34 E9/L (ref 0.1–0.95)
MONOCYTES RELATIVE PERCENT: 2.8 % (ref 2–12)
NEUTROPHILS ABSOLUTE: 10.57 E9/L (ref 1.8–7.3)
NEUTROPHILS RELATIVE PERCENT: 88.6 % (ref 43–80)
PDW BLD-RTO: 13.1 FL (ref 11.5–15)
PHOSPHORUS: 3.6 MG/DL (ref 2.5–4.5)
PLATELET # BLD: 593 E9/L (ref 130–450)
PMV BLD AUTO: 8.7 FL (ref 7–12)
POTASSIUM SERPL-SCNC: 4.2 MMOL/L (ref 3.5–5)
RBC # BLD: 4.1 E12/L (ref 3.8–5.8)
REASON FOR REJECTION: NORMAL
REJECTED TEST: NORMAL
SODIUM BLD-SCNC: 137 MMOL/L (ref 132–146)
STREP PNEUMONIAE ANTIGEN, URINE: NORMAL
T4 FREE: 1.17 NG/DL (ref 0.93–1.7)
TOTAL PROTEIN: 6.5 G/DL (ref 6.4–8.3)
TRIGL SERPL-MCNC: 75 MG/DL (ref 0–149)
TROPONIN, HIGH SENSITIVITY: <6 NG/L (ref 0–11)
TSH SERPL DL<=0.05 MIU/L-ACNC: 0.73 UIU/ML (ref 0.27–4.2)
VLDLC SERPL CALC-MCNC: 15 MG/DL
WBC # BLD: 11.9 E9/L (ref 4.5–11.5)

## 2022-11-06 PROCEDURE — 80048 BASIC METABOLIC PNL TOTAL CA: CPT

## 2022-11-06 PROCEDURE — 6360000002 HC RX W HCPCS: Performed by: NURSE PRACTITIONER

## 2022-11-06 PROCEDURE — 97161 PT EVAL LOW COMPLEX 20 MIN: CPT | Performed by: PHYSICAL THERAPIST

## 2022-11-06 PROCEDURE — 85025 COMPLETE CBC W/AUTO DIFF WBC: CPT

## 2022-11-06 PROCEDURE — 87070 CULTURE OTHR SPECIMN AEROBIC: CPT

## 2022-11-06 PROCEDURE — 2580000003 HC RX 258: Performed by: SPECIALIST

## 2022-11-06 PROCEDURE — 94640 AIRWAY INHALATION TREATMENT: CPT

## 2022-11-06 PROCEDURE — 84100 ASSAY OF PHOSPHORUS: CPT

## 2022-11-06 PROCEDURE — 94669 MECHANICAL CHEST WALL OSCILL: CPT

## 2022-11-06 PROCEDURE — 1200000000 HC SEMI PRIVATE

## 2022-11-06 PROCEDURE — 6360000002 HC RX W HCPCS: Performed by: SPECIALIST

## 2022-11-06 PROCEDURE — 83735 ASSAY OF MAGNESIUM: CPT

## 2022-11-06 PROCEDURE — 84443 ASSAY THYROID STIM HORMONE: CPT

## 2022-11-06 PROCEDURE — 80061 LIPID PANEL: CPT

## 2022-11-06 PROCEDURE — 6360000002 HC RX W HCPCS: Performed by: INTERNAL MEDICINE

## 2022-11-06 PROCEDURE — 83036 HEMOGLOBIN GLYCOSYLATED A1C: CPT

## 2022-11-06 PROCEDURE — 80076 HEPATIC FUNCTION PANEL: CPT

## 2022-11-06 PROCEDURE — 36415 COLL VENOUS BLD VENIPUNCTURE: CPT

## 2022-11-06 PROCEDURE — 84439 ASSAY OF FREE THYROXINE: CPT

## 2022-11-06 PROCEDURE — 87206 SMEAR FLUORESCENT/ACID STAI: CPT

## 2022-11-06 PROCEDURE — 6370000000 HC RX 637 (ALT 250 FOR IP): Performed by: NURSE PRACTITIONER

## 2022-11-06 PROCEDURE — 84484 ASSAY OF TROPONIN QUANT: CPT

## 2022-11-06 RX ORDER — METHYLPREDNISOLONE SODIUM SUCCINATE 40 MG/ML
40 INJECTION, POWDER, LYOPHILIZED, FOR SOLUTION INTRAMUSCULAR; INTRAVENOUS EVERY 12 HOURS
Status: DISCONTINUED | OUTPATIENT
Start: 2022-11-06 | End: 2022-11-07

## 2022-11-06 RX ORDER — ENOXAPARIN SODIUM 100 MG/ML
40 INJECTION SUBCUTANEOUS DAILY
Status: DISCONTINUED | OUTPATIENT
Start: 2022-11-06 | End: 2022-11-09 | Stop reason: HOSPADM

## 2022-11-06 RX ADMIN — ENOXAPARIN SODIUM 40 MG: 100 INJECTION SUBCUTANEOUS at 17:47

## 2022-11-06 RX ADMIN — Medication 2000 UNITS: at 09:21

## 2022-11-06 RX ADMIN — BUDESONIDE 500 MCG: 0.5 SUSPENSION RESPIRATORY (INHALATION) at 04:08

## 2022-11-06 RX ADMIN — METHYLPREDNISOLONE SODIUM SUCCINATE 40 MG: 40 INJECTION, POWDER, FOR SOLUTION INTRAMUSCULAR; INTRAVENOUS at 12:20

## 2022-11-06 RX ADMIN — ACETYLCYSTEINE 600 MG: 200 SOLUTION ORAL; RESPIRATORY (INHALATION) at 04:08

## 2022-11-06 RX ADMIN — FOLIC ACID 1 MG: 1 TABLET ORAL at 09:21

## 2022-11-06 RX ADMIN — CEFTRIAXONE 2000 MG: 2 INJECTION, POWDER, FOR SOLUTION INTRAMUSCULAR; INTRAVENOUS at 13:02

## 2022-11-06 RX ADMIN — SODIUM CHLORIDE 50 MG: 9 INJECTION, SOLUTION INTRAVENOUS at 17:47

## 2022-11-06 RX ADMIN — METHYLPREDNISOLONE SODIUM SUCCINATE 40 MG: 40 INJECTION, POWDER, FOR SOLUTION INTRAMUSCULAR; INTRAVENOUS at 22:27

## 2022-11-06 RX ADMIN — ARFORMOTEROL TARTRATE 15 MCG: 15 SOLUTION RESPIRATORY (INHALATION) at 19:25

## 2022-11-06 RX ADMIN — ARFORMOTEROL TARTRATE 15 MCG: 15 SOLUTION RESPIRATORY (INHALATION) at 04:08

## 2022-11-06 RX ADMIN — BUDESONIDE 500 MCG: 0.5 SUSPENSION RESPIRATORY (INHALATION) at 19:25

## 2022-11-06 RX ADMIN — SODIUM CHLORIDE 50 MG: 9 INJECTION, SOLUTION INTRAVENOUS at 06:22

## 2022-11-06 RX ADMIN — ATORVASTATIN CALCIUM 20 MG: 20 TABLET, FILM COATED ORAL at 09:21

## 2022-11-06 RX ADMIN — ACETYLCYSTEINE 600 MG: 200 SOLUTION ORAL; RESPIRATORY (INHALATION) at 19:26

## 2022-11-06 NOTE — CONSULTS
Assessment:  Sepsis without septic shock  Exacerbation of COPD  Community acquired pneumonia  History of COPD  Tobacco abuse      Plan:   Supplemental oxygen to maintain SPO2 greater than 90%  Continue Brovana and Pulmicort aerosol treatments  Start Mucomyst with chest vest every 8 hours  Continue Solu-Medrol  Antibiotic therapy per infectious disease recommendations  Strep pneumonia and Legionella pending  Check respiratory culture  Respiratory molecular panel negative  Blood cultures pending  Smoking cessation offered  The patient will need follow up with pulmonary to ensure resolution of infiltrates  The patient will need exercise oximetry prior to discharge  Bronchodilators for discharge: LABA/ICS + LAMA (Symbicort/advair + Spiriva or Trelegy Ellipta)      History of Present Illness:   Patient is 14-year-old male with a past medical history of COPD, depression, and hyperlipidemia. He presented to the emergency room after reporting fevers and chills over the last 2 weeks that is progressively becoming worse. He is unable to state what his temperature was because he does not have a thermometer. He also reports that he has not been eating very well due to his general malaise associated with his fever he was also reported multiple episodes of diarrhea. His did have a fever in the emergency department of 102. 1. He also reports having a moist productive cough with tan-colored mucus. CT of his chest showed 8.6 cm infiltrate in the superior segment of the left lower lobe associated pleural thickening up to 13 mm. November 5, 2022:  Patient is seen examined at the bedside in emergency department is alert and oriented and currently requiring no oxygen and denies any shortness of breath. His main concern is his persistent fevers for the last 2 weeks. Will be started on Mucomyst every 8 hours along with associated chest vest.  He says incentive spirometer and Pep flutter valve is encouraged.   We will continue his Brovana and Pulmicort aerosol treatments along with IV Solu-Medrol. And continue to monitor. Is recommended that he will have a follow-up CAT scan in 6 months after discharge. Patient states he smokes 1 to 2 packs of cigarettes a day for multiple years. Patient has been educated on the importance of stop smoking. Nicotine patch has been offered and the patient feels that he does not need it at this point in time.     Past Medical History:  Past Medical History:   Diagnosis Date    COPD (chronic obstructive pulmonary disease) ([facility] [place] [address]     Depression     Hyperlipidemia       Past Surgical History:   Procedure Laterality Date    CHOLECYSTECTOMY, LAPAROSCOPIC  7-6-15       Family History:   @[facility]@    Allergies:         Clindamycin/lincomycin, Celexa [citalopram hydrobromide], Linzess [linaclotide], Penicillins, and Ciprofloxacin    Social history:  Social History     Socioeconomic History    Marital status:      Spouse name: Not on file    Number of children: Not on file    Years of education: Not on file    Highest education level: Not on file   Occupational History    Not on file   Tobacco Use    Smoking status: Every Day     Packs/day: 1.00     Years: 42.00     Pack years: 42.00     Types: Cigarettes    Smokeless tobacco: Never   Vaping Use    Vaping Use: Never used   Substance and Sexual Activity    Alcohol use: No     Alcohol/week: 0.0 standard drinks    Drug use: Yes     Types: Marijuana (Weed)     Comment: occasional last used 2/12/18    Sexual activity: Not Currently   Other Topics Concern    Not on file   Social History Narrative    Not on file     Social Determinants of Health     Financial Resource Strain: Low Risk     Difficulty of Paying Living Expenses: Not hard at all   Food Insecurity: No Food Insecurity    Worried About Running Out of Food in the Last Year: Never true    Ran Out of Food in the Last Year: Never true   Transportation Needs: Not on file   Physical Activity: Not on file Stress: Not on file   Social Connections: Not on file   Intimate Partner Violence: Not on file   Housing Stability: Not on file       Current Medications:     Current Facility-Administered Medications:     atorvastatin (LIPITOR) tablet 20 mg, 20 mg, Oral, Daily, [place] Osgood, APRN - CNP, 20 mg at 11/05/22 1698    Vitamin D (CHOLECALCIFEROL) tablet 2,000 Units, 2,000 Units, Oral, Daily, [place] Osgood, APRN - CNP, 2,000 Units at 56/01/96 6785    folic acid (FOLVITE) tablet 1 mg, 1 mg, Oral, Daily, [place] Osgood, APRN - CNP, 1 mg at 11/05/22 0955    traZODone (DESYREL) tablet 100 mg, 100 mg, Oral, Nightly, [place] Osgood, APRN - CNP    albuterol (PROVENTIL) nebulizer solution 2.5 mg, 2.5 mg, Nebulization, Q4H PRN, [place] Osgood, APRN - CNP, 2.5 mg at 11/05/22 0636    budesonide (PULMICORT) nebulizer suspension 500 mcg, 0.5 mg, Nebulization, BID, [place] Osgood, APRN - CNP, 500 mcg at 11/05/22 1907    hydrALAZINE (APRESOLINE) injection 5 mg, 5 mg, IntraVENous, Q4H PRN, [place] Osgood, APRN - CNP    magnesium sulfate 1000 mg in dextrose 5% 100 mL IVPB, 1,000 mg, IntraVENous, PRN, [place] Osgood, APRN - CNP    sodium phosphate 15.6 mmol in sodium chloride 0.9 % 250 mL IVPB, 0.16 mmol/kg, IntraVENous, PRN **OR** sodium phosphate 31.2 mmol in sodium chloride 0.9 % 500 mL IVPB, 0.32 mmol/kg, IntraVENous, PRN, [place] Osgood, APRN - CNP    potassium chloride (KLOR-CON M) extended release tablet 40 mEq, 40 mEq, Oral, PRN **OR** potassium bicarb-citric acid (EFFER-K) effervescent tablet 40 mEq, 40 mEq, Oral, PRN **OR** potassium chloride 10 mEq/100 mL IVPB (Peripheral Line), 10 mEq, IntraVENous, PRN, [place] Osgood, APRN - CNP    methylPREDNISolone sodium (SOLU-MEDROL) injection 40 mg, 40 mg, IntraVENous, Q8H, [place] Osgood, APRN - CNP, 40 mg at 11/05/22 1437    Arformoterol Tartrate (BROVANA) nebulizer solution 15 mcg, 15 mcg, Nebulization, BID, [place] Osgood, APRN - CNP, 15 mcg at 11/05/22 1907    acetaminophen (TYLENOL) tablet 650 mg, 650 mg, Oral, Q6H PRN, Traceyjayda Gonzales, DO, 650 mg at 11/05/22 0723    [COMPLETED] tigecycline (TYGACIL) 100 mg in sodium chloride 0.9 % 100 mL IVPB, 100 mg, IntraVENous, Once, Stopped at 11/05/22 1637 **FOLLOWED BY** [START ON 11/6/2022] tigecycline (TYGACIL) 50 mg in sodium chloride 0.9 % 100 mL IVPB, 50 mg, IntraVENous, Q12H, Sammie Meyer MD    [START ON 11/6/2022] cefTRIAXone (ROCEPHIN) 2,000 mg in sterile water 20 mL IV syringe, 2,000 mg, IntraVENous, Q24H, Sammie Meyer MD    acetylcysteine (MUCOMYST) 20 % solution 600 mg, 600 mg, Inhalation, TID, Keara Flores MD    Current Outpatient Medications:     nicotine (NICODERM CQ) 7 MG/24HR, Place 1 patch onto the skin every 24 hours for 14 days Begin after completing 14mg patch x 2 weeks. (Patient not taking: Reported on 11/4/2022), Disp: 14 patch, Rfl: 0    nicotine (NICODERM CQ) 14 MG/24HR, Place 1 patch onto the skin every 24 hours for 14 days Begin after completing 21mg patch x 6 weeks.  (Patient not taking: Reported on 11/4/2022), Disp: 14 patch, Rfl: 0    nicotine (NICODERM CQ) 21 MG/24HR, Place 1 patch onto the skin every 24 hours (Patient not taking: Reported on 11/4/2022), Disp: 42 patch, Rfl: 0    fluticasone-umeclidin-vilant (Loras Fanning) 100-62.5-25 MCG/INH AEPB, INHALE 1 PUFF INTO THE LUNGS DAILY, Disp: 1 each, Rfl: 1    folic acid (FOLVITE) 1 MG tablet, Take 1 tablet by mouth daily, Disp: 90 tablet, Rfl: 1    pantoprazole (PROTONIX) 40 MG tablet, TAKE ONE TABLET BY MOUTH EVERY DAY WITH BREAKFAST, Disp: 90 tablet, Rfl: 0    atorvastatin (LIPITOR) 20 MG tablet, Take 1 tablet by mouth daily, Disp: 90 tablet, Rfl: 1    albuterol sulfate HFA (PROAIR HFA) 108 (90 Base) MCG/ACT inhaler, INHALE TWO PUFFS INTO THE LUNGS EVERY 6 HOURS AS NEEDED FOR SHORTNESS OF BREATH, Disp: 8.5 g, Rfl: 2    Cholecalciferol (VITAMIN D3) 2000 units CAPS, TAKE ONE CAPSULE BY MOUTH EVERY DAY, Disp: 30 capsule, Rfl: 5    traZODone (DESYREL) 100 MG tablet, Take 100 mg by mouth nightly Takes 2 tabs nightly as needed, Disp: , Rfl:     Review of Systems:   General: denies weight gain, denies loss of appetite, night sweats. Reports fever and chills for the last 2 weeks  HEENT: denies headaches, dizziness, head trauma, visual changes,  Respiratory: denies chest pain, dyspnea, reports moist cough with tan-colored mucus  Cardiovascular: denies orthopnea, paroxysmal nocturnal dyspnea, leg swelling, and previous heart attack. Gastrointestinal: denies pain, nausea vomiting, diarrhea, constipation, melena or bleeding. Genitourinary: denies hematuria, frequency, urgency or dysuria  Neurology: denies syncope, seizures, paralysis, paraesthesia   Endocrine: denies polyuria, polydipsia, skin or hair changes, and heat or cold intolerance  Musculoskeletal: denies joint pain, swelling, arthritis or myalgia  Hematologic: denies bleeding, adenopathy and easy bruising  Skin: denies rashes and skin discoloration  Psychiatry: denies depression    Physical Exam:   Vital Signs:  /71   Pulse 89   Temp 98.1 °F (36.7 °C) (Oral)   Resp 26   Ht 6' (1.829 m)   Wt 215 lb (97.5 kg)   SpO2 95%   BMI 29.16 kg/m²     Input/Output:  No intake/output data recorded. Oxygen requirements: Room air         General appearance: Well developed, not in pain or distress, in no respiratory distress    HEENT: Atraumatic/normocephalic, EOMI, SEBASTIAN,  moist mucosa,   Neck: Supple, no jugular venous distension, lymphadenopathy, thyromegaly  Chest: Left base diminished posteriorly, no wheezing, no crackles   Cardiovascular: Normal S1 , S2, regular rate and rhythm, no murmur, rub or gallop  Abdomen: Normal sounds present, soft, lax with no tenderness, no hepatosplenomegaly, and no masses  Extremities: No edema. Pulses are equally present.    Skin: intact, no rashes   Neurologic: Alert and oriented x 3, No focal deficit     Investigations:  Labs, radiological imaging and cardiac work up were reviewed          Electronically signed by CAMILA Espinosa CNP on 11/5/2022 at 8:11 PM      ATTESTATION:  ICU Staff Physician note of personal involvement in Care  As the attending physician, I certify that I personally reviewed the patients history and personnally examined the patient to confirm the physical findings described above,  And that I reviewed the relevant imaging studies and available reports. I also discussed the differential diagnosis and all of the proposed management plans with the patient and individuals accompanying the patient to this visit. They had the opportunity to ask questions about the proposed management plans and to have those questions answered. I discussed the patient's assessment and plan with Harshil COLLIER.        Tommy Crow MD  Pulmonary and Critical Care Medicine

## 2022-11-06 NOTE — PROGRESS NOTES
Internal Medicine Progress Note    [facility]    Iris Romo. Baudilio Torres., F.A.C.OZoeI. Sergey Stein D.O., NEEMA Wellington, MSN, APRN, NP-C  [facility], MSN, APRN-CNP     Primary Care Physician: Mil Conrad DO   Admitting Physician:  Herson Alberto DO  Admission date and time: 11/4/2022  6:25 PM    Room:  12/12  Admitting diagnosis: Abscess of lower lobe of left lung with pneumonia [facility]) [J85.1]    Patient Name: Miguel Alvarado  MRN: [id number]    Date of Service: 11/6/2022     Subjective:  Farida Bell is a 61 y.o. male who was seen and examined today,11/6/2022, at the bedside. He is feeling much better overall. No longer having subjective fevers and chills. We have reviewed the results of the work-up thus far. We have discussed his problematic intermittent use of cocaine, its likely role in his current illness and the potential for this to cause significant comorbidities or possibly even death. Additionally, he remains boarded in the emergency department for the second consecutive day and we discussed trying to assure that all potential treatments are continued without disruption despite this. No family present during my examination. Review of System:   Constitutional:   Denies any further fever or chills, denies weight loss or gain, fatigue or malaise. HEENT:   Denies ear pain, sore throat, sinus or eye problems. Cardiovascular:   Denies any chest pain, irregular heartbeats, or palpitations. Respiratory:   No resting dyspnea. Exertional dyspnea is improving. Coughing at times with sputum production which he describes as sticky and dark in color. Gastrointestinal:   Denies nausea, vomiting, diarrhea, or constipation. Denies any abdominal pain. Genitourinary:    Denies any urgency, frequency, hematuria. Voiding  without difficulty. Extremities:   Denies lower extremity swelling, edema or cyanosis.    Neurology: Denies any headache or focal neurological deficits, positive for improving generalized weakness and fatigue without focal complaint. Psch:   Denies being anxious or depressed. Musculoskeletal:    Denies  myalgias, joint complaints or back pain. Integumentary:   Denies any rashes, ulcers, or excoriations. Denies bruising. Hematologic/Lymphatic:  Denies bruising or bleeding. Physical Exam:  No intake/output data recorded. No intake or output data in the 24 hours ending 11/06/22 0536No intake/output data recorded. Patient Vitals for the past 96 hrs (Last 3 readings):   Weight   11/05/22 0018 215 lb (97.5 kg)     Vital Signs:   Blood pressure 120/77, pulse 69, temperature 98.1 °F (36.7 °C), temperature source Oral, resp. rate 14, height 6' (1.829 m), weight 215 lb (97.5 kg), SpO2 94 %. General appearance:  Alert, responsive, oriented to person, place, and time. Less ill-appearing, no distress. Head:  Normocephalic. No masses, lesions or tenderness. Eyes:  PERRLA. EOMI. Sclera clear. ENT:  Ears normal. Mucosa normal.  Neck:    Supple. Trachea midline. No thyromegaly. No JVD. No bruits. Heart:    Rhythm regular. Rate controlled. S1 and S2.  Lungs:    Symmetrical.  Air exchange is improved and now mildly diminished. Otherwise lungs are clear to auscultation bilaterally. No wheezes. No rhonchi. No rales. Abdomen:   Soft. Non-tender. Non-distended. Bowel sounds positive. No organomegaly or masses. No pain on palpation. Extremities:    Peripheral pulses present. No peripheral edema. No ulcers. No cyanosis. No clubbing. Neurologic:    Alert x 3. No focal deficit. Cranial nerves grossly intact. No focal weakness. Psych:   Behavior is normal. Mood appears normal. Speech is not rapid and/or pressured. Musculoskeletal:   No unilateral joint edema, erythema or warmth. Gait not assessed.   Integumentary:  No rashes  Skin normal color and texture.   Genitalia/Breast:  Deferred    Medication:  Scheduled Meds:   methylPREDNISolone  40 mg IntraVENous Q12H    atorvastatin  20 mg Oral Daily    Vitamin D  2,000 Units Oral Daily    folic acid  1 mg Oral Daily    traZODone  100 mg Oral Nightly    budesonide  0.5 mg Nebulization BID    Arformoterol Tartrate  15 mcg Nebulization BID    tigecycline (TYGACIL) IVPB  50 mg IntraVENous Q12H    cefTRIAXone (ROCEPHIN) IV  2,000 mg IntraVENous Q24H    acetylcysteine  600 mg Inhalation TID     Objective Data:  CBC with Differential:    Lab Results   Component Value Date/Time    WBC 16.1 11/04/2022 07:59 PM    RBC 4.26 11/04/2022 07:59 PM    RBC 4.88 10/14/2022 10:09 AM    HGB 12.7 11/04/2022 07:59 PM    HCT 38.3 11/04/2022 07:59 PM     11/04/2022 07:59 PM    MCV 89.9 11/04/2022 07:59 PM    MCH 29.8 11/04/2022 07:59 PM    MCHC 33.2 11/04/2022 07:59 PM    RDW 13.2 11/04/2022 07:59 PM    LYMPHOPCT 11.9 11/04/2022 07:59 PM    LYMPHOPCT 23.3 10/14/2022 10:09 AM    MONOPCT 7.3 11/04/2022 07:59 PM    EOSPCT 2.5 10/14/2022 10:09 AM    BASOPCT 0.2 11/04/2022 07:59 PM    MONOSABS 1.18 11/04/2022 07:59 PM    LYMPHSABS 1.92 11/04/2022 07:59 PM    EOSABS 0.03 11/04/2022 07:59 PM    BASOSABS 0.04 11/04/2022 07:59 PM     BMP:    Lab Results   Component Value Date/Time     11/04/2022 07:59 PM    K 3.5 11/04/2022 07:59 PM    K 4.6 03/03/2018 06:00 AM    CL 97 11/04/2022 07:59 PM    CO2 24 11/04/2022 07:59 PM    BUN 5 11/04/2022 07:59 PM    LABALBU 3.4 11/04/2022 07:59 PM    CREATININE 0.9 11/04/2022 07:59 PM    CALCIUM 9.0 11/04/2022 07:59 PM    GFRAA >60 04/11/2022 12:58 PM    LABGLOM >60 11/04/2022 07:59 PM    GLUCOSE 96 11/04/2022 07:59 PM    GLUCOSE 91 10/14/2022 10:09 AM       Wound Documentation:   Incision 07/06/15 Abdomen (Active)   Number of days: 2679       Assessment:  Sepsis secondary to pulmonary abscess versus empyema with strong clinical suspicion for aspiration  Acute exacerbation of COPD complicated by ongoing tobacco abuse  Mild dehydration secondary to nausea, poor intake and diarrhea  Impaired fasting glucose  Gastroesophageal reflux disease  Hyperlipidemia  Bipolar Depression and anxiety  Intermittent insufflation of cocaine likely contributing to aspiration    Plan:   Marisol Ryan is doing much better overall. He has been seen by the ID and pulmonary teams and the recommendations have been noted. Antibiotics have been adjusted by the ID team and the patient is on Tygacil and Rocephin at present. Infectious work-up is being undertaken and further antibiotic adjustments as per the ID team.  Primary team has added mucolytic's, chest vest and the patient is expectorating well. He is exhibiting less COPD/bronchospasm like findings on examination we will scale steroids back to twice daily. Continue with respiratory supportive measures. Flutter valve and incentive spirometer as well as increased activity. Unfortunately, his activity is very limited as he has been boarded in the emergency department since admission. We continue to await bed placement. We have discussed cocaine use, its potential for significant comorbidities, worsening condition or possibly even death associated with cocaine use and intoxication. He states that he will never use this again. We discussed smoking cessation as detailed yesterday. His appetite and intake are improving, we will advance his diet and we will discontinue IV fluids. His chronic morbidities, lab values and vital signs are being monitored and addressed accordingly. Discharge planning is underway and timeframe will be entirely dependent upon final cultures, final antibiotic recommendations and will discuss the case today again with the subspecialist.  Continue current therapy. See orders for further plan of care.     Due to extremely high hospital inpatient census and bed limitations, along with staff shortages, we have had a natividad discussion with the patient/family regarding the likelihood of prolonged ER boarding status and potential delays/disruptions in care related to this. They understand and agree to maintain hospitalization at this facility while accepting these risks. We have made every attempt to coordinate care with the ER nursing staff as well to avoid any disruptions in care. More than 50% of my  time was spent at the bedside counseling/coordinating care with the patient and/or family with face to face contact. This time was spent reviewing notes and laboratory data as well as instructing and counseling the patient. Time I spent with the family or surrogate(s) is included only if the patient was incapable of providing the necessary information or participating in medical decisions. I also discussed the differential diagnosis and all of the proposed management plans with the patient and individuals accompanying the patient. Farida Bell requires this high level of physician care and nursing on the IMC/Telemetry unit due the complexity of decision management and chance of rapid decline or death. Continued cardiac monitoring and higher level of nursing are required. I am readily available for any further decision-making and intervention.      CAMILA Calderon CNP  11/6/2022  5:36 AM

## 2022-11-06 NOTE — PROGRESS NOTES
improve: base of support, weight shift, weight bearing    -Endurance: Utilize Supervised activities to increase level of endurance to allow for safe functional mobility including transfers and gait     PT long term treatment goals are located in below grid    Patient and or family understand(s) diagnosis, prognosis, and plan of care. Frequency of treatments: Patient will be seen  daily. Prior Level of Function: Patient ambulated independently    Rehab Potential: good   for baseline    Past medical history:   Past Medical History:   Diagnosis Date    COPD (chronic obstructive pulmonary disease) ([facility] [address]     Depression     Hyperlipidemia      Past Surgical History:   Procedure Laterality Date    CHOLECYSTECTOMY, LAPAROSCOPIC  7-6-15       SUBJECTIVE:    Precautions: Check Pulse Oximetry while ambulating  and up in chair , falls and alarm ,      Social history: Patient lives with roommate  in a ranch home  with No steps  to enter   Tub shower grab bars    Equipment owned: [address] [facility] [place],       [address] [facility]   How much difficulty turning over in bed?: None  How much difficulty sitting down on / standing up from a chair with arms?: None  How much difficulty moving from lying on back to sitting on side of bed?: None  How much help from another person moving to and from a bed to a chair?: None  How much help from another person needed to walk in hospital room?: A Little  How much help from another person for climbing 3-5 steps with a railing?: A Little  AM-PAC Inpatient Mobility Raw Score : 22  AM-PAC Inpatient T-Scale Score : 53.28  Mobility Inpatient CMS 0-100% Score: 20.91  Mobility Inpatient CMS G-Code Modifier : [address] cleared patient for PT evaluation. The admitting diagnosis and active problem list as listed above have been reviewed prior to the initiation of this evaluation.     OBJECTIVE;   Initial Evaluation  Date: 11/6/2022 Treatment Date:     Short Term/ Long Term Goals   Was pt agreeable to Eval/treatment? Yes  To be met in 3 days   Pain level   0/10        Bed Mobility    Rolling: Independent    Supine to sit: Independent    Sit to supine: Independent    Scooting: Independent        Transfers Sit to stand: Independent        Ambulation     100 feet using  no device with Supervision    cues for safety and pacing    200 feet using  no device with Independent    ROM Within functional limits        Strength BUE:   4/5  RLE:  4/5  LLE:  4/5  Increase strength in affected mm groups by 1/3 grade   Balance Sitting EOB:  good    Dynamic Standing:  good minus  Sitting EOB:  good    Dynamic Standing: good       Patient is Alert & Oriented x person, place, time, and situation and follows directions    Sensation:  Patient  denies numbness/tingling   Edema:  no   Endurance: fair       Vitals: room air   Blood Pressure at rest 103/63 Blood Pressure during session 117/83 seated 111/82 standing   Heart Rate at rest  77 Heart Rate during session 88 seated 94 standing   SPO2 at rest 97%  SPO2 during session 96%     Patient education  Patient educated on role of Physical Therapy, risks of immobility, safety and plan of care,  importance of mobility while in hospital , and safety      Patient response to education:   Pt verbalized understanding Pt demonstrated skill Pt requires further education in this area   Yes Partial Yes      Treatment:  Patient practiced and was instructed/facilitated in the following treatment: Patient   Sat edge of bed 5 minutes with Independent to increase dynamic sitting balance and activity tolerance. Therapeutic Exercises:  not performed       At end of session, patient in chair with  call light and phone within reach,  all lines and tubes intact, nursing notified. Patient would benefit from continued skilled Physical Therapy to improve functional independence and quality of life.          Patient's/ family goals   home    Time in  200  Time out 1016    Total Treatment Time  2 minutes    Evaluation time includes thorough review of current medical information, gathering information on past medical history/social history and prior level of function, completion of standardized testing/informal observation of tasks, assessment of data, and development of Plan of care and goals.      CPT codes:  Low Complexity PT evaluation (90559)  No treatment billed    Baldemar Escobar PT

## 2022-11-06 NOTE — PROGRESS NOTES
Jannette Frazier  MR:  [id number]  :   1965  Admit Date:  2022      This is a face to face encounter with Marcin Perdue 61 y.o. male on 22  Elements of this note, including Diagnosis,  Interval History, Past Medical/Surgical/Family/Social Histories, ROS, physical exam, and Assessment and Plan were copied and pasted from Previous. Updates have been made where noted and reflect current exam and medical decision making from the DOS of this encounter. CHIEF COMPLAINT     ID following for   Chief Complaint   Patient presents with    Fever     CHILLS/ SICK AFTER FLU SHOT/ HAD FEVER ON OCT 25/ BURNING UP THIS AM BUT DID NOT TAKE TEMP/ HAD APPT WITH  BUT TO SICK     HISTORY OF PRESENT ILLNESS   The patient is a 61 y.o. man not known to the Infectious Diseases service. The patient is admitted through the emergency room with fevers chills shortness of breath with productive cough. Patient has generalized malaise as well as diffuse myalgias and headaches. In the emergency room his white count was 16,000 hemoglobin 12.7 with LFTs of 52 and C-reactive protein is 13.4. His BUN and creatinine was 5 and 0.9. His albumin was only 3.4 and his respiratory viral panel was nondiagnostic urine for Legionella and strep pneumo are pending respiratory culture was sent. Empirically he was started on Merrem, Vibramycin, Solu-Medrol, vancomycin. He claims to have allergies or reactions with a rash to quinolones, hives with penicillin and shortness of breath with clindamycin. Patient recently got a flu shot back 2 weeks prior to this admission. The patient admits to taking trazodone and Benadryl to help him sleep.   He also admitted to snort cocaine intermittently last snorting session was in October      Assessment & Plan   Abscess of lower lobe of left lung with pneumonia ([facility] [J85.1]  -left lung  -aspiration  Fevers  Leukocytosis  transaminitis  -blood cx ngtd  -urine Legionella/S pneumonia neg  -resp viral panel neg   -resp cx pending     Allergies -pcn/cipro/clinda    methylPREDNISolone sodium (SOLU-MEDROL) injection 40 mg, Q12H  tigecycline (TYGACIL) 50 mg in sodium chloride 0.9 % 100 mL IVPB, Q12H  cefTRIAXone (ROCEPHIN) 2,000 mg in sterile water 20 mL IV syringe, Q24H         Pt seen and examined  DOS  11/06/22  In bed on RA  Has no c/o f/c/n/v/d  Has productive cough   Has a cat     Patient is tolerating medications. No reported adverse drug reactions. REVIEW OF SYSTEMS     As stated above in the chief complaint, otherwise negative.   CURRENT MEDICATIONS     Current Facility-Administered Medications:     methylPREDNISolone sodium (SOLU-MEDROL) injection 40 mg, 40 mg, IntraVENous, Q12H, Ana Adames APRN - CNP    atorvastatin (LIPITOR) tablet 20 mg, 20 mg, Oral, Daily, Ana Rosangela, APRN - CNP, 20 mg at 11/05/22 1662    Vitamin D (CHOLECALCIFEROL) tablet 2,000 Units, 2,000 Units, Oral, Daily, Ana Adames APRN - CNP, 2,000 Units at 87/01/96 2957    folic acid (FOLVITE) tablet 1 mg, 1 mg, Oral, Daily, Analynn Adames APRN - CNP, 1 mg at 11/05/22 0955    traZODone (DESYREL) tablet 100 mg, 100 mg, Oral, Nightly, Ana Rosangela, APRN - CNP, 100 mg at 11/05/22 2319    albuterol (PROVENTIL) nebulizer solution 2.5 mg, 2.5 mg, Nebulization, Q4H PRN, Ana Adames APRN - CNP, 2.5 mg at 11/05/22 0636    budesonide (PULMICORT) nebulizer suspension 500 mcg, 0.5 mg, Nebulization, BID, Analynn Adames APRN - CNP, 500 mcg at 11/06/22 0408    hydrALAZINE (APRESOLINE) injection 5 mg, 5 mg, IntraVENous, Q4H PRN, Ana Adames APRN - CNP    magnesium sulfate 1000 mg in dextrose 5% 100 mL IVPB, 1,000 mg, IntraVENous, PRN, Ana Adames APRN - CNP    sodium phosphate 15.6 mmol in sodium chloride 0.9 % 250 mL IVPB, 0.16 mmol/kg, IntraVENous, PRN **OR** sodium phosphate 31.2 mmol in sodium chloride 0.9 % 500 mL IVPB, 0.32 mmol/kg, IntraVENous, PRN, Ana Adames, APRN - CNP    potassium chloride (KLOR-CON M) extended release tablet 40 mEq, 40 mEq, Oral, PRN **OR** potassium bicarb-citric acid (EFFER-K) effervescent tablet 40 mEq, 40 mEq, Oral, PRN **OR** potassium chloride 10 mEq/100 mL IVPB (Peripheral Line), 10 mEq, IntraVENous, PRN, Jason Snipe, APRN - CNP    Arformoterol Tartrate (BROVANA) nebulizer solution 15 mcg, 15 mcg, Nebulization, BID, Jason Snipe, APRN - CNP, 15 mcg at 11/06/22 0408    acetaminophen (TYLENOL) tablet 650 mg, 650 mg, Oral, Q6H PRN, Mabelene Sandifer, DO, 650 mg at 11/05/22 0723    [COMPLETED] tigecycline (TYGACIL) 100 mg in sodium chloride 0.9 % 100 mL IVPB, 100 mg, IntraVENous, Once, Stopped at 11/05/22 1637 **FOLLOWED BY** tigecycline (TYGACIL) 50 mg in sodium chloride 0.9 % 100 mL IVPB, 50 mg, IntraVENous, Q12H, Raheem Flanagan MD, Last Rate: 100 mL/hr at 11/06/22 0622, 50 mg at 11/06/22 0622    cefTRIAXone (ROCEPHIN) 2,000 mg in sterile water 20 mL IV syringe, 2,000 mg, IntraVENous, Q24H, Raheem Flanagan MD    acetylcysteine (MUCOMYST) 20 % solution 600 mg, 600 mg, Inhalation, TID, Franky Calixto MD, 600 mg at 11/06/22 0408    Current Outpatient Medications:     nicotine (NICODERM CQ) 7 MG/24HR, Place 1 patch onto the skin every 24 hours for 14 days Begin after completing 14mg patch x 2 weeks. (Patient not taking: Reported on 11/4/2022), Disp: 14 patch, Rfl: 0    nicotine (NICODERM CQ) 14 MG/24HR, Place 1 patch onto the skin every 24 hours for 14 days Begin after completing 21mg patch x 6 weeks.  (Patient not taking: Reported on 11/4/2022), Disp: 14 patch, Rfl: 0    nicotine (NICODERM CQ) 21 MG/24HR, Place 1 patch onto the skin every 24 hours (Patient not taking: Reported on 11/4/2022), Disp: 42 patch, Rfl: 0    fluticasone-umeclidin-vilant (Jimi Habermann) 100-62.5-25 MCG/INH AEPB, INHALE 1 PUFF INTO THE LUNGS DAILY, Disp: 1 each, Rfl: 1    folic acid (FOLVITE) 1 MG tablet, Take 1 tablet by mouth daily, Disp: 90 tablet, Rfl: 1    pantoprazole (PROTONIX) 40 MG tablet, TAKE ONE TABLET BY MOUTH EVERY DAY WITH BREAKFAST, Disp: 90 tablet, Rfl: 0    atorvastatin (LIPITOR) 20 MG tablet, Take 1 tablet by mouth daily, Disp: 90 tablet, Rfl: 1    albuterol sulfate HFA (PROAIR HFA) 108 (90 Base) MCG/ACT inhaler, INHALE TWO PUFFS INTO THE LUNGS EVERY 6 HOURS AS NEEDED FOR SHORTNESS OF BREATH, Disp: 8.5 g, Rfl: 2    Cholecalciferol (VITAMIN D3) 2000 units CAPS, TAKE ONE CAPSULE BY MOUTH EVERY DAY, Disp: 30 capsule, Rfl: 5    traZODone (DESYREL) 100 MG tablet, Take 100 mg by mouth nightly Takes 2 tabs nightly as needed, Disp: , Rfl:   PHYSICAL EXAM     /77   Pulse 69   Temp 98.1 °F (36.7 °C) (Oral)   Resp 14   Ht 6' (1.829 m)   Wt 215 lb (97.5 kg)   SpO2 94%   BMI 29.16 kg/m²   Temp  Av.4 °F (36.9 °C)  Min: 98.1 °F (36.7 °C)  Max: 98.7 °F (37.1 °C)   CONSTITUTIONAL:  no apparent distress, and appears stated age  ENT:  Normocephalic, atraumatic, sinuses nontender,     LUNGS:  No increased work of breathing,dec to auscultation bilaterally, no crackles or wheezing  CARDIOVASCULAR:  Normal apical impulse, regular rate and rhythm, normal S1 and S2, no S3 or S4, and no murmur noted  ABDOMEN:  No scars, normal bowel sounds, soft, non-distended, non-tender  MUSCULOSKELETAL:  There is no redness, warmth, or swelling of the joints. Full range of motion noted. NEUROLOGIC:  Awake, alert, oriented. Cranial nerves II-XII are grossly intact.      SKIN:  normal skin color, texture, turgor and no rashes  Lines:          Peripheral Intravenous Line:  Peripheral IV 22 Right Antecubital (Active)   Site Assessment Clean, dry & intact 22   Line Status Blood return noted 22   Phlebitis Assessment No symptoms 22   Infiltration Assessment 0 22       Peripheral IV 22 Left Forearm (Active)           DIAGNOSTIC RESULTS   Radiology:    Recent Labs     22  0541   WBC 16.1* 11.9*   RBC 4.26 4.10   HGB 12.7 12.1*   HCT 38.3 37.0   MCV 89.9 90.2   MCH 29.8 29.5   MCHC 33.2 32.7   RDW 13.2 13.1   * 593*   MPV 8.2 8.7     Recent Labs     11/04/22 1959 11/06/22 0541    137   K 3.5 4.2   CL 97* 104   CO2 24 22   BUN 5* 15   CREATININE 0.9 0.6*   GLUCOSE 96 153*   PROT 7.1 6.5   LABALBU 3.4* 2.9*   CALCIUM 9.0 9.0   BILITOT 0.6 0.3   ALKPHOS 85 77   AST 52* 32   ALT 52* 43*     Lab Results   Component Value Date    CRP 13.4 (H) 11/05/2022     Lab Results   Component Value Date    SEDRATE 95 (H) 11/05/2022     Recent Labs     11/04/22 1959 11/05/22 0702 11/06/22 0541   CRP  --  13.4*  --    PROCAL  --  0.07  --    AST 52*  --  32   ALT 52*  --  43*   TRIG  --   --  75     Lab Results   Component Value Date/Time    CHOL 148 11/06/2022 05:41 AM    TRIG 75 11/06/2022 05:41 AM    HDL 20 11/06/2022 05:41 AM    LDLCALC 113 11/06/2022 05:41 AM    LABVLDL 15 11/06/2022 05:41 AM     Lab Results   Component Value Date/Time    VITD25 39 10/14/2022 10:09 AM       Microbiology:   Recent Labs     11/05/22  0702   COVID19 Not Detected     Lab Results   Component Value Date/Time    BC 5 Days- no growth 03/02/2018 04:26 AM    BC 5 Days- no growth 07/13/2015 11:45 AM    BLOODCULT2 5 Days- no growth 03/02/2018 04:26 AM    BLOODCULT2 5 Days- no growth 07/13/2015 11:57 AM        Imaging and labs were reviewed per medical records. POC was discussed with Marcin Perdue. The patient was educated about the diagnosis, indications, risks and benefits of treatment. An opportunity to ask questions was given to the patient/FAMILY. Thank you for involving me in the care of Marcin Perdue I will continue to follow. Please do not hesitate to call [phone] for any questions or concerns.     Electronically signed by Cecil Reddy MD on 11/6/2022 at 8:30 AM

## 2022-11-06 NOTE — PROGRESS NOTES
PULMONARY/ CRITICAL CARE MEDICINE FOLLOW UP    61year old person with PMH as described below admitted to hospital for management of       Acute hypoxemic respiratory failure and sepsis secondary to community acquired pneumonia in a patient with COPD  --Oxygen supplementation to maintain sats > 89%, currently on room air  --Antibiotic regimen as per ID: Ceftriaxone and tigecycline  --Cultures reviewed, no growth and urinary antigens are negative  --Continue bronchodilators  --Steroids for total 5 days  --Continue mucomyst for total 3 days  --DVT prophylaxis with enoxaparin       Discharge recommendations:   --The patient will need exercise oximetry prior to discharge  --Bronchodilators: LABA/ICS + LAMA (Symbicort/advair + Spiriva or Trelegy Ellipta)  --Please refer to outpatient pulmonology as he will need follow up with CT scan in 6 weeks to ensure resolution of the infiltrates    Rest of supportive care as per primary team    /76   Pulse 78   Temp 97.7 °F (36.5 °C) (Oral)   Resp 20   Ht 6' (1.829 m)   Wt 215 lb (97.5 kg)   SpO2 97% Comment: room air while ambulating  BMI 29.16 kg/m²   General: Awake, oriented to place, time and person  HEENT: No head lesions, PERRL, EOMI, mouth without lesions, no nasal lesions, no cervical adenopathy palpated  Respiratory: Lungs with equal breath sounds bilaterally, no adventitious sounds auscultated, no accessory muscle use  CV: Regular rate, no murmurs, no JVD, no leg edema  Abdomen: Soft, non tender, + bowel sounds, no lesions  Skin: Hydrated, adequate turgor, no rash, capillary refill <2 seconds  Extremities: Muscular strength 5/5 in 4 limbs, moves 4 limbs spontaneously, distal pulses present  Neurology: Awake and alert, follows commands, moves 4 limbs on command and spontaneously, neck is supple, no meningitic signs present.       Due to clinical improvement, we will not follow daily but PRN  Please call intensivist on call if the patient's condition were to worsen. Thank you for allowing us to participate in the care of Mr Raven Stanley.        MEDICATIONS:   methylPREDNISolone  40 mg IntraVENous Q12H    tigecycline (TYGACIL) IVPB  50 mg IntraVENous Q12H    enoxaparin  40 mg SubCUTAneous Daily    atorvastatin  20 mg Oral Daily    Vitamin D  2,000 Units Oral Daily    folic acid  1 mg Oral Daily    traZODone  100 mg Oral Nightly    budesonide  0.5 mg Nebulization BID    Arformoterol Tartrate  15 mcg Nebulization BID    cefTRIAXone (ROCEPHIN) IV  2,000 mg IntraVENous Q24H    acetylcysteine  600 mg Inhalation TID       albuterol, hydrALAZINE, magnesium sulfate, sodium phosphate IVPB **OR** sodium phosphate IVPB, potassium chloride **OR** potassium alternative oral replacement **OR** potassium chloride, acetaminophen    OBJECTIVE:  Vitals:    11/06/22 1014   BP:    Pulse:    Resp:    Temp:    SpO2: 97%           O2 Device: None (Room air)        LABS:  WBC   Date Value Ref Range Status   11/06/2022 11.9 (H) 4.5 - 11.5 E9/L Final   11/04/2022 16.1 (H) 4.5 - 11.5 E9/L Final   10/14/2022 9.7 3.5 - 11.0 K/uL Final   04/11/2022 7.6 4.5 - 11.5 E9/L Final     Hemoglobin   Date Value Ref Range Status   11/06/2022 12.1 (L) 12.5 - 16.5 g/dL Final   11/04/2022 12.7 12.5 - 16.5 g/dL Final   10/14/2022 14.8 13.5 - 17.0 g/dL Final     Hematocrit   Date Value Ref Range Status   11/06/2022 37.0 37.0 - 54.0 % Final   11/04/2022 38.3 37.0 - 54.0 % Final   10/14/2022 44.1 40.0 - 51.0 % Final     MCV   Date Value Ref Range Status   11/06/2022 90.2 80.0 - 99.9 fL Final   11/04/2022 89.9 80.0 - 99.9 fL Final   10/14/2022 90.4 80.0 - 99.0 fL Final     Platelets   Date Value Ref Range Status   11/06/2022 593 (H) 130 - 450 E9/L Final   11/04/2022 570 (H) 130 - 450 E9/L Final   10/14/2022 324 130 - 400 K/uL Final   04/11/2022 324 130 - 450 E9/L Final     Sodium   Date Value Ref Range Status   11/06/2022 137 132 - 146 mmol/L Final   11/04/2022 136 132 - 146 mmol/L Final   10/14/2022 141 133 - 146 meq/L Final     Potassium   Date Value Ref Range Status   11/06/2022 4.2 3.5 - 5.0 mmol/L Final   11/04/2022 3.5 3.5 - 5.0 mmol/L Final   10/14/2022 4.2 3.5 - 5.4 meq/L Final     Potassium reflex Magnesium   Date Value Ref Range Status   03/03/2018 4.6 3.5 - 5.0 mmol/L Final     Comment:     Specimen is moderately Hemolyzed. Result may be artificially increased.      Chloride   Date Value Ref Range Status   11/06/2022 104 98 - 107 mmol/L Final   11/04/2022 97 (L) 98 - 107 mmol/L Final   10/14/2022 104 95 - 107 meq/L Final     CO2   Date Value Ref Range Status   11/06/2022 22 22 - 29 mmol/L Final   11/04/2022 24 22 - 29 mmol/L Final   10/14/2022 27 19 - 31 meq/L Final     BUN   Date Value Ref Range Status   11/06/2022 15 6 - 20 mg/dL Final   11/04/2022 5 (L) 6 - 20 mg/dL Final   10/14/2022 12 6 - 20 mg/dL Final     Creatinine   Date Value Ref Range Status   11/06/2022 0.6 (L) 0.7 - 1.2 mg/dL Final   11/04/2022 0.9 0.7 - 1.2 mg/dL Final   10/14/2022 1.11 0.80 - 1.40 mg/dL Final     Glucose   Date Value Ref Range Status   11/06/2022 153 (H) 74 - 99 mg/dL Final   11/04/2022 96 74 - 99 mg/dL Final   10/14/2022 91 70 - 99 mg/dL Final   04/11/2022 101 (H) 74 - 99 mg/dL Final     Calcium   Date Value Ref Range Status   11/06/2022 9.0 8.6 - 10.2 mg/dL Final   11/04/2022 9.0 8.6 - 10.2 mg/dL Final   10/14/2022 9.5 8.5 - 10.5 mg/dL Final     Total Protein   Date Value Ref Range Status   11/06/2022 6.5 6.4 - 8.3 g/dL Final   11/04/2022 7.1 6.4 - 8.3 g/dL Final   10/14/2022 7.2 6.1 - 8.3 g/dL Final     Albumin   Date Value Ref Range Status   11/06/2022 2.9 (L) 3.5 - 5.2 g/dL Final   11/04/2022 3.4 (L) 3.5 - 5.2 g/dL Final   10/14/2022 4.7 3.5 - 5.2 g/dL Final     Total Bilirubin   Date Value Ref Range Status   11/06/2022 0.3 0.0 - 1.2 mg/dL Final   11/04/2022 0.6 0.0 - 1.2 mg/dL Final   10/14/2022 0.2 <=1.2 mg/dL Final     Alkaline Phosphatase   Date Value Ref Range Status   11/06/2022 77 40 - 129 U/L Final   11/04/2022 85 40 - 129 U/L Final   04/11/2022 70 40 - 129 U/L Final     Alk Phosphatase   Date Value Ref Range Status   10/14/2022 76 40 - 123 U/L Final     AST   Date Value Ref Range Status   11/06/2022 32 0 - 39 U/L Final     Comment:     Specimen is slightly Hemolyzed. Result may be artificially increased. 11/04/2022 52 (H) 0 - 39 U/L Final   10/14/2022 13 9 - 50 U/L Final     ALT   Date Value Ref Range Status   11/06/2022 43 (H) 0 - 40 U/L Final   11/04/2022 52 (H) 0 - 40 U/L Final   10/14/2022 16 5 - 50 U/L Final     Est, Glom Filt Rate   Date Value Ref Range Status   11/06/2022 >60 >=60 mL/min/1.73 Final     Comment:     Pediatric calculator link  [email or web address] [email or web address]/gfr_calculatorped  Effective Oct 3, 2022  These results are not intended for use in patients  <25years of age. eGFR results are calculated without  a race factor using the 2021 CKD-EPI equation. Careful  clinical correlation is recommended, particularly when  comparing to results calculated using previous equations. The CKD-EPI equation is less accurate in patients with  extremes of muscle mass, extra-renal metabolism of  creatinine, excessive creatinine ingestion, or following  therapy that affects renal tubular secretion. 11/04/2022 >60 >=60 mL/min/1.73 Final     Comment:     Pediatric calculator link  [email or web address] org/[email or web address]/gfr_calculatorped  Effective Oct 3, 2022  These results are not intended for use in patients  <25years of age. eGFR results are calculated without  a race factor using the 2021 CKD-EPI equation. Careful  clinical correlation is recommended, particularly when  comparing to results calculated using previous equations. The CKD-EPI equation is less accurate in patients with  extremes of muscle mass, extra-renal metabolism of  creatinine, excessive creatinine ingestion, or following  therapy that affects renal tubular secretion.        GFR Non-   Date Value Ref Range Status 04/11/2022 >60 >=60 mL/min/1.73 Final     Comment:     Chronic Kidney Disease: less than 60 ml/min/1.73 sq.m. Kidney Failure: less than 15 ml/min/1.73 sq.m. Results valid for patients 18 years and older. 10/04/2021 >60 >=60 mL/min/1.73 Final     Comment:     Chronic Kidney Disease: less than 60 ml/min/1.73 sq.m. Kidney Failure: less than 15 ml/min/1.73 sq.m. Results valid for patients 18 years and older. 04/06/2021 >60 >=60 mL/min/1.73 Final     Comment:     Chronic Kidney Disease: less than 60 ml/min/1.73 sq.m. Kidney Failure: less than 15 ml/min/1.73 sq.m. Results valid for patients 18 years and older. GFR    Date Value Ref Range Status   04/11/2022 >60  Final   10/04/2021 >60  Final   04/06/2021 >60  Final     Magnesium   Date Value Ref Range Status   11/06/2022 2.2 1.6 - 2.6 mg/dL Final   11/04/2022 1.8 1.6 - 2.6 mg/dL Final     Phosphorus   Date Value Ref Range Status   11/06/2022 3.6 2.5 - 4.5 mg/dL Final     No results for input(s): PH, PO2, PCO2, HCO3, BE, O2SAT in the last 72 hours. RADIOLOGY:  CT ABDOMEN PELVIS W IV CONTRAST Additional Contrast? Oral   Final Result   1. Left lung findings, as described above. There are discussed in full   detail on the report from the patient's CT scan of the chest performed the   same day. Please refer to that transcription. 2.  No acute abdominal or pelvic pathology. 3.  Small amount of fluid in the ascending colon, which can be seen in   diarrheal disease. CT CHEST W CONTRAST   Final Result   8.6 cm infiltrate of the superior segment left lower lobe with associated   mild pleural thickening up to 13 mm. There is no adenopathy. Taken   together, the findings suggest infectious process such as pulmonary abscess   and developing empyema over malignancy. However, careful clinical   correlation and imaging follow-up until resolution is required in order to   exclude malignancy. RECOMMENDATIONS:   Careful clinical correlation and follow up recommended. XR CHEST (2 VW)   Final Result   Dense triangular-shaped infiltrate in the superior segment left lower lobe   with left hilar fullness suspicious for malignancy. RECOMMENDATION:   I recommend CT scan of the chest and mediastinum with IV contrast media. PROBLEM LIST:  Principal Problem:    Abscess of lower lobe of left lung with pneumonia ([address]  Resolved Problems:    * No resolved hospital problems.  *    Janelle Davis MD  Pulmonary and Critical Care Medicine

## 2022-11-07 LAB
ALBUMIN SERPL-MCNC: 2.8 G/DL (ref 3.5–5.2)
ALP BLD-CCNC: 69 U/L (ref 40–129)
ALT SERPL-CCNC: 61 U/L (ref 0–40)
ANION GAP SERPL CALCULATED.3IONS-SCNC: 11 MMOL/L (ref 7–16)
AST SERPL-CCNC: 50 U/L (ref 0–39)
BASOPHILS ABSOLUTE: 0.01 E9/L (ref 0–0.2)
BASOPHILS RELATIVE PERCENT: 0.1 % (ref 0–2)
BILIRUB SERPL-MCNC: 0.3 MG/DL (ref 0–1.2)
BILIRUBIN DIRECT: <0.2 MG/DL (ref 0–0.3)
BILIRUBIN, INDIRECT: ABNORMAL MG/DL (ref 0–1)
BUN BLDV-MCNC: 19 MG/DL (ref 6–20)
CALCIUM SERPL-MCNC: 8.5 MG/DL (ref 8.6–10.2)
CHLORIDE BLD-SCNC: 107 MMOL/L (ref 98–107)
CO2: 21 MMOL/L (ref 22–29)
CREAT SERPL-MCNC: 0.7 MG/DL (ref 0.7–1.2)
EOSINOPHILS ABSOLUTE: 0 E9/L (ref 0.05–0.5)
EOSINOPHILS RELATIVE PERCENT: 0 % (ref 0–6)
GFR SERPL CREATININE-BSD FRML MDRD: >60 ML/MIN/1.73
GLUCOSE BLD-MCNC: 135 MG/DL (ref 74–99)
HCT VFR BLD CALC: 36 % (ref 37–54)
HEMOGLOBIN: 11.9 G/DL (ref 12.5–16.5)
IMMATURE GRANULOCYTES #: 0.13 E9/L
IMMATURE GRANULOCYTES %: 0.8 % (ref 0–5)
LYMPHOCYTES ABSOLUTE: 1.17 E9/L (ref 1.5–4)
LYMPHOCYTES RELATIVE PERCENT: 7 % (ref 20–42)
MAGNESIUM: 2.3 MG/DL (ref 1.6–2.6)
MCH RBC QN AUTO: 30.5 PG (ref 26–35)
MCHC RBC AUTO-ENTMCNC: 33.1 % (ref 32–34.5)
MCV RBC AUTO: 92.3 FL (ref 80–99.9)
MONOCYTES ABSOLUTE: 0.34 E9/L (ref 0.1–0.95)
MONOCYTES RELATIVE PERCENT: 2 % (ref 2–12)
NEUTROPHILS ABSOLUTE: 15.11 E9/L (ref 1.8–7.3)
NEUTROPHILS RELATIVE PERCENT: 90.1 % (ref 43–80)
PDW BLD-RTO: 13.7 FL (ref 11.5–15)
PHOSPHORUS: 4 MG/DL (ref 2.5–4.5)
PLATELET # BLD: 658 E9/L (ref 130–450)
PMV BLD AUTO: 8.5 FL (ref 7–12)
POTASSIUM SERPL-SCNC: 4.6 MMOL/L (ref 3.5–5)
RBC # BLD: 3.9 E12/L (ref 3.8–5.8)
SODIUM BLD-SCNC: 139 MMOL/L (ref 132–146)
TOTAL PROTEIN: 6.1 G/DL (ref 6.4–8.3)
WBC # BLD: 16.8 E9/L (ref 4.5–11.5)

## 2022-11-07 PROCEDURE — 94669 MECHANICAL CHEST WALL OSCILL: CPT

## 2022-11-07 PROCEDURE — 2580000003 HC RX 258: Performed by: SPECIALIST

## 2022-11-07 PROCEDURE — 85025 COMPLETE CBC W/AUTO DIFF WBC: CPT

## 2022-11-07 PROCEDURE — 94640 AIRWAY INHALATION TREATMENT: CPT

## 2022-11-07 PROCEDURE — 1200000000 HC SEMI PRIVATE

## 2022-11-07 PROCEDURE — 36415 COLL VENOUS BLD VENIPUNCTURE: CPT

## 2022-11-07 PROCEDURE — 6360000002 HC RX W HCPCS: Performed by: SPECIALIST

## 2022-11-07 PROCEDURE — 83735 ASSAY OF MAGNESIUM: CPT

## 2022-11-07 PROCEDURE — 80048 BASIC METABOLIC PNL TOTAL CA: CPT

## 2022-11-07 PROCEDURE — 6360000002 HC RX W HCPCS: Performed by: INTERNAL MEDICINE

## 2022-11-07 PROCEDURE — 84100 ASSAY OF PHOSPHORUS: CPT

## 2022-11-07 PROCEDURE — 97165 OT EVAL LOW COMPLEX 30 MIN: CPT

## 2022-11-07 PROCEDURE — 6370000000 HC RX 637 (ALT 250 FOR IP): Performed by: NURSE PRACTITIONER

## 2022-11-07 PROCEDURE — 94150 VITAL CAPACITY TEST: CPT

## 2022-11-07 PROCEDURE — 6360000002 HC RX W HCPCS: Performed by: NURSE PRACTITIONER

## 2022-11-07 PROCEDURE — 80076 HEPATIC FUNCTION PANEL: CPT

## 2022-11-07 RX ORDER — PREDNISONE 20 MG/1
40 TABLET ORAL
Status: DISCONTINUED | OUTPATIENT
Start: 2022-11-07 | End: 2022-11-09 | Stop reason: HOSPADM

## 2022-11-07 RX ORDER — BUSPIRONE HYDROCHLORIDE 10 MG/1
10 TABLET ORAL 2 TIMES DAILY
Status: DISCONTINUED | OUTPATIENT
Start: 2022-11-07 | End: 2022-11-09 | Stop reason: HOSPADM

## 2022-11-07 RX ORDER — PANTOPRAZOLE SODIUM 40 MG/1
40 TABLET, DELAYED RELEASE ORAL
Status: DISCONTINUED | OUTPATIENT
Start: 2022-11-08 | End: 2022-11-09 | Stop reason: HOSPADM

## 2022-11-07 RX ADMIN — ARFORMOTEROL TARTRATE 15 MCG: 15 SOLUTION RESPIRATORY (INHALATION) at 17:22

## 2022-11-07 RX ADMIN — Medication 2000 UNITS: at 08:18

## 2022-11-07 RX ADMIN — ALBUTEROL SULFATE 2.5 MG: 2.5 SOLUTION RESPIRATORY (INHALATION) at 10:05

## 2022-11-07 RX ADMIN — ACETYLCYSTEINE 600 MG: 200 SOLUTION ORAL; RESPIRATORY (INHALATION) at 17:22

## 2022-11-07 RX ADMIN — CEFTRIAXONE 2000 MG: 2 INJECTION, POWDER, FOR SOLUTION INTRAMUSCULAR; INTRAVENOUS at 13:12

## 2022-11-07 RX ADMIN — SODIUM CHLORIDE 50 MG: 9 INJECTION, SOLUTION INTRAVENOUS at 05:42

## 2022-11-07 RX ADMIN — ARFORMOTEROL TARTRATE 15 MCG: 15 SOLUTION RESPIRATORY (INHALATION) at 06:47

## 2022-11-07 RX ADMIN — PREDNISONE 40 MG: 20 TABLET ORAL at 10:33

## 2022-11-07 RX ADMIN — ACETYLCYSTEINE 600 MG: 200 SOLUTION ORAL; RESPIRATORY (INHALATION) at 06:47

## 2022-11-07 RX ADMIN — ATORVASTATIN CALCIUM 20 MG: 20 TABLET, FILM COATED ORAL at 08:18

## 2022-11-07 RX ADMIN — ACETYLCYSTEINE 600 MG: 200 SOLUTION ORAL; RESPIRATORY (INHALATION) at 10:06

## 2022-11-07 RX ADMIN — FOLIC ACID 1 MG: 1 TABLET ORAL at 08:18

## 2022-11-07 RX ADMIN — BUDESONIDE 500 MCG: 0.5 SUSPENSION RESPIRATORY (INHALATION) at 17:21

## 2022-11-07 RX ADMIN — BUDESONIDE 500 MCG: 0.5 SUSPENSION RESPIRATORY (INHALATION) at 06:47

## 2022-11-07 RX ADMIN — ALBUTEROL SULFATE 2.5 MG: 2.5 SOLUTION RESPIRATORY (INHALATION) at 17:22

## 2022-11-07 RX ADMIN — ENOXAPARIN SODIUM 40 MG: 100 INJECTION SUBCUTANEOUS at 08:18

## 2022-11-07 RX ADMIN — SODIUM CHLORIDE 50 MG: 9 INJECTION, SOLUTION INTRAVENOUS at 18:30

## 2022-11-07 ASSESSMENT — ENCOUNTER SYMPTOMS
SHORTNESS OF BREATH: 1
EYES NEGATIVE: 1
COUGH: 1
GASTROINTESTINAL NEGATIVE: 1
ALLERGIC/IMMUNOLOGIC NEGATIVE: 1

## 2022-11-07 NOTE — PROGRESS NOTES
Internal Medicine Progress Note    [facility]    Miguel Ángel Elias. Ysabel Barnes., REAGANONAHOMI Calvert D.O., PERRY Almaguer D.O. Russell Kawasaki, MSN, APRN, NP-C  Mick Silva. Ramses Sol, MSN, APRN-CNP     Primary Care Physician: Maria L Almaguer DO   Admitting Physician:  Matheus Almodovar DO  Admission date and time: 11/4/2022  6:25 PM    Room:  [address] [place] [address]  Admitting diagnosis: Abscess of lower lobe of left lung with pneumonia [facility]) [J85.1]    Patient Name: Josef Scanlon  MRN: [id number]    Date of Service: 11/7/2022     Subjective:  Sheryle Sly is a 61 y.o. male who was seen and examined today,11/7/2022, at the bedside. He continues to improve on a daily basis. He is not requiring oxygen. He is expectorating sputum. He is being followed by the ID and pulmonary teams. He is improved overall and we have discussed the possibility for impending discharge pending on antibiotic recommendations as per the ID team.  We have also discussed the history of his depression and his self discontinuation of medications. He is comfortable establishing with a psychiatrist and psychologist and we will asked the  to help arrange this. No family present during my examination. Review of System:   Constitutional:   Denies any further fever or chills, denies weight loss or gain, fatigue or malaise. HEENT:   Denies ear pain, sore throat, sinus or eye problems. Cardiovascular:   Denies any chest pain, irregular heartbeats, or palpitations. Respiratory:   No resting dyspnea. No exertional dyspnea. Continues with sputum expectoration due to cough. Gastrointestinal:   Denies nausea, vomiting, diarrhea, or constipation. Denies any abdominal pain. Genitourinary:    Denies any urgency, frequency, hematuria. Voiding  without difficulty. Extremities:   Denies lower extremity swelling, edema or cyanosis.    Neurology:    Denies any headache or focal neurological deficits, positive for improving generalized weakness and fatigue without focal complaint. Psch:   Denies being anxious or depressed. Musculoskeletal:    Denies  myalgias, joint complaints or back pain. Integumentary:   Denies any rashes, ulcers, or excoriations. Denies bruising. Hematologic/Lymphatic:  Denies bruising or bleeding. Physical Exam:  No intake/output data recorded. No intake or output data in the 24 hours ending 11/07/22 0931No intake/output data recorded. Patient Vitals for the past 96 hrs (Last 3 readings):   Weight   11/05/22 0018 215 lb (97.5 kg)     Vital Signs:   Blood pressure 113/76, pulse 62, temperature 97.9 °F (36.6 °C), temperature source Oral, resp. rate 20, height 6' (1.829 m), weight 215 lb (97.5 kg), SpO2 95 %. General appearance:  Alert, responsive, oriented to person, place, and time. Comfortable appearing, no distress. Head:  Normocephalic. No masses, lesions or tenderness. Eyes:  PERRLA. EOMI. Sclera clear. ENT:  Ears normal. Mucosa normal.  Neck:    Supple. Trachea midline. No thyromegaly. No JVD. No bruits. Heart:    Rhythm regular. Rate controlled. S1 and S2.  Lungs:    Symmetrical.  Air exchange is improved and now mildly diminished. Otherwise lungs are clear to auscultation bilaterally. No wheezes. No rhonchi. No rales. Abdomen:   Soft. Non-tender. Non-distended. Bowel sounds positive. No organomegaly or masses. No pain on palpation. Extremities:    Peripheral pulses present. No peripheral edema. No ulcers. No cyanosis. No clubbing. Neurologic:    Alert x 3. No focal deficit. Cranial nerves grossly intact. No focal weakness. Psych:   Behavior is normal. Mood appears normal. Speech is not rapid and/or pressured. Musculoskeletal:   No unilateral joint edema, erythema or warmth. Gait not assessed. Integumentary:  No rashes  Skin normal color and texture.   Genitalia/Breast:  Deferred    Medication:  Scheduled Meds:   busPIRone  10 mg Oral BID predniSONE  40 mg Oral Daily with breakfast    tigecycline (TYGACIL) IVPB  50 mg IntraVENous Q12H    enoxaparin  40 mg SubCUTAneous Daily    atorvastatin  20 mg Oral Daily    Vitamin D  2,000 Units Oral Daily    folic acid  1 mg Oral Daily    traZODone  100 mg Oral Nightly    budesonide  0.5 mg Nebulization BID    Arformoterol Tartrate  15 mcg Nebulization BID    cefTRIAXone (ROCEPHIN) IV  2,000 mg IntraVENous Q24H    acetylcysteine  600 mg Inhalation TID     Objective Data:  CBC with Differential:    Lab Results   Component Value Date/Time    WBC 16.8 11/07/2022 06:18 AM    RBC 3.90 11/07/2022 06:18 AM    RBC 4.88 10/14/2022 10:09 AM    HGB 11.9 11/07/2022 06:18 AM    HCT 36.0 11/07/2022 06:18 AM     11/07/2022 06:18 AM    MCV 92.3 11/07/2022 06:18 AM    MCH 30.5 11/07/2022 06:18 AM    MCHC 33.1 11/07/2022 06:18 AM    RDW 13.7 11/07/2022 06:18 AM    LYMPHOPCT 7.0 11/07/2022 06:18 AM    LYMPHOPCT 23.3 10/14/2022 10:09 AM    MONOPCT 2.0 11/07/2022 06:18 AM    EOSPCT 2.5 10/14/2022 10:09 AM    BASOPCT 0.1 11/07/2022 06:18 AM    MONOSABS 0.34 11/07/2022 06:18 AM    LYMPHSABS 1.17 11/07/2022 06:18 AM    EOSABS 0.00 11/07/2022 06:18 AM    BASOSABS 0.01 11/07/2022 06:18 AM     BMP:    Lab Results   Component Value Date/Time     11/07/2022 06:18 AM    K 4.6 11/07/2022 06:18 AM    K 4.6 03/03/2018 06:00 AM     11/07/2022 06:18 AM    CO2 21 11/07/2022 06:18 AM    BUN 19 11/07/2022 06:18 AM    LABALBU 2.8 11/07/2022 06:18 AM    CREATININE 0.7 11/07/2022 06:18 AM    CALCIUM 8.5 11/07/2022 06:18 AM    GFRAA >60 04/11/2022 12:58 PM    LABGLOM >60 11/07/2022 06:18 AM    GLUCOSE 135 11/07/2022 06:18 AM    GLUCOSE 91 10/14/2022 10:09 AM       Wound Documentation:   Incision 07/06/15 Abdomen (Active)   Number of days: 7156       Assessment:  Sepsis secondary to pulmonary abscess versus empyema with strong clinical suspicion for aspiration  Acute exacerbation of COPD complicated by ongoing tobacco abuse  Mild dehydration secondary to nausea, poor intake and diarrhea  Impaired fasting glucose  Gastroesophageal reflux disease  Hyperlipidemia  Bipolar Depression and anxiety previously on Vraylar, Effexor, Vistaril, BuSpar and trazodone  Intermittent insufflation of cocaine likely contributing to aspiration    Plan:   Agnie Pires is doing much better overall. He has been seen by the ID and pulmonary teams and the recommendations have been noted. Antibiotics have been adjusted by the ID team and the patient is on Tygacil and Rocephin at present. Infectious work-up is being undertaken and further antibiotic adjustments as per the ID team.  We await final antibiotic recommendations. From a pulmonary standpoint, expectorants are being utilized with chest vest therapy the patient is doing well. He has less COPD exacerbation symptoms and we will transition to oral steroids. Continue with respiratory supportive measures and pulmonary toileting. From these aspects, I believe the patient is approaching discharge and will discuss case further with the ID team in preparation for potential oral antibiotic therapy upon discharge. We have reviewed the history of his anxiety and depression at length. States that he was previously on benzodiazepines for anxiety control with good effect however his psychiatrist became less comfortable prescribing these and he was on multiple medications with intolerances to some degree and concern for his own development of tardive dyskinesia he discontinued these things after speaking with his primary care provider. He discontinued psychotherapy secondary to what he describes as a lack of success with discussing things of any sort of depth. We have asked the  to assist in arrangement for new outpatient psychiatry appointment as he previously followed with psych care. We will resume BuSpar.   We will defer additional antipsychotic medications to his eventual follow-up with psychiatrist. His chronic morbidities, labs and vital signs are being monitored and addressed accordingly. Discharge planning is underway and we anticipate discharge tomorrow if oral antibiotics are planned. Continue current therapy. See orders for further plan of care. More than 50% of my  time was spent at the bedside counseling/coordinating care with the patient and/or family with face to face contact. This time was spent reviewing notes and laboratory data as well as instructing and counseling the patient. Time I spent with the family or surrogate(s) is included only if the patient was incapable of providing the necessary information or participating in medical decisions. I also discussed the differential diagnosis and all of the proposed management plans with the patient and individuals accompanying the patient. Marisol Ryan requires this high level of physician care and nursing on the IMC/Telemetry unit due the complexity of decision management and chance of rapid decline or death. Continued cardiac monitoring and higher level of nursing are required. I am readily available for any further decision-making and intervention.      Ever Ashraf, CAMILA - CNP  11/7/2022  9:31 AM

## 2022-11-07 NOTE — PROGRESS NOTES
Pulmonary/Critical Care Progress Note    We are following patient for left lleft lung abscess    SUBJECTIVE:  61year old gentleman admitted with hypoxic resp failure and pneumonia. Recent radiographs concerning for abscess. History of prior smoking. Recent CAT scan shows an 8.9 cm infiltrate concern for abscess versus bronchogenic carcinoma    MEDICATIONS:   busPIRone  10 mg Oral BID    predniSONE  40 mg Oral Daily with breakfast    tigecycline (TYGACIL) IVPB  50 mg IntraVENous Q12H    enoxaparin  40 mg SubCUTAneous Daily    atorvastatin  20 mg Oral Daily    Vitamin D  2,000 Units Oral Daily    folic acid  1 mg Oral Daily    traZODone  100 mg Oral Nightly    budesonide  0.5 mg Nebulization BID    Arformoterol Tartrate  15 mcg Nebulization BID    cefTRIAXone (ROCEPHIN) IV  2,000 mg IntraVENous Q24H    acetylcysteine  600 mg Inhalation TID       albuterol, hydrALAZINE, magnesium sulfate, sodium phosphate IVPB **OR** sodium phosphate IVPB, potassium chloride **OR** potassium alternative oral replacement **OR** potassium chloride, acetaminophen    Review of Systems   Constitutional:  Positive for fatigue. HENT: Negative. Eyes: Negative. Respiratory:  Positive for cough and shortness of breath. Cardiovascular: Negative. Gastrointestinal: Negative. Endocrine: Negative. Genitourinary: Negative. Musculoskeletal: Negative. Skin: Negative. Allergic/Immunologic: Negative. Neurological: Negative. Hematological: Negative. Psychiatric/Behavioral: Negative.    62-year-old patient admitted with acute hypoxic respiratory failure    OBJECTIVE:  Vitals:    11/07/22 0823   BP: 113/76   Pulse: 62   Resp: 20   Temp: 97.9 °F (36.6 °C)   SpO2: 95%           O2 Device: None (Room air)    PHYSICAL EXAM:  Constitutional: Patient is alert interactive minimizing symptoms  Skin: No skin breakdown normal  HEENT: Neck is supple no JVD no tracheal ovation no thyroid enlargement  Neck: No crepitance  Cardiovascular: Rate and rhythm regular no gallop no murmur  Respiratory: Prolonged expiratory phase few rales at the left base  Gastrointestinal: Soft bowel sounds present  Genitourinary: Deferred  Extremities: +2/4 pulses no signs of DVT or ischemia  Neurological: Alert and appropriate follows all commands  Psychological: Appropriate    LABS:  WBC   Date Value Ref Range Status   11/07/2022 16.8 (H) 4.5 - 11.5 E9/L Final   11/06/2022 11.9 (H) 4.5 - 11.5 E9/L Final   11/04/2022 16.1 (H) 4.5 - 11.5 E9/L Final     Hemoglobin   Date Value Ref Range Status   11/07/2022 11.9 (L) 12.5 - 16.5 g/dL Final   11/06/2022 12.1 (L) 12.5 - 16.5 g/dL Final   11/04/2022 12.7 12.5 - 16.5 g/dL Final     Hematocrit   Date Value Ref Range Status   11/07/2022 36.0 (L) 37.0 - 54.0 % Final   11/06/2022 37.0 37.0 - 54.0 % Final   11/04/2022 38.3 37.0 - 54.0 % Final     MCV   Date Value Ref Range Status   11/07/2022 92.3 80.0 - 99.9 fL Final   11/06/2022 90.2 80.0 - 99.9 fL Final   11/04/2022 89.9 80.0 - 99.9 fL Final     Platelets   Date Value Ref Range Status   11/07/2022 658 (H) 130 - 450 E9/L Final   11/06/2022 593 (H) 130 - 450 E9/L Final   11/04/2022 570 (H) 130 - 450 E9/L Final     Sodium   Date Value Ref Range Status   11/07/2022 139 132 - 146 mmol/L Final   11/06/2022 137 132 - 146 mmol/L Final   11/04/2022 136 132 - 146 mmol/L Final     Potassium   Date Value Ref Range Status   11/07/2022 4.6 3.5 - 5.0 mmol/L Final   11/06/2022 4.2 3.5 - 5.0 mmol/L Final   11/04/2022 3.5 3.5 - 5.0 mmol/L Final     Potassium reflex Magnesium   Date Value Ref Range Status   03/03/2018 4.6 3.5 - 5.0 mmol/L Final     Comment:     Specimen is moderately Hemolyzed. Result may be artificially increased.      Chloride   Date Value Ref Range Status   11/07/2022 107 98 - 107 mmol/L Final   11/06/2022 104 98 - 107 mmol/L Final   11/04/2022 97 (L) 98 - 107 mmol/L Final     CO2   Date Value Ref Range Status   11/07/2022 21 (L) 22 - 29 mmol/L Final 11/06/2022 22 22 - 29 mmol/L Final   11/04/2022 24 22 - 29 mmol/L Final     BUN   Date Value Ref Range Status   11/07/2022 19 6 - 20 mg/dL Final   11/06/2022 15 6 - 20 mg/dL Final   11/04/2022 5 (L) 6 - 20 mg/dL Final     Creatinine   Date Value Ref Range Status   11/07/2022 0.7 0.7 - 1.2 mg/dL Final   11/06/2022 0.6 (L) 0.7 - 1.2 mg/dL Final   11/04/2022 0.9 0.7 - 1.2 mg/dL Final     Glucose   Date Value Ref Range Status   11/07/2022 135 (H) 74 - 99 mg/dL Final   11/06/2022 153 (H) 74 - 99 mg/dL Final   11/04/2022 96 74 - 99 mg/dL Final   10/14/2022 91 70 - 99 mg/dL Final     Calcium   Date Value Ref Range Status   11/07/2022 8.5 (L) 8.6 - 10.2 mg/dL Final   11/06/2022 9.0 8.6 - 10.2 mg/dL Final   11/04/2022 9.0 8.6 - 10.2 mg/dL Final     Total Protein   Date Value Ref Range Status   11/07/2022 6.1 (L) 6.4 - 8.3 g/dL Final   11/06/2022 6.5 6.4 - 8.3 g/dL Final   11/04/2022 7.1 6.4 - 8.3 g/dL Final     Albumin   Date Value Ref Range Status   11/07/2022 2.8 (L) 3.5 - 5.2 g/dL Final   11/06/2022 2.9 (L) 3.5 - 5.2 g/dL Final   11/04/2022 3.4 (L) 3.5 - 5.2 g/dL Final     Total Bilirubin   Date Value Ref Range Status   11/07/2022 0.3 0.0 - 1.2 mg/dL Final   11/06/2022 0.3 0.0 - 1.2 mg/dL Final   11/04/2022 0.6 0.0 - 1.2 mg/dL Final     Alkaline Phosphatase   Date Value Ref Range Status   11/07/2022 69 40 - 129 U/L Final   11/06/2022 77 40 - 129 U/L Final   11/04/2022 85 40 - 129 U/L Final     AST   Date Value Ref Range Status   11/07/2022 50 (H) 0 - 39 U/L Final   11/06/2022 32 0 - 39 U/L Final     Comment:     Specimen is slightly Hemolyzed. Result may be artificially increased.    11/04/2022 52 (H) 0 - 39 U/L Final     ALT   Date Value Ref Range Status   11/07/2022 61 (H) 0 - 40 U/L Final   11/06/2022 43 (H) 0 - 40 U/L Final   11/04/2022 52 (H) 0 - 40 U/L Final     Est, Glom Filt Rate   Date Value Ref Range Status   11/07/2022 >60 >=60 mL/min/1.73 Final     Comment:     Pediatric calculator Ref Range Status   04/11/2022 >60  Final   10/04/2021 >60  Final   04/06/2021 >60  Final     Magnesium   Date Value Ref Range Status   11/07/2022 2.3 1.6 - 2.6 mg/dL Final   11/06/2022 2.2 1.6 - 2.6 mg/dL Final   11/04/2022 1.8 1.6 - 2.6 mg/dL Final     Phosphorus   Date Value Ref Range Status   11/07/2022 4.0 2.5 - 4.5 mg/dL Final   11/06/2022 3.6 2.5 - 4.5 mg/dL Final     No results for input(s): PH, PO2, PCO2, HCO3, BE, O2SAT in the last 72 hours. RADIOLOGY:  CT ABDOMEN PELVIS W IV CONTRAST Additional Contrast? Oral   Final Result   1. Left lung findings, as described above. There are discussed in full   detail on the report from the patient's CT scan of the chest performed the   same day. Please refer to that transcription. 2.  No acute abdominal or pelvic pathology. 3.  Small amount of fluid in the ascending colon, which can be seen in   diarrheal disease. CT CHEST W CONTRAST   Final Result   8.6 cm infiltrate of the superior segment left lower lobe with associated   mild pleural thickening up to 13 mm. There is no adenopathy. Taken   together, the findings suggest infectious process such as pulmonary abscess   and developing empyema over malignancy. However, careful clinical   correlation and imaging follow-up until resolution is required in order to   exclude malignancy. RECOMMENDATIONS:   Careful clinical correlation and follow up recommended. XR CHEST (2 VW)   Final Result   Dense triangular-shaped infiltrate in the superior segment left lower lobe   with left hilar fullness suspicious for malignancy. RECOMMENDATION:   I recommend CT scan of the chest and mediastinum with IV contrast media. CT CHEST W CONTRAST    (Results Pending)           PROBLEM LIST:  Principal Problem:    Abscess of lower lobe of left lung with pneumonia ([address]  Resolved Problems:    * No resolved hospital problems.  *      IMPRESSION:  Concerning abscess left lower lung versus bronchogenic postobstructive process with carcinoma  Underlying COPD    PLAN:  Plan for bronchoscopy this is necessary as density mass appears more irregular suggestive of bronchogenic carcinoma  2. Antibiotics respiratory measures of treatment additionally and oxygen supplementation as needed maintain sats above 90%    ATTESTATION:  ICU Staff Physician note of personal involvement in Care  As the attending physician, I certify that I personally reviewed the patients history and personnally examined the patient to confirm the physical findings described above,  And that I reviewed the relevant imaging studies and available reports. I also discussed the differential diagnosis and all of the proposed management plans with the patient and individuals accompanying the patient to this visit. They had the opportunity to ask questions about the proposed management plans and to have those questions answered.      Electronically signed by Lexi Pickens DO on 11/7/2022 at 3:14 PM

## 2022-11-07 NOTE — PROGRESS NOTES
[address] [facility]  Mariaelena Bourne. [place]        Date:2022                                                  Patient Name: Bin Maya    MRN: [id number]    : 1965    Room: [address] [place], [place] [address]-      Evaluating OT: Danita Brambila OTR/L #[id number]     Referring Provider and Specific Provider Orders/Date:      22  OT eval and treat  Start:  22,   End:  22,   ONE TIME,   Standing Count:  1 Occurrences,   R         Bud Lingo, APRN - CNP      Placement Recommendation: Home        Diagnosis:   1. Abscess of lower lobe of left lung with pneumonia [facility])         Surgery: None       Pertinent Medical History:       Past Medical History:   Diagnosis Date    COPD (chronic obstructive pulmonary disease) ([facility] [address]     Depression     Hyperlipidemia          Past Surgical History:   Procedure Laterality Date    CHOLECYSTECTOMY, LAPAROSCOPIC  7-6-15      Precautions:  Up in chair as much as tolerated, at least breakfast through dinner time. Ambulate in room as much as tolerated or increase activity. Incentive spirometer 10-15 times per hour while awake.  Flutter valve every 1-2 hours, depression      Assessment of current deficits    [x] Functional mobility  [x]ADLs  [x] Strength               []Cognition    [x] Functional transfers   [x] IADLs         [] Safety Awareness   [x]Endurance    [] Fine Coordination              [] Balance      [] Vision/perception   []Sensation     []Gross Motor Coordination  [] ROM  [] Delirium                   [] Motor Control     OT PLAN OF CARE   OT POC based on physician orders, patient diagnosis and results of clinical assessment    Frequency/Duration 1-3 visits during length of stay     Specific OT Treatment Interventions to include:   * Instruction/training on adapted ADL techniques and AE recommendations to increase functional independence within precautions * Training on energy conservation strategies, correct breathing pattern and techniques to improve independence/tolerance for self-care routine  * Functional transfer/mobility training/DME recommendations for increased independence, safety, and fall prevention  * Patient/Family education to increase follow through with safety techniques and functional independence  * Recommendation of environmental modifications for increased safety with functional transfers/mobility and ADLs  * Therapeutic exercise to improve motor endurance, ROM, and functional strength for ADLs/functional transfers  * Therapeutic activities to facilitate/challenge dynamic balance, stand tolerance for increased safety and independence with ADLs    Recommended Adaptive Equipment: TBD      Home Living: Patient lives with roommate  in a ranch home  with 1 steps  to enter   Tub shower grab bars       Equipment owned: [employer]     [facility] Level of Function: Independent with ADLs , roommate assists with IADLs, goes to [employer]; ambulated independently. Driving: Yes   Occupation: [employer]     Pain Level: pt denied pain; Nursing notified.       Cognition: A&O: 4/4; Follows 3 step directions   Memory: intact    Sequencing: intact    Problem solving: intact    Judgement/safety: intact     [facility]   AM-PAC Daily Activity Inpatient   How much help for putting on and taking off regular lower body clothing?: None  How much help for Bathing?: A Little  How much help for Toileting?: A Little  How much help for putting on and taking off regular upper body clothing?: None  How much help for taking care of personal grooming?: None  How much help for eating meals?: None  AM-PAC Inpatient Daily Activity Raw Score: 22  AM-PAC Inpatient ADL T-Scale Score : 47.1  ADL Inpatient CMS 0-100% Score: 25.8  ADL Inpatient CMS G-Code Modifier : MU     Functional Assessment:    Initial Eval Status  Date: 11/7/22   Treatment Status  Date: STGs = LTGs  Time frame: 10-14 days   Feeding Independent         Grooming Independent   With item retrieval/transport to bathroom for oral hygiene         UB Dressing Independent        LB Dressing Independent   To doff/don socks seated at EOB; simulated pants while standing        Bathing Supervision     Independent    Toileting Supervision     Independent    Bed Mobility  Supine to sit: Independent   Sit to supine:  Independent     Supine to sit: Independent   Sit to supine: Independent    Functional Transfers Sit to stand: Supervision   Stand to sit: Supervision   Commode Transfer: Supervision     Independent    Functional Mobility Supervision without AD to/from bathroom. No episodes of loss of balance. Independent    Balance Sitting:     Static: good    Dynamic: good  Standing: fair plus/good     Sitting:     Static: good    Dynamic: good  Standing: good    Activity Tolerance fair  plus   Increase standing tolerance >5  minutes for improved engagement with functional transfers and indep in ADLs     Visual/  Perceptual Glasses:  yes     Reports changes in vision since admission: no      NA      Hand Dominance: right      AROM (PROM) Strength Additional Info:    RUE  WFL 4/5 good  and wfl FMC/dexterity noted during ADL tasks     LUE WFL 4/5 good  and wfl FMC/dexterity noted during ADL tasks       Hearing: [facility]   Sensation:  No c/o numbness or tingling  Tone: WFL   Edema: None      Vitals:  HR at rest: 83 bpm HR with activity:  bpm HR at end of session: 78 bpm   SpO2 at rest: 97% SpO2 with activity: % SpO2 at end of session: 97%   BP at rest:  BP with activity:  BP at end of session:      Comments: RN cleared patient for OT. Upon arrival patient in supine. Therapist facilitated and instructed pt on adapted  techniques & compensatory strategies to improve safety and independence with basic ADLs, bed mobility, functional transfers and mobility to allow pt to achieve highest level of independence and safely.   Pt demonstrated good understanding of education & follow through. Pt did express feelings of depression/anxiety and is motivated for mental health treatment. SW and nursing aware. At end of session, patient was at EOB with call light and phone within reach, all lines and tubes intact. Overall, patient demonstrated  decreased independence and safety during completion of ADL tasks. Pt would benefit from continued skilled OT to increase safety and independence with completion of ADL tasks and functional mobility for improved quality of life. Rehab Potential: Good for established goals. Patient / Family Goal: pt in agreement with POC       Patient and/or family were instructed on functional diagnosis, prognosis/goals and OT plan of care. Demonstrated good understanding. Eval Complexity: Low    Time In: 10:25 AM   Time Out: 10:45 AM    Total Treatment Time: 0       Min Units   OT Eval Low 97165  X  1    OT Eval Medium 84482      OT Eval High 85441      OT Re-Eval [id number]            ADL/Self Care 89642     Therapeutic Activities 81505       Therapeutic Ex 62724       Orthotic Management 79872       Manual 20222     Neuro Re-Ed 27392       Non-Billable Time        Evaluation Time additionally includes thorough review of current medical information, gathering information on past medical history/social history and prior level of function, interpretation of standardized testing/informal observation of tasks, assessment of data and development of plan of care and goals.         Evaluating OT: Myron Cabrera OTR/L #[id number]

## 2022-11-07 NOTE — CARE COORDINATION
11/7/2022 1351 CM note: Negative covid 11/5/22. Met with patient for transition of care needs. Pt resides with his roommate Jairo Zapien in a ranch home. He is independent but does not drive. PCP is Dr Rolando Kennedy and uses [facility]. [facility], no [facility]. He previously attended Psych care in [facility] and [place] and prefers to attend [facility] outpatient counseling after discharge. Per Yahir Gale at  [facility]([phone]), she will schedule diagnostic assessment and psychiatrist appt once hospital d/c date is known. CM will call [facility] day of d/c to obtain appt. CM will also follow for possible abx needs. Pt for CT chest . He plans to return home at d/c and will have transportation.  Nellie HATCH

## 2022-11-07 NOTE — PROGRESS NOTES
River Manzano  MR:  [id number]  :   1965  Admit Date:  2022      This is a face to face encounter with Marcin Perdue 61 y.o. male on 22  Elements of this note, including Diagnosis,  Interval History, Past Medical/Surgical/Family/Social Histories, ROS, physical exam, and Assessment and Plan were copied and pasted from Previous. Updates have been made where noted and reflect current exam and medical decision making from the DOS of this encounter. CHIEF COMPLAINT     ID following for   Chief Complaint   Patient presents with    Fever     CHILLS/ SICK AFTER FLU SHOT/ HAD FEVER ON OCT 25/ BURNING UP THIS AM BUT DID NOT TAKE TEMP/ HAD APPT WITH  BUT TO SICK     HISTORY OF PRESENT ILLNESS   The patient is a 61 y.o. man not known to the Infectious Diseases service. The patient is admitted through the emergency room with fevers chills shortness of breath with productive cough. Patient has generalized malaise as well as diffuse myalgias and headaches. In the emergency room his white count was 16,000 hemoglobin 12.7 with LFTs of 52 and C-reactive protein is 13.4. His BUN and creatinine was 5 and 0.9. His albumin was only 3.4 and his respiratory viral panel was nondiagnostic urine for Legionella and strep pneumo are pending respiratory culture was sent. Empirically he was started on Merrem, Vibramycin, Solu-Medrol, vancomycin. He claims to have allergies or reactions with a rash to quinolones, hives with penicillin and shortness of breath with clindamycin. Patient recently got a flu shot back 2 weeks prior to this admission. The patient admits to taking trazodone and Benadryl to help him sleep. He also admitted to snort cocaine intermittently last snorting session was in October      Assessment & Plan   [facility] lower lobe of left lung [facility] [J85.1]  -left lung  -aspiration  Fevers  Leukocytosis  transaminitis  Allergies -pcn/cipro/clinda      Plan:    On solu-medrol Continue tygacil   Continue Rocephin   Pulmonary critical care following- for Bronch tomorrow  Monitor labs- watch LFTs  Check cultures     Pt seen and examined  DOS  11/07/22  In bed on RA  Has no c/o f/c/n/v/d  Cough better      Patient is tolerating medications. No reported adverse drug reactions. REVIEW OF SYSTEMS     As stated above in the chief complaint, otherwise negative.   CURRENT MEDICATIONS     Current Facility-Administered Medications:     busPIRone (BUSPAR) tablet 10 mg, 10 mg, Oral, BID, Ana Rosangela APRN - CNP    predniSONE (DELTASONE) tablet 40 mg, 40 mg, Oral, Daily with breakfast, Analynn Adames APRN - CNP, 40 mg at 11/07/22 1033    tigecycline (TYGACIL) 50 mg, [id number] in sodium chloride 0.9 % 100 mL IVPB, 50 mg, IntraVENous, Q12H, Rita Wilkins MD, Stopped at 11/07/22 0649    enoxaparin (LOVENOX) injection 40 mg, 40 mg, SubCUTAneous, Daily, Dru Arias MD, 40 mg at 11/07/22 0818    atorvastatin (LIPITOR) tablet 20 mg, 20 mg, Oral, Daily, Ana Rosangela, APRN - CNP, 20 mg at 11/07/22 0818    Vitamin D (CHOLECALCIFEROL) tablet 2,000 Units, 2,000 Units, Oral, Daily, Ana Rosangela APRN - CNP, 2,000 Units at 45/07/13 7309    folic acid (FOLVITE) tablet 1 mg, 1 mg, Oral, Daily, Analynn Adames APRN - CNP, 1 mg at 11/07/22 0818    traZODone (DESYREL) tablet 100 mg, 100 mg, Oral, Nightly, Ana Rosangela, APRN - CNP, 100 mg at 11/05/22 2319    albuterol (PROVENTIL) nebulizer solution 2.5 mg, 2.5 mg, Nebulization, Q4H PRN, Ana Rosangela, APRN - CNP, 2.5 mg at 11/07/22 1005    budesonide (PULMICORT) nebulizer suspension 500 mcg, 0.5 mg, Nebulization, BID, Ana Rosangela, APRN - CNP, 500 mcg at 11/07/22 0647    hydrALAZINE (APRESOLINE) injection 5 mg, 5 mg, IntraVENous, Q4H PRN, CAMILA Palmer - BERTIN    magnesium sulfate 1000 mg in dextrose 5% 100 mL IVPB, 1,000 mg, IntraVENous, PRN, CAMILA Palmer - CNP    sodium phosphate 15.6 mmol in sodium chloride 0.9 % 250 mL IVPB, 0.16 mmol/kg, IntraVENous, PRN **OR** sodium phosphate 31.2 mmol in sodium chloride 0.9 % 500 mL IVPB, 0.32 mmol/kg, IntraVENous, PRN, Chapincito Crockett APRN - CNP    potassium chloride (KLOR-CON M) extended release tablet 40 mEq, 40 mEq, Oral, PRN **OR** potassium bicarb-citric acid (EFFER-K) effervescent tablet 40 mEq, 40 mEq, Oral, PRN **OR** potassium chloride 10 mEq/100 mL IVPB (Peripheral Line), 10 mEq, IntraVENous, PRN, Chapincito Crockett APRN - CNP    Arformoterol Tartrate (BROVANA) nebulizer solution 15 mcg, 15 mcg, Nebulization, BID, CAMILA Rendon - CNP, 15 mcg at 22 0772    acetaminophen (TYLENOL) tablet 650 mg, 650 mg, Oral, Q6H PRN, Venia Kane, DO, 650 mg at 22 6302    cefTRIAXone (ROCEPHIN) 2,000 mg in sterile water 20 mL IV syringe, 2,000 mg, IntraVENous, Q24H, Bouchra Llanes MD, 2,000 mg at 22 1312    acetylcysteine (MUCOMYST) 20 % solution 600 mg, 600 mg, Inhalation, TID, Sarai Gilman MD, 600 mg at 22 1006  PHYSICAL EXAM     /76   Pulse 62   Temp 97.9 °F (36.6 °C) (Oral)   Resp 20   Ht 6' (1.829 m)   Wt 215 lb (97.5 kg)   SpO2 95%   BMI 29.16 kg/m²   Temp  Av.8 °F (36.6 °C)  Min: 97.7 °F (36.5 °C)  Max: 97.9 °F (36.6 °C)   CONSTITUTIONAL:  no apparent distress, and appears stated age  ENT:  Normocephalic, atraumatic, sinuses nontender,     LUNGS:  No increased work of breathing,dec to auscultation bilaterally, no crackles or wheezing. Diminished to left base. On room air. CARDIOVASCULAR:  Normal apical impulse, regular rate and rhythm, normal S1 and S2, no S3 or S4, and no murmur noted  ABDOMEN:  No scars, normal bowel sounds, soft, non-distended, non-tender  MUSCULOSKELETAL:  There is no redness, warmth, or swelling of the joints. Full range of motion noted. NEUROLOGIC:  Awake, alert, oriented. Cranial nerves II-XII are grossly intact.      SKIN:  normal skin color, texture, turgor and no rashes  Lines:  PIV        Peripheral Intravenous Line:  Peripheral IV 11/04/22 Right Antecubital (Active)   Site Assessment Clean, dry & intact 11/04/22 2109   Line Status Blood return noted 11/04/22 2109   Phlebitis Assessment No symptoms 11/04/22 2109   Infiltration Assessment 0 11/04/22 2109       Peripheral IV 11/05/22 Left Forearm (Active)     DIAGNOSTIC RESULTS   Radiology:    Recent Labs     11/04/22 1959 11/06/22 0541 11/07/22 0618   WBC 16.1* 11.9* 16.8*   RBC 4.26 4.10 3.90   HGB 12.7 12.1* 11.9*   HCT 38.3 37.0 36.0*   MCV 89.9 90.2 92.3   MCH 29.8 29.5 30.5   MCHC 33.2 32.7 33.1   RDW 13.2 13.1 13.7   * 593* 658*   MPV 8.2 8.7 8.5       Recent Labs     11/04/22 1959 11/06/22 0541 11/07/22 0618    137 139   K 3.5 4.2 4.6   CL 97* 104 107   CO2 24 22 21*   BUN 5* 15 19   CREATININE 0.9 0.6* 0.7   GLUCOSE 96 153* 135*   PROT 7.1 6.5 6.1*   LABALBU 3.4* 2.9* 2.8*   CALCIUM 9.0 9.0 8.5*   BILITOT 0.6 0.3 0.3   ALKPHOS 85 77 69   AST 52* 32 50*   ALT 52* 43* 61*       Lab Results   Component Value Date    CRP 13.4 (H) 11/05/2022     Lab Results   Component Value Date    SEDRATE 95 (H) 11/05/2022     Recent Labs     11/04/22 1959 11/05/22 0702 11/06/22 0541 11/07/22 0618   CRP  --  13.4*  --   --    PROCAL  --  0.07  --   --    AST 52*  --  32 50*   ALT 52*  --  43* 61*   TRIG  --   --  75  --        Lab Results   Component Value Date/Time    CHOL 148 11/06/2022 05:41 AM    TRIG 75 11/06/2022 05:41 AM    HDL 20 11/06/2022 05:41 AM    LDLCALC 113 11/06/2022 05:41 AM    LABVLDL 15 11/06/2022 05:41 AM     Lab Results   Component Value Date/Time    VITD25 39 10/14/2022 10:09 AM       Microbiology:   Recent Labs     11/05/22  0702   COVID19 Not Detected       Lab Results   Component Value Date/Time    BC 24 Hours no growth 11/05/2022 07:25 AM    BC 24 Hours no growth 11/05/2022 12:28 AM    BC 24 Hours no growth 11/05/2022 12:28 AM    BLOODCULT2 24 Hours no growth 11/05/2022 07:25 AM    BLOODCULT2 5 Days- no growth 03/02/2018 04:26 AM BLOODCULT2 5 Days- no growth 07/13/2015 11:57 AM        Imaging and labs were reviewed per medical records. POC was discussed with Marcin Perdue. The patient was educated about the diagnosis, indications, risks and benefits of treatment. An opportunity to ask questions was given to the patient/FAMILY. Thank you for involving me in the care of Marcin Perdue I will continue to follow. Please do not hesitate to call [phone] for any questions or concerns. Electronically signed by CAMILA Sweeney on 11/7/2022 at 3:22 PM      As above       This is a face to face encounter with Vanessa Carranza on 11/07/22. I discussed the findings and plans with  NURSE PRACTITIONER, CAMILA Sweeney and agree as documented in her note. See below for additional details and treatment plan. Vanessa Carranza is a 61 y.o. male who presents with   has a past medical history of COPD (chronic obstructive pulmonary disease) ([address] Depression, and Hyperlipidemia. In bed feels well on RA  Fevers resolved   BS sounds better  Pulm to bronch check for aspergillus/abscess/maliganancy   Cont atbx  Resp cx GVB/GPC Clusters    Imaging and labs were reviewed. Vanessa Carranza was informed of their diagnosis, indications, risks and benefits of treatment. Vanessa Carranza had the opportunity to ask questions. All questions were answered. Thank you for involving me in the care of Vanessa Carranza. Please do not hesitate to call for any questions or concerns.     Electronically signed by Ginny Cintron MD on 11/7/2022 at 5:47 PM

## 2022-11-08 LAB
(1,3)-BETA-D-GLUCAN (FUNGITELL) INTERPRETATION: NEGATIVE
(1,3)-BETA-D-GLUCAN (FUNGITELL): <31 PG/ML
ALBUMIN SERPL-MCNC: 2.7 G/DL (ref 3.5–5.2)
ALP BLD-CCNC: 63 U/L (ref 40–129)
ALT SERPL-CCNC: 47 U/L (ref 0–40)
ANION GAP SERPL CALCULATED.3IONS-SCNC: 8 MMOL/L (ref 7–16)
AST SERPL-CCNC: 22 U/L (ref 0–39)
BASOPHILS ABSOLUTE: 0.01 E9/L (ref 0–0.2)
BASOPHILS RELATIVE PERCENT: 0.1 % (ref 0–2)
BILIRUB SERPL-MCNC: 0.3 MG/DL (ref 0–1.2)
BILIRUBIN DIRECT: <0.2 MG/DL (ref 0–0.3)
BILIRUBIN, INDIRECT: ABNORMAL MG/DL (ref 0–1)
BUN BLDV-MCNC: 19 MG/DL (ref 6–20)
CALCIUM SERPL-MCNC: 8.2 MG/DL (ref 8.6–10.2)
CHLORIDE BLD-SCNC: 105 MMOL/L (ref 98–107)
CO2: 23 MMOL/L (ref 22–29)
CREAT SERPL-MCNC: 0.8 MG/DL (ref 0.7–1.2)
CULTURE, RESPIRATORY: NORMAL
EOSINOPHILS ABSOLUTE: 0.01 E9/L (ref 0.05–0.5)
EOSINOPHILS RELATIVE PERCENT: 0.1 % (ref 0–6)
GFR SERPL CREATININE-BSD FRML MDRD: >60 ML/MIN/1.73
GLUCOSE BLD-MCNC: 92 MG/DL (ref 74–99)
HCT VFR BLD CALC: 35 % (ref 37–54)
HEMOGLOBIN: 11.6 G/DL (ref 12.5–16.5)
IMMATURE GRANULOCYTES #: 0.18 E9/L
IMMATURE GRANULOCYTES %: 1.3 % (ref 0–5)
LYMPHOCYTES ABSOLUTE: 2.99 E9/L (ref 1.5–4)
LYMPHOCYTES RELATIVE PERCENT: 21 % (ref 20–42)
MAGNESIUM: 2.5 MG/DL (ref 1.6–2.6)
MCH RBC QN AUTO: 30.7 PG (ref 26–35)
MCHC RBC AUTO-ENTMCNC: 33.1 % (ref 32–34.5)
MCV RBC AUTO: 92.6 FL (ref 80–99.9)
MONOCYTES ABSOLUTE: 0.86 E9/L (ref 0.1–0.95)
MONOCYTES RELATIVE PERCENT: 6 % (ref 2–12)
NEUTROPHILS ABSOLUTE: 10.17 E9/L (ref 1.8–7.3)
NEUTROPHILS RELATIVE PERCENT: 71.5 % (ref 43–80)
PDW BLD-RTO: 13.9 FL (ref 11.5–15)
PHOSPHORUS: 3.6 MG/DL (ref 2.5–4.5)
PLATELET # BLD: 624 E9/L (ref 130–450)
PMV BLD AUTO: 8.2 FL (ref 7–12)
POTASSIUM SERPL-SCNC: 4.3 MMOL/L (ref 3.5–5)
RBC # BLD: 3.78 E12/L (ref 3.8–5.8)
SMEAR, RESPIRATORY: NORMAL
SODIUM BLD-SCNC: 136 MMOL/L (ref 132–146)
TOTAL PROTEIN: 5.7 G/DL (ref 6.4–8.3)
WBC # BLD: 14.2 E9/L (ref 4.5–11.5)

## 2022-11-08 PROCEDURE — 6360000002 HC RX W HCPCS: Performed by: INTERNAL MEDICINE

## 2022-11-08 PROCEDURE — 1200000000 HC SEMI PRIVATE

## 2022-11-08 PROCEDURE — 85025 COMPLETE CBC W/AUTO DIFF WBC: CPT

## 2022-11-08 PROCEDURE — 80076 HEPATIC FUNCTION PANEL: CPT

## 2022-11-08 PROCEDURE — 6370000000 HC RX 637 (ALT 250 FOR IP): Performed by: NURSE PRACTITIONER

## 2022-11-08 PROCEDURE — 2580000003 HC RX 258: Performed by: SPECIALIST

## 2022-11-08 PROCEDURE — 97530 THERAPEUTIC ACTIVITIES: CPT | Performed by: PHYSICAL THERAPIST

## 2022-11-08 PROCEDURE — 6360000002 HC RX W HCPCS: Performed by: SPECIALIST

## 2022-11-08 PROCEDURE — 94640 AIRWAY INHALATION TREATMENT: CPT

## 2022-11-08 PROCEDURE — 80048 BASIC METABOLIC PNL TOTAL CA: CPT

## 2022-11-08 PROCEDURE — 84100 ASSAY OF PHOSPHORUS: CPT

## 2022-11-08 PROCEDURE — 83735 ASSAY OF MAGNESIUM: CPT

## 2022-11-08 PROCEDURE — 6360000002 HC RX W HCPCS: Performed by: NURSE PRACTITIONER

## 2022-11-08 PROCEDURE — 36415 COLL VENOUS BLD VENIPUNCTURE: CPT

## 2022-11-08 RX ADMIN — CEFTRIAXONE 2000 MG: 2 INJECTION, POWDER, FOR SOLUTION INTRAMUSCULAR; INTRAVENOUS at 13:28

## 2022-11-08 RX ADMIN — ARFORMOTEROL TARTRATE 15 MCG: 15 SOLUTION RESPIRATORY (INHALATION) at 06:04

## 2022-11-08 RX ADMIN — FOLIC ACID 1 MG: 1 TABLET ORAL at 14:13

## 2022-11-08 RX ADMIN — SODIUM CHLORIDE 50 MG: 9 INJECTION, SOLUTION INTRAVENOUS at 06:15

## 2022-11-08 RX ADMIN — ACETYLCYSTEINE 600 MG: 200 SOLUTION ORAL; RESPIRATORY (INHALATION) at 06:06

## 2022-11-08 RX ADMIN — ATORVASTATIN CALCIUM 20 MG: 20 TABLET, FILM COATED ORAL at 14:13

## 2022-11-08 RX ADMIN — Medication 2000 UNITS: at 14:13

## 2022-11-08 RX ADMIN — ALBUTEROL SULFATE 2.5 MG: 2.5 SOLUTION RESPIRATORY (INHALATION) at 17:59

## 2022-11-08 RX ADMIN — BUDESONIDE 500 MCG: 0.5 SUSPENSION RESPIRATORY (INHALATION) at 18:00

## 2022-11-08 RX ADMIN — SODIUM CHLORIDE 50 MG: 9 INJECTION, SOLUTION INTRAVENOUS at 18:33

## 2022-11-08 RX ADMIN — ARFORMOTEROL TARTRATE 15 MCG: 15 SOLUTION RESPIRATORY (INHALATION) at 17:59

## 2022-11-08 RX ADMIN — PREDNISONE 40 MG: 20 TABLET ORAL at 14:13

## 2022-11-08 RX ADMIN — PANTOPRAZOLE SODIUM 40 MG: 40 TABLET, DELAYED RELEASE ORAL at 14:17

## 2022-11-08 RX ADMIN — ENOXAPARIN SODIUM 40 MG: 100 INJECTION SUBCUTANEOUS at 14:20

## 2022-11-08 RX ADMIN — BUDESONIDE 500 MCG: 0.5 SUSPENSION RESPIRATORY (INHALATION) at 06:04

## 2022-11-08 NOTE — PROGRESS NOTES
Internal Medicine Progress Note    [facility]    Madeleine Crandall. Renny Mann, [email or web address]. Luis Alberto Glass D.O., NEEMA Issa, MSN, APRN, NP-C  Eliazar Boyce, MSN, APRN-CNP     Primary Care Physician: Britney Khoury DO   Admitting Physician:  Yas Simons DO  Admission date and time: 11/4/2022  6:25 PM    Room:  [address] [place] [address]  Admitting diagnosis: Abscess of lower lobe of left lung with pneumonia [facility]) [J85.1]    Patient Name: Kg Akbar  MRN: [id number]    Date of Service: 11/8/2022     Subjective:  Dianna Perez is a 61 y.o. male who was seen and examined today,11/8/2022, at the bedside. Dianna Perez is scheduled to undergo bronchoscopic intervention today. Clinically he continues to improve with significantly less shortness of breath. He is tolerating his current treatment strategy and I have personally discussed the case with the infectious disease team.    Review of System:   Constitutional:   Denies any further fever or chills, denies weight loss or gain, fatigue or malaise. HEENT:   Denies ear pain, sore throat, sinus or eye problems. Cardiovascular:   Denies any chest pain, irregular heartbeats, or palpitations. Respiratory:   Coughing with sputum production. He describes black-colored sputum production at times. Gastrointestinal:   Denies nausea, vomiting, diarrhea, or constipation. Denies any abdominal pain. Genitourinary:    Denies any urgency, frequency, hematuria. Voiding  without difficulty. Extremities:   Denies lower extremity swelling, edema or cyanosis. Neurology:    Denies any headache or focal neurological deficits, positive for improving generalized weakness and fatigue without focal complaint. Psch:   Denies being anxious or depressed. Musculoskeletal:    Denies  myalgias, joint complaints or back pain. Integumentary:   Denies any rashes, ulcers, or excoriations.   Denies bruising. Hematologic/Lymphatic:  Denies bruising or bleeding. Physical Exam:  No intake/output data recorded. Intake/Output Summary (Last 24 hours) at 11/8/2022 0851  Last data filed at 11/7/2022 1420  Gross per 24 hour   Intake 360 ml   Output --   Net 360 ml   I/O last 3 completed shifts: In: 720 [P.O.:720]  Out: -   Patient Vitals for the past 96 hrs (Last 3 readings):   Weight   11/05/22 0018 215 lb (97.5 kg)     Vital Signs:   Blood pressure 108/80, pulse 69, temperature 97.3 °F (36.3 °C), temperature source Oral, resp. rate 17, height 6' (1.829 m), weight 215 lb (97.5 kg), SpO2 96 %. General appearance:  Alert, responsive, oriented to person, place, and time. Comfortable appearing, no distress. Head:  Normocephalic. No masses, lesions or tenderness. Eyes:  PERRLA. EOMI. Sclera clear. ENT:  Ears normal. Mucosa normal.  Neck:    Supple. Trachea midline. No thyromegaly. No JVD. No bruits. Heart:    Rhythm regular. Rate controlled. S1 and S2.  Lungs:    Clear to auscultation bilaterally. I do not appreciate overt wheezes, rhonchi, or rales. Abdomen:   Soft. Non-tender. Non-distended. Bowel sounds positive. No organomegaly or masses. No pain on palpation. Extremities:    Peripheral pulses present. No peripheral edema. No ulcers. No cyanosis. No clubbing. Neurologic:    Alert x 3. No focal deficit. Cranial nerves grossly intact. No focal weakness. Psych:   Behavior is normal. Mood appears normal. Speech is not rapid and/or pressured. Musculoskeletal:   No unilateral joint edema, erythema or warmth. Gait not assessed. Integumentary:  No rashes  Skin normal color and texture.   Genitalia/Breast:  Deferred    Medication:  Scheduled Meds:   busPIRone  10 mg Oral BID    predniSONE  40 mg Oral Daily with breakfast    pantoprazole  40 mg Oral QAM AC    tigecycline (TYGACIL) IVPB  50 mg IntraVENous Q12H    enoxaparin  40 mg SubCUTAneous Daily    atorvastatin  20 mg Oral Daily    Vitamin D 2,000 Units Oral Daily    folic acid  1 mg Oral Daily    traZODone  100 mg Oral Nightly    budesonide  0.5 mg Nebulization BID    Arformoterol Tartrate  15 mcg Nebulization BID    cefTRIAXone (ROCEPHIN) IV  2,000 mg IntraVENous Q24H    acetylcysteine  600 mg Inhalation TID     Objective Data:  CBC with Differential:    Lab Results   Component Value Date/Time    WBC 14.2 11/08/2022 06:58 AM    RBC 3.78 11/08/2022 06:58 AM    RBC 4.88 10/14/2022 10:09 AM    HGB 11.6 11/08/2022 06:58 AM    HCT 35.0 11/08/2022 06:58 AM     11/08/2022 06:58 AM    MCV 92.6 11/08/2022 06:58 AM    MCH 30.7 11/08/2022 06:58 AM    MCHC 33.1 11/08/2022 06:58 AM    RDW 13.9 11/08/2022 06:58 AM    LYMPHOPCT 21.0 11/08/2022 06:58 AM    LYMPHOPCT 23.3 10/14/2022 10:09 AM    MONOPCT 6.0 11/08/2022 06:58 AM    EOSPCT 2.5 10/14/2022 10:09 AM    BASOPCT 0.1 11/08/2022 06:58 AM    MONOSABS 0.86 11/08/2022 06:58 AM    LYMPHSABS 2.99 11/08/2022 06:58 AM    EOSABS 0.01 11/08/2022 06:58 AM    BASOSABS 0.01 11/08/2022 06:58 AM     BMP:    Lab Results   Component Value Date/Time     11/08/2022 06:58 AM    K 4.3 11/08/2022 06:58 AM    K 4.6 03/03/2018 06:00 AM     11/08/2022 06:58 AM    CO2 23 11/08/2022 06:58 AM    BUN 19 11/08/2022 06:58 AM    LABALBU 2.7 11/08/2022 06:58 AM    CREATININE 0.8 11/08/2022 06:58 AM    CALCIUM 8.2 11/08/2022 06:58 AM    GFRAA >60 04/11/2022 12:58 PM    LABGLOM >60 11/08/2022 06:58 AM    GLUCOSE 92 11/08/2022 06:58 AM    GLUCOSE 91 10/14/2022 10:09 AM       Assessment:  Sepsis secondary to pulmonary abscess versus empyema with strong clinical suspicion for aspiration  Acute exacerbation of COPD complicated by ongoing tobacco abuse  Mild dehydration secondary to nausea, poor intake and diarrhea  Impaired fasting glucose  Gastroesophageal reflux disease  Hyperlipidemia  Bipolar Depression and anxiety previously on Vraylar, Effexor, Vistaril, BuSpar and trazodone  Intermittent insufflation of cocaine likely contributing to aspiration    Plan:   Ramu Wright continues to improve on a daily basis. He is scheduled to undergo bronchoscopic intervention today and we will continue forward with antibiotics as per the infectious disease team.  He has been weaned off nasal cannula oxygen. His appetite is improving with increased oral intake. He has become increasingly ambulatory. He is extremely anxious for discharge home and we appreciate case management input regarding outpatient follow-up for counseling. More than 50% of my  time was spent at the bedside counseling/coordinating care with the patient and/or family with face to face contact. This time was spent reviewing notes and laboratory data as well as instructing and counseling the patient. Time I spent with the family or surrogate(s) is included only if the patient was incapable of providing the necessary information or participating in medical decisions. I also discussed the differential diagnosis and all of the proposed management plans with the patient and individuals accompanying the patient. Ramu Wright requires this high level of physician care and nursing on the IMC/Telemetry unit due the complexity of decision management and chance of rapid decline or death. Continued cardiac monitoring and higher level of nursing are required. I am readily available for any further decision-making and intervention.      Estuardo Milan DO  11/8/2022  8:51 AM

## 2022-11-08 NOTE — PROGRESS NOTES
Physical Therapy  Physical Therapy Treatment Note/Plan of Care    Room #:  2262/8072-52  Patient Name: Randall Rowland  YOB: 1965  MRN: [id number]    Date of Service: 11/8/2022     Tentative placement recommendation: Home  Equipment recommendation: None      Evaluating Physical Therapist: Venu Jacobsen, PT  #[id number]      Specific Provider Orders/Date/Referring Provider :  11/05/22 0700    PT eval and treat  Start:  11/05/22 0700,   End:  11/05/22 0700,   ONE TIME,   Standing Count:  1 Occurrences,   R         Hamzah Ibarra, APRN - CNP     Admitting Diagnosis:   Abscess of lower lobe of left lung with pneumonia ([facility] [address] [J85.1]     Admitted with    fever and chills; sepsis  Surgery: none      Patient Active Problem List   Diagnosis    Gastroesophageal reflux disease    Vitamin D deficiency    Folate deficiency    Dyslipidemia    Cigarette nicotine dependence with nicotine-induced disorder    COPD (chronic obstructive pulmonary disease) ([address]    Bipolar disorder with depression ([facility] [address]    Prediabetes    Abscess of lower lobe of left lung with pneumonia ([facility] [address]        ASSESSMENT of Current Deficits Patient exhibits decreased balance and endurance impairing functional mobility, transfers, gait , gait distance, and tolerance to activity are barriers to d/c and require skilled intervention during hospital stay to attain pre hospital level of function. Pt mildly unsteady during ambulation with 2-3 small LOB with Gavin required for correction.      PHYSICAL THERAPY  PLAN OF CARE       Physical therapy plan of care is established based on physician order,  patient diagnosis and clinical assessment    Current Treatment Recommendations:    -Standing Balance: Perform strengthening exercises in standing to promote motor control with or without upper extremity support   -Gait: Gait training and Standing activities to improve: base of support, weight shift, weight bearing    -Endurance: Utilize Supervised activities to increase level of endurance to allow for safe functional mobility including transfers and gait     PT long term treatment goals are located in below grid    Patient and or family understand(s) diagnosis, prognosis, and plan of care. Frequency of treatments: Patient will be seen  daily. Prior Level of Function: Patient ambulated independently    Rehab Potential: good   for baseline    Past medical history:   Past Medical History:   Diagnosis Date    COPD (chronic obstructive pulmonary disease) ([facility] [address]     Depression     Hyperlipidemia      Past Surgical History:   Procedure Laterality Date    CHOLECYSTECTOMY, LAPAROSCOPIC  7-6-15       SUBJECTIVE:    Precautions: Check Pulse Oximetry while ambulating  and up in chair , falls and alarm ,      Social history: Patient lives with roommate  in a ranch home  with No steps  to enter   Tub shower grab bars    Equipment owned: [address]   How much difficulty turning over in bed?: None  How much difficulty sitting down on / standing up from a chair with arms?: None  How much difficulty moving from lying on back to sitting on side of bed?: None  How much help from another person moving to and from a bed to a chair?: None  How much help from another person needed to walk in hospital room?: A Little  How much help from another person for climbing 3-5 steps with a railing?: A Little  AM-PAC Inpatient Mobility Raw Score : 22  AM-PAC Inpatient T-Scale Score : 53.28  Mobility Inpatient CMS 0-100% Score: 20.91  Mobility Inpatient CMS G-Code Modifier : [address] cleared patient for PT treatment. OBJECTIVE;   Initial Evaluation  Date: 11/6/2022 Treatment Date:  11/8/2022     Short Term/ Long Term   Goals   Was pt agreeable to Eval/treatment? Yes Y To be met in 3 days   Pain level   0/10    0/10    Bed Mobility    Rolling: Independent    Supine to sit:  Independent    Sit to supine: Independent    Scooting: Independent    Rolling: Independent   Supine to sit: Independent   Sit to supine: Independent   Scooting: Independent       Transfers Sit to stand: Independent    Sit to stand: Independent       Ambulation     100 feet using  no device with Supervision    cues for safety and pacing 250' and 150 feet using  no device with Supervision    for balance, weight shift, and safety    200 feet using  no device with Independent    ROM Within functional limits        Strength BUE:   4/5  RLE:  4/5  LLE:  4/5  Increase strength in affected mm groups by 1/3 grade   Balance Sitting EOB:  good    Dynamic Standing:  good minus Sitting EOB: good   Dynamic Standing: fair +   Sitting EOB:  good    Dynamic Standing: good       Patient is Alert & Oriented x person, place, time, and situation and follows directions    Sensation:  Patient  denies numbness/tingling   Edema:  no   Endurance: fair       Vitals: room air   Blood Pressure at rest  Blood Pressure during session    Heart Rate at rest   Heart Rate during session    SPO2 at rest %  SPO2 during session %     Patient education  Patient educated on role of Physical Therapy, risks of immobility, safety and plan of care,  importance of mobility while in hospital , and safety      Patient response to education:   Pt verbalized understanding Pt demonstrated skill Pt requires further education in this area   Yes Partial Yes      Treatment:  Patient practiced and was instructed/facilitated in the following treatment: Patient   Sat edge of bed 5 minutes with Independent to increase dynamic sitting balance and activity tolerance. Therapeutic Exercises:  not performed       At end of session, patient in chair with  call light and phone within reach,  all lines and tubes intact, nursing notified. Patient would benefit from continued skilled Physical Therapy to improve functional independence and quality of life.          Patient's/ family goals   home    Time in  1251  Time out 1303    Total Treatment Time  12 minutes    CPT codes:  Therapeutic activities (57923)   12 minutes  1 unit(s)    Claudia Morales, PT no

## 2022-11-08 NOTE — CARE COORDINATION
11/8/2022 1141 CM note: Negative covid 11/5/22. Per IDR, pt for bronchoscopy today. He resides with his roommate Oracio Sarah in a ranch home. Pt prefers to attend [facility] outpatient counseling after discharge. Per Merced Kauffman at  [employer]  [facility]([phone]), she will schedule diagnostic assessment and psychiatrist appt once hospital d/c date is known. CM will call [facility] day of d/c to obtain appt. He plans to return home at d/c and will have transportation. CM will also follow for possible abx needs.  Nellie HATCH

## 2022-11-08 NOTE — PROGRESS NOTES
PULMONARY/ CRITICAL CARE MEDICINE FOLLOW UP    61year old person with PMH as described below admitted to hospital for management of       Acute hypoxemic respiratory failure and sepsis secondary to community acquired pneumonia in a patient with COPD  --Oxygen supplementation to maintain sats > 89%, currently on room air  --Antibiotic regimen as per ID: Ceftriaxone and tigecycline  --Cultures reviewed, no growth and urinary antigens are negative  --Continue bronchodilators  --Steroids for total 5 days  --Continue mucomyst for total 3 days  --DVT prophylaxis with enoxaparin       Reviewed issues with pt  Symptoms are most suggestive of recent PNA / infection  Malignancy cannot be excluded  Reasonable to d/c home on Antibiotics as below and do f/u CT  Pt understands and defers on Fiberoptic Bronchoscopy in favor of interval CT follow-up     Absolute smoking cessation    All questions answered       Discharge recommendations:   --The patient will need exercise oximetry prior to discharge  --Bronchodilators: LABA/ICS + LAMA (Symbicort/advair + Spiriva or Trelegy Ellipta)  --Please refer to outpatient pulmonology as he will need follow up with CT scan in 4- 6 weeks to ensure resolution of the infiltrates            ___________________________________________        Rest of supportive care as per primary team    /80   Pulse 69   Temp 97.3 °F (36.3 °C) (Oral)   Resp 17   Ht 6' (1.829 m)   Wt 215 lb (97.5 kg)   SpO2 96%   BMI 29.16 kg/m²   General: Awake, oriented to place, time and person  HEENT: No head lesions, PERRL, EOMI, mouth without lesions, no nasal lesions, no cervical adenopathy palpated  Respiratory: Lungs with equal breath sounds bilaterally, no adventitious sounds auscultated, no accessory muscle use  CV: Regular rate, no murmurs, no JVD, no leg edema  Abdomen: Soft, non tender, + bowel sounds, no lesions  Skin: Hydrated, adequate turgor, no rash, capillary refill <2 seconds  Extremities: Muscular strength 5/5 in 4 limbs, moves 4 limbs spontaneously, distal pulses present  Neurology: Awake and alert, follows commands, moves 4 limbs on command and spontaneously, neck is supple, no meningitic signs present. Due to clinical improvement, we will not follow daily but PRN  Please call intensivist on call if the patient's condition were to worsen. Thank you for allowing us to participate in the care of Mr Genaro Coto.        MEDICATIONS:   busPIRone  10 mg Oral BID    predniSONE  40 mg Oral Daily with breakfast    pantoprazole  40 mg Oral QAM AC    tigecycline (TYGACIL) IVPB  50 mg IntraVENous Q12H    enoxaparin  40 mg SubCUTAneous Daily    atorvastatin  20 mg Oral Daily    Vitamin D  2,000 Units Oral Daily    folic acid  1 mg Oral Daily    traZODone  100 mg Oral Nightly    budesonide  0.5 mg Nebulization BID    Arformoterol Tartrate  15 mcg Nebulization BID    cefTRIAXone (ROCEPHIN) IV  2,000 mg IntraVENous Q24H    acetylcysteine  600 mg Inhalation TID       albuterol, hydrALAZINE, magnesium sulfate, sodium phosphate IVPB **OR** sodium phosphate IVPB, potassium chloride **OR** potassium alternative oral replacement **OR** potassium chloride, acetaminophen    OBJECTIVE:  Vitals:    11/08/22 0730   BP: 108/80   Pulse: 69   Resp: 17   Temp: 97.3 °F (36.3 °C)   SpO2: 96%           O2 Device: None (Room air)        LABS:  WBC   Date Value Ref Range Status   11/08/2022 14.2 (H) 4.5 - 11.5 E9/L Final   11/07/2022 16.8 (H) 4.5 - 11.5 E9/L Final   11/06/2022 11.9 (H) 4.5 - 11.5 E9/L Final     Hemoglobin   Date Value Ref Range Status   11/08/2022 11.6 (L) 12.5 - 16.5 g/dL Final   11/07/2022 11.9 (L) 12.5 - 16.5 g/dL Final   11/06/2022 12.1 (L) 12.5 - 16.5 g/dL Final     Hematocrit   Date Value Ref Range Status   11/08/2022 35.0 (L) 37.0 - 54.0 % Final   11/07/2022 36.0 (L) 37.0 - 54.0 % Final   11/06/2022 37.0 37.0 - 54.0 % Final     MCV   Date Value Ref Range Status   11/08/2022 92.6 80.0 - 99.9 fL Final   11/07/2022 92.3 80.0 - 99.9 fL Final   11/06/2022 90.2 80.0 - 99.9 fL Final     Platelets   Date Value Ref Range Status   11/08/2022 624 (H) 130 - 450 E9/L Final   11/07/2022 658 (H) 130 - 450 E9/L Final   11/06/2022 593 (H) 130 - 450 E9/L Final     Sodium   Date Value Ref Range Status   11/08/2022 136 132 - 146 mmol/L Final   11/07/2022 139 132 - 146 mmol/L Final   11/06/2022 137 132 - 146 mmol/L Final     Potassium   Date Value Ref Range Status   11/08/2022 4.3 3.5 - 5.0 mmol/L Final   11/07/2022 4.6 3.5 - 5.0 mmol/L Final   11/06/2022 4.2 3.5 - 5.0 mmol/L Final     Potassium reflex Magnesium   Date Value Ref Range Status   03/03/2018 4.6 3.5 - 5.0 mmol/L Final     Comment:     Specimen is moderately Hemolyzed. Result may be artificially increased.      Chloride   Date Value Ref Range Status   11/08/2022 105 98 - 107 mmol/L Final   11/07/2022 107 98 - 107 mmol/L Final   11/06/2022 104 98 - 107 mmol/L Final     CO2   Date Value Ref Range Status   11/08/2022 23 22 - 29 mmol/L Final   11/07/2022 21 (L) 22 - 29 mmol/L Final   11/06/2022 22 22 - 29 mmol/L Final     BUN   Date Value Ref Range Status   11/08/2022 19 6 - 20 mg/dL Final   11/07/2022 19 6 - 20 mg/dL Final   11/06/2022 15 6 - 20 mg/dL Final     Creatinine   Date Value Ref Range Status   11/08/2022 0.8 0.7 - 1.2 mg/dL Final   11/07/2022 0.7 0.7 - 1.2 mg/dL Final   11/06/2022 0.6 (L) 0.7 - 1.2 mg/dL Final     Glucose   Date Value Ref Range Status   11/08/2022 92 74 - 99 mg/dL Final   11/07/2022 135 (H) 74 - 99 mg/dL Final   11/06/2022 153 (H) 74 - 99 mg/dL Final   10/14/2022 91 70 - 99 mg/dL Final     Calcium   Date Value Ref Range Status   11/08/2022 8.2 (L) 8.6 - 10.2 mg/dL Final   11/07/2022 8.5 (L) 8.6 - 10.2 mg/dL Final   11/06/2022 9.0 8.6 - 10.2 mg/dL Final     Total Protein   Date Value Ref Range Status   11/08/2022 5.7 (L) 6.4 - 8.3 g/dL Final   11/07/2022 6.1 (L) 6.4 - 8.3 g/dL Final   11/06/2022 6.5 6.4 - 8.3 g/dL Final     Albumin   Date Value Ref Range Status 11/08/2022 2.7 (L) 3.5 - 5.2 g/dL Final   11/07/2022 2.8 (L) 3.5 - 5.2 g/dL Final   11/06/2022 2.9 (L) 3.5 - 5.2 g/dL Final     Total Bilirubin   Date Value Ref Range Status   11/08/2022 0.3 0.0 - 1.2 mg/dL Final   11/07/2022 0.3 0.0 - 1.2 mg/dL Final   11/06/2022 0.3 0.0 - 1.2 mg/dL Final     Alkaline Phosphatase   Date Value Ref Range Status   11/08/2022 63 40 - 129 U/L Final   11/07/2022 69 40 - 129 U/L Final   11/06/2022 77 40 - 129 U/L Final     AST   Date Value Ref Range Status   11/08/2022 22 0 - 39 U/L Final   11/07/2022 50 (H) 0 - 39 U/L Final   11/06/2022 32 0 - 39 U/L Final     Comment:     Specimen is slightly Hemolyzed. Result may be artificially increased. ALT   Date Value Ref Range Status   11/08/2022 47 (H) 0 - 40 U/L Final   11/07/2022 61 (H) 0 - 40 U/L Final   11/06/2022 43 (H) 0 - 40 U/L Final     Est, Glom Filt Rate   Date Value Ref Range Status   11/08/2022 >60 >=60 mL/min/1.73 Final     Comment:     Pediatric calculator link  [employer].at. [email or web address]/gfr_calculatorped  Effective Oct 3, 2022  These results are not intended for use in patients  <25years of age. eGFR results are calculated without  a race factor using the 2021 CKD-EPI equation. Careful  clinical correlation is recommended, particularly when  comparing to results calculated using previous equations. The CKD-EPI equation is less accurate in patients with  extremes of muscle mass, extra-renal metabolism of  creatinine, excessive creatinine ingestion, or following  therapy that affects renal tubular secretion. 11/07/2022 >60 >=60 mL/min/1.73 Final     Comment:     Pediatric calculator link  [email or web address]/gfr_calculatorped  Effective Oct 3, 2022  These results are not intended for use in patients  <25years of age. eGFR results are calculated without  a race factor using the 2021 CKD-EPI equation.   Careful  clinical correlation is recommended, particularly when  comparing to results calculated using previous equations. The CKD-EPI equation is less accurate in patients with  extremes of muscle mass, extra-renal metabolism of  creatinine, excessive creatinine ingestion, or following  therapy that affects renal tubular secretion. 11/06/2022 >60 >=60 mL/min/1.73 Final     Comment:     Pediatric calculator link  [employer].[email or web address]. [email or web address]/gfr_calculatorped  Effective Oct 3, 2022  These results are not intended for use in patients  <25years of age. eGFR results are calculated without  a race factor using the 2021 CKD-EPI equation. Careful  clinical correlation is recommended, particularly when  comparing to results calculated using previous equations. The CKD-EPI equation is less accurate in patients with  extremes of muscle mass, extra-renal metabolism of  creatinine, excessive creatinine ingestion, or following  therapy that affects renal tubular secretion. GFR    Date Value Ref Range Status   04/11/2022 >60  Final   10/04/2021 >60  Final   04/06/2021 >60  Final     Magnesium   Date Value Ref Range Status   11/08/2022 2.5 1.6 - 2.6 mg/dL Final   11/07/2022 2.3 1.6 - 2.6 mg/dL Final   11/06/2022 2.2 1.6 - 2.6 mg/dL Final     Phosphorus   Date Value Ref Range Status   11/08/2022 3.6 2.5 - 4.5 mg/dL Final   11/07/2022 4.0 2.5 - 4.5 mg/dL Final   11/06/2022 3.6 2.5 - 4.5 mg/dL Final     No results for input(s): PH, PO2, PCO2, HCO3, BE, O2SAT in the last 72 hours. RADIOLOGY:  CT ABDOMEN PELVIS W IV CONTRAST Additional Contrast? Oral   Final Result   1. Left lung findings, as described above. There are discussed in full   detail on the report from the patient's CT scan of the chest performed the   same day. Please refer to that transcription. 2.  No acute abdominal or pelvic pathology. 3.  Small amount of fluid in the ascending colon, which can be seen in   diarrheal disease.          CT CHEST W CONTRAST   Final Result   8.6 cm infiltrate of the superior segment left lower lobe with associated   mild pleural thickening up to 13 mm. There is no adenopathy. Taken   together, the findings suggest infectious process such as pulmonary abscess   and developing empyema over malignancy. However, careful clinical   correlation and imaging follow-up until resolution is required in order to   exclude malignancy. RECOMMENDATIONS:   Careful clinical correlation and follow up recommended. XR CHEST (2 VW)   Final Result   Dense triangular-shaped infiltrate in the superior segment left lower lobe   with left hilar fullness suspicious for malignancy. RECOMMENDATION:   I recommend CT scan of the chest and mediastinum with IV contrast media.                    Chelle Frank MD  Pulmonary & Critical Care  11/8/2022 4:38 PM

## 2022-11-08 NOTE — PROGRESS NOTES
Caren Sawant  MR:  [id number]  :   1965  Admit Date:  2022      This is a face to face encounter with Marcin Perdue 61 y.o. male on 22  Elements of this note, including Diagnosis,  Interval History, Past Medical/Surgical/Family/Social Histories, ROS, physical exam, and Assessment and Plan were copied and pasted from Previous. Updates have been made where noted and reflect current exam and medical decision making from the DOS of this encounter. CHIEF COMPLAINT     ID following for   Chief Complaint   Patient presents with    Fever     CHILLS/ SICK AFTER FLU SHOT/ HAD FEVER ON OCT 25/ BURNING UP THIS AM BUT DID NOT TAKE TEMP/ HAD APPT WITH DR SAMUELS TO SICK     HISTORY OF PRESENT ILLNESS   The patient is a 61 y.o. man not known to the Infectious Diseases service. The patient is admitted through the emergency room with fevers chills shortness of breath with productive cough. Patient has generalized malaise as well as diffuse myalgias and headaches. In the emergency room his white count was 16,000 hemoglobin 12.7 with LFTs of 52 and C-reactive protein is 13.4. His BUN and creatinine was 5 and 0.9. His albumin was only 3.4 and his respiratory viral panel was nondiagnostic urine for Legionella and strep pneumo are pending respiratory culture was sent. Empirically he was started on Merrem, Vibramycin, Solu-Medrol, vancomycin. He claims to have allergies or reactions with a rash to quinolones, hives with penicillin and shortness of breath with clindamycin. Patient recently got a flu shot back 2 weeks prior to this admission. The patient admits to taking trazodone and Benadryl to help him sleep. He also admitted to snort cocaine intermittently last snorting session was in October      Assessment & Plan   Abscess of lower lobe of left lung with pneumonia ([facility] [address] [J85.1]  left lung  Aspiration  Fevers  Leukocytosis  transaminitis  Allergies -pcn/cipro/clinda      Plan:    On solu-medrol Continue tygacil   Continue Rocephin   Pulmonary critical care following- for Bronch today? If not bronch to be done- would recommend repeat CT scan   Monitor labs- watch LFTs- better today- WBC- 14.2   Check cultures     Pt seen and examined  DOS  11/08/22  In bed on RA  Has no c/o f/c/n/v/d  No fevers. Cough better  Awaiting Bronch later today       Patient is tolerating medications. No reported adverse drug reactions. REVIEW OF SYSTEMS     As stated above in the chief complaint, otherwise negative.   CURRENT MEDICATIONS     Current Facility-Administered Medications:     busPIRone (BUSPAR) tablet 10 mg, 10 mg, Oral, BID, Viri Diver, APRN - CNP    predniSONE (DELTASONE) tablet 40 mg, 40 mg, Oral, Daily with breakfast, Viri Diver, APRN - CNP, 40 mg at 11/07/22 1033    pantoprazole (PROTONIX) tablet 40 mg, 40 mg, Oral, QAM AC, Viri Diver, APRN - CNP    tigecycline (TYGACIL) 50 mg, [id number] in sodium chloride 0.9 % 100 mL IVPB, 50 mg, IntraVENous, Q12H, Elaine Thorne MD, Stopped at 11/08/22 0713    enoxaparin (LOVENOX) injection 40 mg, 40 mg, SubCUTAneous, Daily, Lillian Banda MD, 40 mg at 11/07/22 0818    atorvastatin (LIPITOR) tablet 20 mg, 20 mg, Oral, Daily, Viri Diver, APRN - CNP, 20 mg at 11/07/22 0818    Vitamin D (CHOLECALCIFEROL) tablet 2,000 Units, 2,000 Units, Oral, Daily, Viri Diver, APRN - CNP, 2,000 Units at 34/32/82 9031    folic acid (FOLVITE) tablet 1 mg, 1 mg, Oral, Daily, Viri Diver, APRN - CNP, 1 mg at 11/07/22 0818    traZODone (DESYREL) tablet 100 mg, 100 mg, Oral, Nightly, Viri Diver, APRN - CNP, 100 mg at 11/05/22 2319    albuterol (PROVENTIL) nebulizer solution 2.5 mg, 2.5 mg, Nebulization, Q4H PRN, Viri Diver, APRN - CNP, 2.5 mg at 11/07/22 1722    budesonide (PULMICORT) nebulizer suspension 500 mcg, 0.5 mg, Nebulization, BID, Viri Diver, APRN - CNP, 500 mcg at 11/08/22 0604    hydrALAZINE (APRESOLINE) injection 5 mg, 5 mg, IntraVENous, Q4H PRN, Ashia Pu, APRN - CNP    magnesium sulfate 1000 mg in dextrose 5% 100 mL IVPB, 1,000 mg, IntraVENous, PRN, Ashia Pu, APRN - CNP    sodium phosphate 15.6 mmol in sodium chloride 0.9 % 250 mL IVPB, 0.16 mmol/kg, IntraVENous, PRN **OR** sodium phosphate 31.2 mmol in sodium chloride 0.9 % 500 mL IVPB, 0.32 mmol/kg, IntraVENous, PRN, Ashia Pu, APRN - CNP    potassium chloride (KLOR-CON M) extended release tablet 40 mEq, 40 mEq, Oral, PRN **OR** potassium bicarb-citric acid (EFFER-K) effervescent tablet 40 mEq, 40 mEq, Oral, PRN **OR** potassium chloride 10 mEq/100 mL IVPB (Peripheral Line), 10 mEq, IntraVENous, PRN, Ashia Pu, APRN - CNP    Arformoterol Tartrate (BROVANA) nebulizer solution 15 mcg, 15 mcg, Nebulization, BID, Asiha Pu, APRN - CNP, 15 mcg at 22 8782    acetaminophen (TYLENOL) tablet 650 mg, 650 mg, Oral, Q6H PRN, Arin Manriquez DO, 650 mg at 22 2474    cefTRIAXone (ROCEPHIN) 2,000 mg in sterile water 20 mL IV syringe, 2,000 mg, IntraVENous, Q24H, Walt Monson MD, 2,000 mg at 22 1312    acetylcysteine (MUCOMYST) 20 % solution 600 mg, 600 mg, Inhalation, TID, Birdie Montiel MD, 600 mg at 22 0606  PHYSICAL EXAM     /80   Pulse 69   Temp 97.3 °F (36.3 °C) (Oral)   Resp 17   Ht 6' (1.829 m)   Wt 215 lb (97.5 kg)   SpO2 96%   BMI 29.16 kg/m²   Temp  Av.3 °F (36.3 °C)  Min: 97.3 °F (36.3 °C)  Max: 97.3 °F (36.3 °C)   CONSTITUTIONAL:  no apparent distress, and appears stated age  ENT:  Normocephalic, atraumatic, sinuses nontender,     LUNGS:  No increased work of breathing,dec to auscultation bilaterally, no crackles or wheezing. Diminished to left base. On room air. CARDIOVASCULAR:  Normal apical impulse, regular rate and rhythm, normal S1 and S2, no S3 or S4, and no murmur noted  ABDOMEN:  No scars, normal bowel sounds, soft, non-distended, non-tender  MUSCULOSKELETAL:  There is no redness, warmth, or swelling of the joints.   Full range of motion noted. NEUROLOGIC:  Awake, alert, oriented. Cranial nerves II-XII are grossly intact. SKIN:  normal skin color, texture, turgor and no rashes  Lines:  PIV        Peripheral Intravenous Line:  Peripheral IV 11/04/22 Right Antecubital (Active)   Site Assessment Clean, dry & intact 11/04/22 2109   Line Status Blood return noted 11/04/22 2109   Phlebitis Assessment No symptoms 11/04/22 2109   Infiltration Assessment 0 11/04/22 2109       Peripheral IV 11/05/22 Left Forearm (Active)     DIAGNOSTIC RESULTS   Radiology:    Recent Labs     11/06/22 0541 11/07/22 0618 11/08/22 0658   WBC 11.9* 16.8* 14.2*   RBC 4.10 3.90 3.78*   HGB 12.1* 11.9* 11.6*   HCT 37.0 36.0* 35.0*   MCV 90.2 92.3 92.6   MCH 29.5 30.5 30.7   MCHC 32.7 33.1 33.1   RDW 13.1 13.7 13.9   * 658* 624*   MPV 8.7 8.5 8.2       Recent Labs     11/06/22  0541 11/07/22 0618 11/08/22 0658    139 136   K 4.2 4.6 4.3    107 105   CO2 22 21* 23   BUN 15 19 19   CREATININE 0.6* 0.7 0.8   GLUCOSE 153* 135* 92   PROT 6.5 6.1* 5.7*   LABALBU 2.9* 2.8* 2.7*   CALCIUM 9.0 8.5* 8.2*   BILITOT 0.3 0.3 0.3   ALKPHOS 77 69 63   AST 32 50* 22   ALT 43* 61* 47*       Lab Results   Component Value Date    CRP 13.4 (H) 11/05/2022     Lab Results   Component Value Date    SEDRATE 95 (H) 11/05/2022     Recent Labs     11/06/22  0541 11/07/22 0618 11/08/22 0658   AST 32 50* 22   ALT 43* 61* 47*   TRIG 75  --   --        Lab Results   Component Value Date/Time    CHOL 148 11/06/2022 05:41 AM    TRIG 75 11/06/2022 05:41 AM    HDL 20 11/06/2022 05:41 AM    LDLCALC 113 11/06/2022 05:41 AM    LABVLDL 15 11/06/2022 05:41 AM     Lab Results   Component Value Date/Time    VITD25 39 10/14/2022 10:09 AM       Microbiology:   No results for input(s): COVID19 in the last 72 hours.     Lab Results   Component Value Date/Time    BC 24 Hours no growth 11/05/2022 07:25 AM    BC 24 Hours no growth 11/05/2022 12:28 AM    BC 24 Hours no growth 11/05/2022 12:28 AM    BLOODCULT2 24 Hours no growth 11/05/2022 07:25 AM    BLOODCULT2 5 Days- no growth 03/02/2018 04:26 AM    BLOODCULT2 5 Days- no growth 07/13/2015 11:57 AM        Imaging and labs were reviewed per medical records. POC was discussed with Marcin Perdue. The patient was educated about the diagnosis, indications, risks and benefits of treatment. An opportunity to ask questions was given to the patient/FAMILY. Thank you for involving me in the care of Marcin Perdue I will continue to follow. Please do not hesitate to call [phone] for any questions or concerns. Electronically signed by CAMILA Castellon on 11/8/2022 at 12:16 PM      As above           This is a face to face encounter with Abhishek Moore on 11/08/22. I discussed the findings and plans with  NURSE PRACTITIONER, CAMILA Castellon and agree as documented in her note. See below for additional details and treatment plan. Abhishek Moore is a 61 y.o. male who presents with   has a past medical history of COPD (chronic obstructive pulmonary disease) ([address] Depression, and Hyperlipidemia. ADMITTED FOR   Abscess of lower lobe of left lung with pneumonia (HCC) [J85.1]    AWAITING PULM FOR BRONCH?  CONT ATBX  WILL DO F/U CT  IF NO BRONCH      cefTRIAXone (ROCEPHIN) 2,000 mg in sterile water 20 mL IV syringe, Q24H     PROB D/C WITH AUGMENTIN    Imaging and labs were reviewed. Abhishek Moore was informed of their diagnosis, indications, risks and benefits of treatment. Abhishek Moore had the opportunity to ask questions. All questions were answered. Thank you for involving me in the care of Abhishek Moore. Please do not hesitate to call for any questions or concerns.     Electronically signed by Lawson Khan MD on 11/8/2022 at 4:23 PM

## 2022-11-09 ENCOUNTER — APPOINTMENT (OUTPATIENT)
Dept: CT IMAGING | Age: 57
DRG: 871 | End: 2022-11-09
Payer: MEDICARE

## 2022-11-09 VITALS
DIASTOLIC BLOOD PRESSURE: 63 MMHG | BODY MASS INDEX: 29.12 KG/M2 | HEIGHT: 72 IN | OXYGEN SATURATION: 98 % | HEART RATE: 65 BPM | SYSTOLIC BLOOD PRESSURE: 98 MMHG | WEIGHT: 215 LBS | TEMPERATURE: 97.6 F | RESPIRATION RATE: 18 BRPM

## 2022-11-09 LAB
ALBUMIN SERPL-MCNC: 2.8 G/DL (ref 3.5–5.2)
ALP BLD-CCNC: 72 U/L (ref 40–129)
ALT SERPL-CCNC: 38 U/L (ref 0–40)
ANION GAP SERPL CALCULATED.3IONS-SCNC: 9 MMOL/L (ref 7–16)
AST SERPL-CCNC: 13 U/L (ref 0–39)
BASOPHILS ABSOLUTE: 0.01 E9/L (ref 0–0.2)
BASOPHILS RELATIVE PERCENT: 0.1 % (ref 0–2)
BILIRUB SERPL-MCNC: 0.3 MG/DL (ref 0–1.2)
BILIRUBIN DIRECT: <0.2 MG/DL (ref 0–0.3)
BILIRUBIN, INDIRECT: ABNORMAL MG/DL (ref 0–1)
BUN BLDV-MCNC: 19 MG/DL (ref 6–20)
CALCIUM SERPL-MCNC: 8.3 MG/DL (ref 8.6–10.2)
CHLORIDE BLD-SCNC: 102 MMOL/L (ref 98–107)
CO2: 22 MMOL/L (ref 22–29)
CREAT SERPL-MCNC: 0.8 MG/DL (ref 0.7–1.2)
EOSINOPHILS ABSOLUTE: 0.02 E9/L (ref 0.05–0.5)
EOSINOPHILS RELATIVE PERCENT: 0.1 % (ref 0–6)
GFR SERPL CREATININE-BSD FRML MDRD: >60 ML/MIN/1.73
GLUCOSE BLD-MCNC: 106 MG/DL (ref 74–99)
HCT VFR BLD CALC: 38.9 % (ref 37–54)
HEMOGLOBIN: 12.5 G/DL (ref 12.5–16.5)
IMMATURE GRANULOCYTES #: 0.19 E9/L
IMMATURE GRANULOCYTES %: 1.3 % (ref 0–5)
LYMPHOCYTES ABSOLUTE: 2.83 E9/L (ref 1.5–4)
LYMPHOCYTES RELATIVE PERCENT: 19.9 % (ref 20–42)
MAGNESIUM: 2.5 MG/DL (ref 1.6–2.6)
MCH RBC QN AUTO: 29.6 PG (ref 26–35)
MCHC RBC AUTO-ENTMCNC: 32.1 % (ref 32–34.5)
MCV RBC AUTO: 92.2 FL (ref 80–99.9)
MONOCYTES ABSOLUTE: 0.92 E9/L (ref 0.1–0.95)
MONOCYTES RELATIVE PERCENT: 6.5 % (ref 2–12)
NEUTROPHILS ABSOLUTE: 10.25 E9/L (ref 1.8–7.3)
NEUTROPHILS RELATIVE PERCENT: 72.1 % (ref 43–80)
PDW BLD-RTO: 13.8 FL (ref 11.5–15)
PHOSPHORUS: 4.1 MG/DL (ref 2.5–4.5)
PLATELET # BLD: 726 E9/L (ref 130–450)
PMV BLD AUTO: 8.1 FL (ref 7–12)
POTASSIUM SERPL-SCNC: 4.3 MMOL/L (ref 3.5–5)
RBC # BLD: 4.22 E12/L (ref 3.8–5.8)
SODIUM BLD-SCNC: 133 MMOL/L (ref 132–146)
TOTAL PROTEIN: 5.9 G/DL (ref 6.4–8.3)
WBC # BLD: 14.2 E9/L (ref 4.5–11.5)

## 2022-11-09 PROCEDURE — 84100 ASSAY OF PHOSPHORUS: CPT

## 2022-11-09 PROCEDURE — 80048 BASIC METABOLIC PNL TOTAL CA: CPT

## 2022-11-09 PROCEDURE — 80076 HEPATIC FUNCTION PANEL: CPT

## 2022-11-09 PROCEDURE — 6370000000 HC RX 637 (ALT 250 FOR IP): Performed by: NURSE PRACTITIONER

## 2022-11-09 PROCEDURE — 6360000004 HC RX CONTRAST MEDICATION: Performed by: RADIOLOGY

## 2022-11-09 PROCEDURE — 6360000002 HC RX W HCPCS: Performed by: SPECIALIST

## 2022-11-09 PROCEDURE — 6360000002 HC RX W HCPCS: Performed by: INTERNAL MEDICINE

## 2022-11-09 PROCEDURE — 94640 AIRWAY INHALATION TREATMENT: CPT

## 2022-11-09 PROCEDURE — 83735 ASSAY OF MAGNESIUM: CPT

## 2022-11-09 PROCEDURE — 71260 CT THORAX DX C+: CPT

## 2022-11-09 PROCEDURE — 97530 THERAPEUTIC ACTIVITIES: CPT | Performed by: PHYSICAL THERAPIST

## 2022-11-09 PROCEDURE — 2580000003 HC RX 258: Performed by: SPECIALIST

## 2022-11-09 PROCEDURE — 85025 COMPLETE CBC W/AUTO DIFF WBC: CPT

## 2022-11-09 PROCEDURE — 6360000002 HC RX W HCPCS: Performed by: NURSE PRACTITIONER

## 2022-11-09 PROCEDURE — 36415 COLL VENOUS BLD VENIPUNCTURE: CPT

## 2022-11-09 RX ORDER — AMOXICILLIN AND CLAVULANATE POTASSIUM 562.5; 437.5; 62.5 MG/1; MG/1; MG/1
2 TABLET, FILM COATED, EXTENDED RELEASE ORAL EVERY 12 HOURS SCHEDULED
Qty: 56 TABLET | Refills: 0 | Status: SHIPPED | OUTPATIENT
Start: 2022-11-09 | End: 2022-11-22 | Stop reason: SDUPTHER

## 2022-11-09 RX ORDER — AMOXICILLIN AND CLAVULANATE POTASSIUM 562.5; 437.5; 62.5 MG/1; MG/1; MG/1
2 TABLET, FILM COATED, EXTENDED RELEASE ORAL EVERY 12 HOURS SCHEDULED
Status: DISCONTINUED | OUTPATIENT
Start: 2022-11-09 | End: 2022-11-09 | Stop reason: HOSPADM

## 2022-11-09 RX ORDER — DOXYCYCLINE HYCLATE 100 MG
100 TABLET ORAL EVERY 12 HOURS SCHEDULED
Qty: 28 TABLET | Refills: 0 | Status: SHIPPED | OUTPATIENT
Start: 2022-11-09 | End: 2022-11-22 | Stop reason: SDUPTHER

## 2022-11-09 RX ORDER — DOXYCYCLINE HYCLATE 100 MG/1
100 CAPSULE ORAL EVERY 12 HOURS SCHEDULED
Status: DISCONTINUED | OUTPATIENT
Start: 2022-11-09 | End: 2022-11-09 | Stop reason: HOSPADM

## 2022-11-09 RX ORDER — PREDNISONE 10 MG/1
TABLET ORAL
Qty: 30 TABLET | Refills: 0 | Status: SHIPPED | OUTPATIENT
Start: 2022-11-09

## 2022-11-09 RX ADMIN — Medication 2000 UNITS: at 08:36

## 2022-11-09 RX ADMIN — BUDESONIDE 500 MCG: 0.5 SUSPENSION RESPIRATORY (INHALATION) at 06:21

## 2022-11-09 RX ADMIN — SODIUM CHLORIDE 50 MG: 9 INJECTION, SOLUTION INTRAVENOUS at 05:44

## 2022-11-09 RX ADMIN — FOLIC ACID 1 MG: 1 TABLET ORAL at 08:36

## 2022-11-09 RX ADMIN — ENOXAPARIN SODIUM 40 MG: 100 INJECTION SUBCUTANEOUS at 08:36

## 2022-11-09 RX ADMIN — ATORVASTATIN CALCIUM 20 MG: 20 TABLET, FILM COATED ORAL at 08:36

## 2022-11-09 RX ADMIN — PANTOPRAZOLE SODIUM 40 MG: 40 TABLET, DELAYED RELEASE ORAL at 05:44

## 2022-11-09 RX ADMIN — BUSPIRONE HYDROCHLORIDE 10 MG: 10 TABLET ORAL at 08:36

## 2022-11-09 RX ADMIN — IOPAMIDOL 75 ML: 755 INJECTION, SOLUTION INTRAVENOUS at 11:35

## 2022-11-09 RX ADMIN — PREDNISONE 40 MG: 20 TABLET ORAL at 08:36

## 2022-11-09 RX ADMIN — ARFORMOTEROL TARTRATE 15 MCG: 15 SOLUTION RESPIRATORY (INHALATION) at 06:21

## 2022-11-09 NOTE — DISCHARGE SUMMARY
Internal Medicine Progress Note     [facility]     Gabriella Rowell. Andrés Emmanuel., F.A.C.O.I. Michael Richey D.O., REAGANONAHOMI Johnston D.O. Sophia Shepard, MSN, APRN, NP-C  Keith Willingham. Marisa Davis, MSN, APRN-CNP       Internal Medicine  Discharge Summary    NAME: Raj Crooks  :  1965  MRN:  [id number]  PCP:Vick Galindo DO  ADMITTED: 2022      DISCHARGED: 22    ADMITTING PHYSICIAN: Belle Gonzales DO    CONSULTANT(S):   IP CONSULT TO INFECTIOUS DISEASES  IP CONSULT TO PULMONOLOGY  IP CONSULT TO SOCIAL WORK     ADMITTING DIAGNOSIS:   Abscess of lower lobe of left lung with pneumonia ([facility] [address] [J85.1]     DISCHARGE DIAGNOSES:   Sepsis secondary to pulmonary abscess versus empyema with strong clinical suspicion for aspiration  Acute exacerbation of COPD complicated by ongoing tobacco abuse  Mild dehydration secondary to nausea, poor intake and diarrhea  Impaired fasting glucose  Gastroesophageal reflux disease  Hyperlipidemia  Bipolar Depression and anxiety previously on Vraylar, Effexor, Vistaril, BuSpar and trazodone  Intermittent insufflation of cocaine likely contributing to aspiration    BRIEF HISTORY OF PRESENT ILLNESS:   Raj Crooks is a 65-year-old male patient presented to [address] [place] [address] emergency department with multiple days worth of fever and chills. Both he and his 66-year-old roommate have been sick with similar symptoms. He was evaluated in the ER where a metabolic panel did not reveal any significant abnormalities or electrolyte disturbance. Renal function was intact. High-sensitivity troponin x1 was not elevated nor was CK. LFTs showed very mild transaminitis but were otherwise unremarkable. CBC revealed leukocytosis with relative neutrophilic predominance but with total neutrophilic percentage being within normal range. Rapid COVID and influenza testing returned negative. Urinalysis was unremarkable.   CTA of the chest was obtained and revealed an 8.6 cm infiltrate of the superior segment of the left lower lobe with pleural thickening up to 13 mm, without adenopathy suggestive of inflammatory process such as pulmonary abscess or empyema versus malignancy based upon the patient's smoking history. He met criteria for sepsis based upon leukocytosis and tachycardia on arrival.  Case was discussed with ER physician with plans for admission to the service of Dr. Rebekah Arana for further care and management.     LABS[de-identified]  Lab Results   Component Value Date    WBC 14.2 (H) 11/09/2022    HGB 12.5 11/09/2022    HCT 38.9 11/09/2022     (H) 11/09/2022     11/09/2022    K 4.3 11/09/2022     11/09/2022    CREATININE 0.8 11/09/2022    BUN 19 11/09/2022    CO2 22 11/09/2022    GLUCOSE 106 (H) 11/09/2022    ALT 38 11/09/2022    AST 13 11/09/2022    INR 1.0 07/14/2015     Lab Results   Component Value Date    INR 1.0 07/14/2015    INR 1.0 07/04/2015    PROTIME 11.8 07/14/2015    PROTIME 11.7 07/04/2015      Lab Results   Component Value Date    TSH 0.733 11/06/2022     Lab Results   Component Value Date    TRIG 75 11/06/2022    TRIG 96 10/14/2022    TRIG 171 (H) 04/11/2022     Lab Results   Component Value Date    HDL 20 11/06/2022    HDL 35 (L) 10/14/2022    HDL 30 04/11/2022     Lab Results   Component Value Date    LDLCALC 113 (H) 11/06/2022    LDLCALC 93 10/14/2022    LDLCALC 51 04/11/2022     Lab Results   Component Value Date    LABA1C 5.7 (H) 11/06/2022       IMAGING:  XR CHEST (2 VW)    Result Date: 11/4/2022  EXAMINATION: TWO XRAY VIEWS OF THE CHEST 11/4/2022 7:42 pm COMPARISON: CT chest 10/20/2021 HISTORY: ORDERING SYSTEM PROVIDED HISTORY: fever, short of breath TECHNOLOGIST PROVIDED HISTORY: Reason for exam:->fever, short of breath FINDINGS: PA and left lateral views of the chest demonstrate a dense triangular-shaped infiltrate arising from the superior segment left lower lobe with accompanied left hilar fullness suspicious for adenopathy. The right lung is clear with no mass lesions. There is mild blunting of the left costophrenic angle. There is no evidence of a pneumothorax. Dense triangular-shaped infiltrate in the superior segment left lower lobe with left hilar fullness suspicious for malignancy. RECOMMENDATION: I recommend CT scan of the chest and mediastinum with IV contrast media. CT CHEST W CONTRAST    Result Date: 11/4/2022  EXAMINATION: CT OF THE CHEST WITH CONTRAST 11/4/2022 10:18 pm TECHNIQUE: CT of the chest was performed with the administration of intravenous contrast. Multiplanar reformatted images are provided for review. Automated exposure control, iterative reconstruction, and/or weight based adjustment of the mA/kV was utilized to reduce the radiation dose to as low as reasonably achievable. COMPARISON: October 20, 2021. CT lung screening HISTORY: ORDERING SYSTEM PROVIDED HISTORY: lung mass on CXR TECHNOLOGIST PROVIDED HISTORY: Reason for exam:->lung mass on CXR Decision Support Exception - unselect if not a suspected or confirmed emergency medical condition->Emergency Medical Condition (MA) FINDINGS: Mediastinum: No mediastinal adenopathy. Lungs/pleura: 8.6 cm irregularly enhancing infiltrate of the superior segment left lower lobe. Diffuse interstitial thickening through the remainder of the left lower lobe. Associated irregular pleural thickening measuring up to 13 mm in thickness in the posterior left pleural space, possibly reactive or a developing empyema. .  No mass lesion was present on the prior screening examination in this region. Upper Abdomen: Cholecystectomy. Otherwise, unremarkable. Soft Tissues/Bones: No aggressive or suspicious osseous lesion. 8.6 cm infiltrate of the superior segment left lower lobe with associated mild pleural thickening up to 13 mm. There is no adenopathy.   Taken together, the findings suggest infectious process such as pulmonary abscess and developing empyema over malignancy. However, careful clinical correlation and imaging follow-up until resolution is required in order to exclude malignancy. RECOMMENDATIONS: Careful clinical correlation and follow up recommended. CT ABDOMEN PELVIS W IV CONTRAST Additional Contrast? Oral    Result Date: 11/5/2022  EXAMINATION: CT OF THE ABDOMEN AND PELVIS WITH CONTRAST 11/5/2022 8:57 am TECHNIQUE: CT of the abdomen and pelvis was performed with the administration of intravenous contrast. Multiplanar reformatted images are provided for review. Automated exposure control, iterative reconstruction, and/or weight based adjustment of the mA/kV was utilized to reduce the radiation dose to as low as reasonably achievable. COMPARISON: Correlation is made with CT scan of the chest performed the same day. HISTORY: ORDERING SYSTEM PROVIDED HISTORY: Abnormal appearance of the lung on CT chest imaging concerning for malignancy given the patient's heavy smoking history, metastatic  PROVIDED HISTORY: Additional Contrast?->Oral Reason for exam:->Abnormal appearance of the lung on CT chest imaging concerning for malignancy given the patient's heavy smoking history, metastatic survey FINDINGS: Lower Chest: A partially seen small left pleural effusion is noted. Ill-defined opacities are identified in the left lower lobe. These findings are discussed in full detail on the report from the CT scan of the chest. Please refer to that transcription. Mild dependent right lower lobe atelectasis is identified. Organs: The liver, spleen, biliary tree, pancreas, adrenal glands, and kidneys are unremarkable. The patient is again status post cholecystectomy. GI/Bowel: The stomach is suboptimally distended, but grossly unremarkable. The small bowel is normal in appearance. Sigmoid colon diverticula are identified. There is a small amount of fluid seen within the ascending colon. This can be seen in diarrheal disease.   The appendix is not visualized. Pelvis: The urinary bladder is adequately distended and unremarkable. The seminal vesicles are symmetric in size. The prostate gland is normal in size and contains a tiny calcification. Peritoneum/Retroperitoneum: No retroperitoneal or pelvic lymphadenopathy is identified. No free fluid is seen in the abdomen and pelvis. No abdominal or pelvic soft tissue mass is appreciated. The abdominal aorta is minimally atherosclerotic. A retroaortic left renal vein is noted. Bones/Soft Tissues: Osteo-degenerative changes are noted involving the visualized thoracolumbar spine and left hip. 1. Left lung findings, as described above. There are discussed in full detail on the report from the patient's CT scan of the chest performed the same day. Please refer to that transcription. 2.  No acute abdominal or pelvic pathology. 3.  Small amount of fluid in the ascending colon, which can be seen in diarrheal disease. HOSPITAL COURSE:   Queen Zacarias did very well throughout the hospitalization. He was found to be suffering from a pneumonic process with some imaging suggesting early abscess/empyema formation. CT scan will be repeated prior to discharge today and will need close follow-up as an outpatient with repeat CT scan in 4 to 6 weeks. Antibiotic therapy will be employed as per the infectious disease team.  Bronchoscopy has been deferred. He has been weaned off nasal cannula oxygen. Pulm medications have been maximized. In the setting of his underlying mental health, he will be arranged for outpatient counseling. We discussed the absolute need to avoid illicit drug use in the form of cocaine and he voiced understanding and agreement. He is feeling dramatically better and is anxious for discharge. Patient is medically stable and acceptable for discharge today.     BRIEF PHYSICAL EXAMINATION AND LABORATORIES ON DAY OF DISCHARGE:  VITALS:  BP 98/63   Pulse 65   Temp 97.6 °F (36.4 °C) (Oral)   Resp 18   Ht 6' (1.829 m)   Wt 215 lb (97.5 kg)   SpO2 98%   BMI 29.16 kg/m²     HEENT:  PERRLA. EOMI. Sclera clear. Buccal mucosa moist.    Neck:  Supple. Trachea midline. No thyromegaly. No JVD. No bruits. Heart:  Rhythm regular, rate controlled. No murmurs. Lungs:  Symmetrical. Clear to auscultation bilaterally. No wheezes. No rhonchi. No rales. Abdomen: Soft. Non-tender. Non-distended. Bowel sounds positive. No organomegaly or masses. No pain on palpation    Extremities:  Peripheral pulses present. No peripheral edema. No ulcers. Neurologic:  Alert x 3. No focal deficit. Cranial nerves grossly intact. Skin:  No petechia. No hemorrhage. No wounds. DISPOSITION:  The patient's condition is stable. At this time the patient is without objective evidence of an acute process requiring continuing hospitalization or inpatient management. They are stable for discharge with outpatient follow-up. I have spoken with the patient and discussed the results of the current hospitalization, in addition to providing specific details for the plan of care and counseling regarding the diagnosis and prognosis. The plan has been discussed in detail and they are aware of the specific conditions for emergent return, as well as the importance of follow-up. Their questions are answered at this time and they are agreeable with the plan for discharge to home    DISCHARGE MEDICATIONS:   Current Discharge Medication List             Details   predniSONE (DELTASONE) 10 MG tablet Take by oral route 4 tabs x 3 days, then 3 tabs x 3 days, then 2 tabs x 3 days, then 1 tab x 3 days, then discontinue. Take with food.   Qty: 30 tablet, Refills: 0                Details   fluticasone-umeclidin-vilant (Willingham Vargas) 100-62.5-25 MCG/INH AEPB INHALE 1 PUFF INTO THE LUNGS DAILY  Qty: 1 each, Refills: 1    Associated Diagnoses: Chronic obstructive pulmonary disease, unspecified COPD type (HCC)      folic acid (FOLVITE) 1 MG tablet Take 1 tablet by mouth daily  Qty: 90 tablet, Refills: 1    Associated Diagnoses: Medication refill      pantoprazole (PROTONIX) 40 MG tablet TAKE ONE TABLET BY MOUTH EVERY DAY WITH BREAKFAST  Qty: 90 tablet, Refills: 0    Associated Diagnoses: Gastroesophageal reflux disease without esophagitis      atorvastatin (LIPITOR) 20 MG tablet Take 1 tablet by mouth daily  Qty: 90 tablet, Refills: 1    Associated Diagnoses: Medication refill      albuterol sulfate HFA (PROAIR HFA) 108 (90 Base) MCG/ACT inhaler INHALE TWO PUFFS INTO THE LUNGS EVERY 6 HOURS AS NEEDED FOR SHORTNESS OF BREATH  Qty: 8.5 g, Refills: 2    Associated Diagnoses: Medication refill      Cholecalciferol (VITAMIN D3) 2000 units CAPS TAKE ONE CAPSULE BY MOUTH EVERY DAY  Qty: 30 capsule, Refills: 5    Associated Diagnoses: Vitamin D deficiency             Antibiotics will be employed as per the infectious disease team and are not included in the above medication reconciliation. FOLLOW UP/INSTRUCTIONS:  This patient is instructed to follow-up with his primary care physician. Patient is instructed to follow-up with the consults listed above as directed by them. he is instructed to resume home medications and take new medications as indicated in the list above. If the patient has a recurrence of symptoms, he is instructed to go to the ED. Preparing for this patient's discharge, including paperwork, orders, instructions, and meeting with patient did require > 40 minutes.     Carlene Hurst DO   11/9/2022  8:53 AM

## 2022-11-09 NOTE — DISCHARGE INSTRUCTIONS
Your information:  Name: Dewayne Xavier  : 1965    Your instructions:   Please refer to outpatient pulmonology as he will need follow up with CT scan in 4- 6 weeks to ensure resolution of the infiltrates    You are being discharged home. Please follow up with your physician at discharge and take your medications as prescribed. IF YOU EXPERIENCE ANY OF THE FOLLOWING SYMPTOMS, CHEST PAIN, SHORTNESS OF BREATH, COUGHING UP BLOOD OR BLOODY SPUTUM, STOMACH PAIN OR CRAMPING, DARK, TARRY STOOLS, LOSS OF APPETITE, GENERAL NOT FEELING WELL, SIGNS AND SYMPTOMS OF INFECTION LIKE FEVER AND OR CHILLS, PLEASE CALL DR. Carol Ann Dorado OR RETURN TO THE EMERGENCY ROOM. What to do after you leave the hospital:    Recommended diet: regular diet    Recommended activity: activity as tolerated    [address]: [place] : following appointments    2022 9:30 am-diagnostic assessment by phone appointment-Umm will call you (it may be a blocked call so please answer your phone at 9:30am)    22 2pm-appointment with Dr Emma Godoy at [facility]        The following personal items were collected during your admission and were returned to you:    Belongings  Dental Appliances: Uppers, Lowers  Vision - Corrective Lenses: Eyeglasses  Hearing Aid: None  Clothing: Pants, Shirt, Socks, Footwear, Undergarments, Jacket/Coat  Jewelry: None  Body Piercings Removed: N/A  Electronic Devices: Cell Phone,   Weapons (Notify Protective Services/Security): None  Other Valuables:  Other (Comment)  Home Medications: None  Valuables Given To: Patient (patient declined to have any belongings locked with police)  Provide Name(s) of Who Valuable(s) Were Given To: mayra  Responsible person(s) in the waiting room: n/a  Patient approves for provider to speak to responsible person post operatively: Yes    Information obtained by:  By signing below, I understand that if any problems occur once I leave the hospital I am to contact PCP. I understand and acknowledge receipt of the instructions indicated above.        Follow-up with ID in 2 weeks  Call office for appt [phone]

## 2022-11-09 NOTE — PROGRESS NOTES
CLINICAL PHARMACY NOTE: MEDS TO BEDS    Total # of Prescriptions Filled: 3   The following medications were delivered to the patient:  Prednisone 10 mg  Doxycycline Hyc 100 mg  Amoxicillin-Pot Clav 1000-62.5 mg    Additional Documentation:

## 2022-11-09 NOTE — PROGRESS NOTES
oRber Patel  MR:  [id number]  :   1965  Admit Date:  2022      This is a face to face encounter with Marcin Perdue 61 y.o. male on 22  Elements of this note, including Diagnosis,  Interval History, Past Medical/Surgical/Family/Social Histories, ROS, physical exam, and Assessment and Plan were copied and pasted from Previous. Updates have been made where noted and reflect current exam and medical decision making from the DOS of this encounter. CHIEF COMPLAINT     ID following for   Chief Complaint   Patient presents with    Fever     CHILLS/ SICK AFTER FLU SHOT/ HAD FEVER ON OCT 25/ BURNING UP THIS AM BUT DID NOT TAKE TEMP/ HAD APPT WITH DR SAMUELS TO SICK     HISTORY OF PRESENT ILLNESS   The patient is a 61 y.o. man not known to the Infectious Diseases service. The patient is admitted through the emergency room with fevers chills shortness of breath with productive cough. Patient has generalized malaise as well as diffuse myalgias and headaches. In the emergency room his white count was 16,000 hemoglobin 12.7 with LFTs of 52 and C-reactive protein is 13.4. His BUN and creatinine was 5 and 0.9. His albumin was only 3.4 and his respiratory viral panel was nondiagnostic urine for Legionella and strep pneumo are pending respiratory culture was sent. Empirically he was started on Merrem, Vibramycin, Solu-Medrol, vancomycin. He claims to have allergies or reactions with a rash to quinolones, hives with penicillin and shortness of breath with clindamycin. Patient recently got a flu shot back 2 weeks prior to this admission. The patient admits to taking trazodone and Benadryl to help him sleep. He also admitted to snort cocaine intermittently last snorting session was in October      Assessment & Plan   Abscess of lower lobe of left lung with pneumonia ([facility] [address] [J85.1]  left lung  Aspiration  Fevers  Leukocytosis  transaminitis  Allergies -pcn/cipro/clinda      Plan:    On solu-medrol Stop Tygacil and rocephin   Start Augmentin XR and doxycycline for 14 days   Discussed antibiotics with patient- he reports he has had Augmentin in the past.   Will need repeat CT scan   Pulmonary critical care following- no bronch  Repeat CT scan - reviewed   Monitor labs- watch LFTs- better today- WBC- 14.2   Check cultures - oral korey present   Med rec done  Follow-up in 2 weeks     Pt seen and examined  DOS  11/09/22  In bed on RA  Has no c/o f/c/n/v/d  No fevers. Cough better  Had CT scan      Patient is tolerating medications. No reported adverse drug reactions. REVIEW OF SYSTEMS     As stated above in the chief complaint, otherwise negative.   CURRENT MEDICATIONS     Current Facility-Administered Medications:     busPIRone (BUSPAR) tablet 10 mg, 10 mg, Oral, BID, Viri Diver, APRN - CNP, 10 mg at 11/09/22 0836    predniSONE (DELTASONE) tablet 40 mg, 40 mg, Oral, Daily with breakfast, Viri Diver, APRN - CNP, 40 mg at 11/09/22 0836    pantoprazole (PROTONIX) tablet 40 mg, 40 mg, Oral, QAM AC, Viri Diver, APRN - CNP, 40 mg at 11/09/22 0544    tigecycline (TYGACIL) 50 mg, [id number] in sodium chloride 0.9 % 100 mL IVPB, 50 mg, IntraVENous, Q12H, Elaine Thorne MD, Stopped at 11/09/22 0644    enoxaparin (LOVENOX) injection 40 mg, 40 mg, SubCUTAneous, Daily, Lillian Banda MD, 40 mg at 11/09/22 0836    atorvastatin (LIPITOR) tablet 20 mg, 20 mg, Oral, Daily, Viri Diver, APRN - CNP, 20 mg at 11/09/22 2222    Vitamin D (CHOLECALCIFEROL) tablet 2,000 Units, 2,000 Units, Oral, Daily, Viri Diver, APRN - CNP, 2,000 Units at 41/88/70 2618    folic acid (FOLVITE) tablet 1 mg, 1 mg, Oral, Daily, Viri Diver, APRN - CNP, 1 mg at 11/09/22 0836    traZODone (DESYREL) tablet 100 mg, 100 mg, Oral, Nightly, Viri Diver, APRN - CNP, 100 mg at 11/05/22 2319    albuterol (PROVENTIL) nebulizer solution 2.5 mg, 2.5 mg, Nebulization, Q4H PRN, Viri Diver, APRN - CNP, 2.5 mg at 11/08/22 5993 budesonide (PULMICORT) nebulizer suspension 500 mcg, 0.5 mg, Nebulization, BID, Jason Snipe, APRN - CNP, 500 mcg at 22 8480    hydrALAZINE (APRESOLINE) injection 5 mg, 5 mg, IntraVENous, Q4H PRN, Jason Snipe, APRN - CNP    magnesium sulfate 1000 mg in dextrose 5% 100 mL IVPB, 1,000 mg, IntraVENous, PRN, Jason Snipe, APRN - CNP    sodium phosphate 15.6 mmol in sodium chloride 0.9 % 250 mL IVPB, 0.16 mmol/kg, IntraVENous, PRN **OR** sodium phosphate 31.2 mmol in sodium chloride 0.9 % 500 mL IVPB, 0.32 mmol/kg, IntraVENous, PRN, Jason Snipe, APRN - CNP    potassium chloride (KLOR-CON M) extended release tablet 40 mEq, 40 mEq, Oral, PRN **OR** potassium bicarb-citric acid (EFFER-K) effervescent tablet 40 mEq, 40 mEq, Oral, PRN **OR** potassium chloride 10 mEq/100 mL IVPB (Peripheral Line), 10 mEq, IntraVENous, PRN, Jason Snipe, APRN - CNP    Arformoterol Tartrate (BROVANA) nebulizer solution 15 mcg, 15 mcg, Nebulization, BID, Jason Snipe, APRN - CNP, 15 mcg at 22 7514    acetaminophen (TYLENOL) tablet 650 mg, 650 mg, Oral, Q6H PRN, Mabelene Sandifer, DO, 650 mg at 22 0744    cefTRIAXone (ROCEPHIN) 2,000 mg in sterile water 20 mL IV syringe, 2,000 mg, IntraVENous, Q24H, Raheem Flanagan MD, 2,000 mg at 22 1328  PHYSICAL EXAM     BP 98/63   Pulse 65   Temp 97.6 °F (36.4 °C) (Oral)   Resp 18   Ht 6' (1.829 m)   Wt 215 lb (97.5 kg)   SpO2 98%   BMI 29.16 kg/m²   Temp  Av °F (36.7 °C)  Min: 97.6 °F (36.4 °C)  Max: 98.4 °F (36.9 °C)   CONSTITUTIONAL:  no apparent distress, and appears stated age. Sitting up in bed. ENT:  Normocephalic, atraumatic, sinuses nontender,     LUNGS:  No increased work of breathing,dec to auscultation bilaterally, no crackles or wheezing. Diminished to left base. On room air.    CARDIOVASCULAR:  Normal apical impulse, regular rate and rhythm, normal S1 and S2, no S3 or S4, and no murmur noted  ABDOMEN:  No scars, normal bowel sounds, soft, non-distended, non-tender  MUSCULOSKELETAL:  There is no redness, warmth, or swelling of the joints. Full range of motion noted. NEUROLOGIC:  Awake, alert, oriented. Cranial nerves II-XII are grossly intact. SKIN:  normal skin color, texture, turgor and no rashes  Lines:  PIV        Peripheral Intravenous Line:  Peripheral IV 11/04/22 Right Antecubital (Active)   Site Assessment Clean, dry & intact 11/04/22 2109   Line Status Blood return noted 11/04/22 2109   Phlebitis Assessment No symptoms 11/04/22 2109   Infiltration Assessment 0 11/04/22 2109       Peripheral IV 11/05/22 Left Forearm (Active)     DIAGNOSTIC RESULTS   Radiology:    Recent Labs     11/07/22 0618 11/08/22 0658 11/09/22  0653   WBC 16.8* 14.2* 14.2*   RBC 3.90 3.78* 4.22   HGB 11.9* 11.6* 12.5   HCT 36.0* 35.0* 38.9   MCV 92.3 92.6 92.2   MCH 30.5 30.7 29.6   MCHC 33.1 33.1 32.1   RDW 13.7 13.9 13.8   * 624* 726*   MPV 8.5 8.2 8.1       Recent Labs     11/07/22 0618 11/08/22  0658 11/09/22  0653    136 133   K 4.6 4.3 4.3    105 102   CO2 21* 23 22   BUN 19 19 19   CREATININE 0.7 0.8 0.8   GLUCOSE 135* 92 106*   PROT 6.1* 5.7* 5.9*   LABALBU 2.8* 2.7* 2.8*   CALCIUM 8.5* 8.2* 8.3*   BILITOT 0.3 0.3 0.3   ALKPHOS 69 63 72   AST 50* 22 13   ALT 61* 47* 38       Lab Results   Component Value Date    CRP 13.4 (H) 11/05/2022     Lab Results   Component Value Date    SEDRATE 95 (H) 11/05/2022     Recent Labs     11/07/22  0618 11/08/22  0658 11/09/22  0653   AST 50* 22 13   ALT 61* 47* 38       Lab Results   Component Value Date/Time    CHOL 148 11/06/2022 05:41 AM    TRIG 75 11/06/2022 05:41 AM    HDL 20 11/06/2022 05:41 AM    LDLCALC 113 11/06/2022 05:41 AM    LABVLDL 15 11/06/2022 05:41 AM     Lab Results   Component Value Date/Time    VITD25 39 10/14/2022 10:09 AM       Microbiology:   No results for input(s): COVID19 in the last 72 hours.     Lab Results   Component Value Date/Time    BC 24 Hours no growth 11/05/2022 07:25 AM    BC 24 Hours no growth 11/05/2022 12:28 AM    BC 24 Hours no growth 11/05/2022 12:28 AM    BLOODCULT2 24 Hours no growth 11/05/2022 07:25 AM    BLOODCULT2 5 Days- no growth 03/02/2018 04:26 AM    BLOODCULT2 5 Days- no growth 07/13/2015 11:57 AM        Imaging and labs were reviewed per medical records. POC was discussed with Marcinenrrique Perdue. The patient was educated about the diagnosis, indications, risks and benefits of treatment. An opportunity to ask questions was given to the patient/FAMILY. Thank you for involving me in the care of Marcin Perdue I will continue to follow. Please do not hesitate to call [phone] for any questions or concerns. Electronically signed by CAMILA Spencer on 11/9/2022 at 2:29 PM         As above         This is a face to face encounter with Deepthi Rodriguez on 11/09/22. I discussed the findings and plans with  NURSE PRACTITIONER, CAMILA Spencer and agree as documented in her note. See below for additional details and treatment plan. Deepthi Rodriguez is a 61 y.o. male who presents with   has a past medical history of COPD (chronic obstructive pulmonary disease) ([address] Depression, and Hyperlipidemia. Admit DX:  Abscess of lower lobe of left lung with pneumonia ([facility] [address] [J85.1]  ID was consulted for atbx  F/u ct chest shows improved  Will switch to po atbx augmentin and Doxy   Oral Pharyngeal Kia present      Smear, Respiratory --    Abundant Polymorphonuclear leukocytes   Rare Epithelial cells   Rare Gram positive cocci in clusters   Rare Gram variable rods      Will need f/u ct chest at outpt         amoxicillin-clavulanate (AUGMENTIN XR) 1000-62.5 MG per extended release tablet 2 tablet, 2 times per day  doxycycline hyclate (VIBRAMYCIN) capsule 100 mg, 2 times per day     F/u 1 month     Imaging and labs were reviewed. Deepthi Rodriguez was informed of their diagnosis, indications, risks and benefits of treatment.       [facility] Cristo Cruise had the opportunity to ask questions. All questions were answered. Thank you for involving me in the care of [address]. Please do not hesitate to call for any questions or concerns.     Electronically signed by Samantha Cotton MD on 11/9/2022 at 5:07 PM

## 2022-11-09 NOTE — PROGRESS NOTES
Tried to make follow up appointment was wrong number. Electronically signed by Sally Garcia on 11/9/22 at 9:30 AM EST

## 2022-11-09 NOTE — PROGRESS NOTES
Physical Therapy  Physical Therapy Treatment Note/Plan of Care    Room #:  4435/3068-92  Patient Name: Ely Puentes  YOB: 1965  MRN: [id number]    Date of Service: 11/9/2022     Tentative placement recommendation: Home  Equipment recommendation: None      Evaluating Physical Therapist: Latesha Escobar, PT  #[id number]      Specific Provider Orders/Date/Referring Provider :  11/05/22 0700    PT eval and treat  Start:  11/05/22 0700,   End:  11/05/22 0700,   ONE TIME,   Standing Count:  1 Occurrences,   R         Raghav Akhtar, APRN - CNP     Admitting Diagnosis:   Abscess of lower lobe of left lung with pneumonia ([facility] [address] [J85.1]     Admitted with    fever and chills; sepsis  Surgery: none      Patient Active Problem List   Diagnosis    Gastroesophageal reflux disease    Vitamin D deficiency    Folate deficiency    Dyslipidemia    Cigarette nicotine dependence with nicotine-induced disorder    COPD (chronic obstructive pulmonary disease) ([address]    Bipolar disorder with depression ([facility] [address]    Prediabetes    Abscess of lower lobe of left lung with pneumonia ([facility] [address]        ASSESSMENT of Current Deficits Patient exhibits decreased balance and endurance impairing functional mobility, transfers, gait , gait distance, and tolerance to activity are barriers to d/c and require skilled intervention during hospital stay to attain pre hospital level of function. Pt mildly unsteady during ambulation with 2 small LOB with Gavin required for correction.      PHYSICAL THERAPY  PLAN OF CARE       Physical therapy plan of care is established based on physician order,  patient diagnosis and clinical assessment    Current Treatment Recommendations:    -Standing Balance: Perform strengthening exercises in standing to promote motor control with or without upper extremity support   -Gait: Gait training and Standing activities to improve: base of support, weight shift, weight bearing    -Endurance: Utilize Supervised activities to increase level of endurance to allow for safe functional mobility including transfers and gait     PT long term treatment goals are located in below grid    Patient and or family understand(s) diagnosis, prognosis, and plan of care. Frequency of treatments: Patient will be seen  daily. Prior Level of Function: Patient ambulated independently    Rehab Potential: good   for baseline    Past medical history:   Past Medical History:   Diagnosis Date    COPD (chronic obstructive pulmonary disease) ([facility] [address]     Depression     Hyperlipidemia      Past Surgical History:   Procedure Laterality Date    CHOLECYSTECTOMY, LAPAROSCOPIC  7-6-15       SUBJECTIVE:    Precautions: Check Pulse Oximetry while ambulating  and up in chair , falls and alarm ,      Social history: Patient lives with roommate  in a ranch home  with No steps  to enter   Tub shower grab bars    Equipment owned: [employer],       [address]   How much difficulty turning over in bed?: None  How much difficulty sitting down on / standing up from a chair with arms?: None  How much difficulty moving from lying on back to sitting on side of bed?: None  How much help from another person moving to and from a bed to a chair?: A Little  How much help from another person needed to walk in hospital room?: A Little  How much help from another person for climbing 3-5 steps with a railing?: A Little  AM-PAC Inpatient Mobility Raw Score : 21  AM-PAC Inpatient T-Scale Score : 50.25  Mobility Inpatient CMS 0-100% Score: 28.97  Mobility Inpatient CMS G-Code Modifier : [address] cleared patient for PT treatment. OBJECTIVE;   Initial Evaluation  Date: 11/6/2022 Treatment Date:  11/9/2022     Short Term/ Long Term   Goals   Was pt agreeable to Eval/treatment? Yes Y To be met in 3 days   Pain level   0/10    0/10    Bed Mobility    Rolling: Independent    Supine to sit:  Independent    Sit to supine: Independent    Scooting: Independent    Rolling: Independent   Supine to sit: Independent   Sit to supine: Independent   Scooting: Independent       Transfers Sit to stand: Independent    Sit to stand: Independent       Ambulation     100 feet using  no device with Supervision    cues for safety and pacing 250' and 100 feet using  no device with Supervision    for balance, weight shift, and safety    200 feet using  no device with Independent    ROM Within functional limits        Strength BUE:   4/5  RLE:  4/5  LLE:  4/5  Increase strength in affected mm groups by 1/3 grade   Balance Sitting EOB:  good    Dynamic Standing:  good minus Sitting EOB: good   Dynamic Standing: fair +   Sitting EOB:  good    Dynamic Standing: good       Patient is Alert & Oriented x person, place, time, and situation and follows directions    Sensation:  Patient  denies numbness/tingling   Edema:  no   Endurance: good -      Vitals: room air   Blood Pressure at rest  Blood Pressure during session    Heart Rate at rest   Heart Rate during session    SPO2 at rest %  SPO2 during session %     Patient education  Patient educated on role of Physical Therapy, risks of immobility, safety and plan of care,  importance of mobility while in hospital , and safety      Patient response to education:   Pt verbalized understanding Pt demonstrated skill Pt requires further education in this area   Yes Partial Yes      Treatment:  Patient practiced and was instructed/facilitated in the following treatment: Patient   Sat edge of bed 5 minutes with Independent to increase dynamic sitting balance and activity tolerance. Therapeutic Exercises:  not performed       At end of session, patient in chair with  call light and phone within reach,  all lines and tubes intact, nursing notified. Patient would benefit from continued skilled Physical Therapy to improve functional independence and quality of life.          Patient's/ family goals   home    Time in  1516  Time out 1531    Total Treatment Time  15 minutes    CPT codes:  Therapeutic activities (73812)   15 minutes  1 unit(s)    Ra Perez PT

## 2022-11-09 NOTE — CARE COORDINATION
11/9/2022 1228 CM note: Negative covid 11/5/22. Pt plans to return home at d/c and will have transportation. [facility] outpatient counseling/psychiatry appointments made, placed on AVS and pt informed. He voiced understanding and gratitude. CM will follow for abx needs.  Nellie HATCH

## 2022-11-09 NOTE — PROGRESS NOTES
PULMONARY/ CRITICAL CARE MEDICINE FOLLOW UP    61year old person with PMH as described below admitted to hospital for management of       Acute hypoxemic respiratory failure and sepsis secondary to community acquired pneumonia in a patient with COPD  --Oxygen supplementation to maintain sats > 89%, currently on room air  --Antibiotic regimen as per ID: Ceftriaxone and tigecycline  --Cultures reviewed, no growth and urinary antigens are negative  --Continue bronchodilators  --Steroids for total 5 days  --Continue mucomyst for total 3 days  --DVT prophylaxis with enoxaparin       Reviewed issues with pt  Symptoms are most suggestive of recent PNA / infection  Malignancy cannot be excluded although interval CT today already shows improvement make malignancy much less likely   Reasonable to d/c home on Antibiotics as below and do f/u CT  Pt understands and defers on Fiberoptic Bronchoscopy in favor of interval CT follow-up     Absolute smoking cessation    All questions answered       Discharge recommendations:   --The patient will need exercise oximetry prior to discharge  --Bronchodilators: LABA/ICS + LAMA (Symbicort/advair + Spiriva or Trelegy Ellipta)  --Please refer to outpatient pulmonology as he will need follow up with CT scan in 4- 6 weeks to ensure resolution of the infiltrates            ___________________________________________        Rest of supportive care as per primary team    BP 98/63   Pulse 65   Temp 97.6 °F (36.4 °C) (Oral)   Resp 18   Ht 6' (1.829 m)   Wt 215 lb (97.5 kg)   SpO2 98%   BMI 29.16 kg/m²   General: Awake, oriented to place, time and person  HEENT: No head lesions, PERRL, EOMI, mouth without lesions, no nasal lesions, no cervical adenopathy palpated  Respiratory: Lungs with equal breath sounds bilaterally, no adventitious sounds auscultated, no accessory muscle use  CV: Regular rate, no murmurs, no JVD, no leg edema  Abdomen: Soft, non tender, + bowel sounds, no lesions  Skin: Hydrated, adequate turgor, no rash, capillary refill <2 seconds  Extremities: Muscular strength 5/5 in 4 limbs, moves 4 limbs spontaneously, distal pulses present  Neurology: Awake and alert, follows commands, moves 4 limbs on command and spontaneously, neck is supple, no meningitic signs present. Due to clinical improvement, we will not follow daily but PRN  Please call intensivist on call if the patient's condition were to worsen. Thank you for allowing us to participate in the care of Mr Edu Gamboa.        MEDICATIONS:   busPIRone  10 mg Oral BID    predniSONE  40 mg Oral Daily with breakfast    pantoprazole  40 mg Oral QAM AC    tigecycline (TYGACIL) IVPB  50 mg IntraVENous Q12H    enoxaparin  40 mg SubCUTAneous Daily    atorvastatin  20 mg Oral Daily    Vitamin D  2,000 Units Oral Daily    folic acid  1 mg Oral Daily    traZODone  100 mg Oral Nightly    budesonide  0.5 mg Nebulization BID    Arformoterol Tartrate  15 mcg Nebulization BID    cefTRIAXone (ROCEPHIN) IV  2,000 mg IntraVENous Q24H       albuterol, hydrALAZINE, magnesium sulfate, sodium phosphate IVPB **OR** sodium phosphate IVPB, potassium chloride **OR** potassium alternative oral replacement **OR** potassium chloride, acetaminophen    OBJECTIVE:  Vitals:    11/09/22 0735   BP: 98/63   Pulse: 65   Resp: 18   Temp: 97.6 °F (36.4 °C)   SpO2: 98%           O2 Device: None (Room air)        LABS:  WBC   Date Value Ref Range Status   11/09/2022 14.2 (H) 4.5 - 11.5 E9/L Final   11/08/2022 14.2 (H) 4.5 - 11.5 E9/L Final   11/07/2022 16.8 (H) 4.5 - 11.5 E9/L Final     Hemoglobin   Date Value Ref Range Status   11/09/2022 12.5 12.5 - 16.5 g/dL Final   11/08/2022 11.6 (L) 12.5 - 16.5 g/dL Final   11/07/2022 11.9 (L) 12.5 - 16.5 g/dL Final     Hematocrit   Date Value Ref Range Status   11/09/2022 38.9 37.0 - 54.0 % Final   11/08/2022 35.0 (L) 37.0 - 54.0 % Final   11/07/2022 36.0 (L) 37.0 - 54.0 % Final     MCV   Date Value Ref Range Status   11/09/2022 92.2 80.0 - 99.9 fL Final   11/08/2022 92.6 80.0 - 99.9 fL Final   11/07/2022 92.3 80.0 - 99.9 fL Final     Platelets   Date Value Ref Range Status   11/09/2022 726 (H) 130 - 450 E9/L Final   11/08/2022 624 (H) 130 - 450 E9/L Final   11/07/2022 658 (H) 130 - 450 E9/L Final     Sodium   Date Value Ref Range Status   11/09/2022 133 132 - 146 mmol/L Final   11/08/2022 136 132 - 146 mmol/L Final   11/07/2022 139 132 - 146 mmol/L Final     Potassium   Date Value Ref Range Status   11/09/2022 4.3 3.5 - 5.0 mmol/L Final   11/08/2022 4.3 3.5 - 5.0 mmol/L Final   11/07/2022 4.6 3.5 - 5.0 mmol/L Final     Potassium reflex Magnesium   Date Value Ref Range Status   03/03/2018 4.6 3.5 - 5.0 mmol/L Final     Comment:     Specimen is moderately Hemolyzed. Result may be artificially increased.      Chloride   Date Value Ref Range Status   11/09/2022 102 98 - 107 mmol/L Final   11/08/2022 105 98 - 107 mmol/L Final   11/07/2022 107 98 - 107 mmol/L Final     CO2   Date Value Ref Range Status   11/09/2022 22 22 - 29 mmol/L Final   11/08/2022 23 22 - 29 mmol/L Final   11/07/2022 21 (L) 22 - 29 mmol/L Final     BUN   Date Value Ref Range Status   11/09/2022 19 6 - 20 mg/dL Final   11/08/2022 19 6 - 20 mg/dL Final   11/07/2022 19 6 - 20 mg/dL Final     Creatinine   Date Value Ref Range Status   11/09/2022 0.8 0.7 - 1.2 mg/dL Final   11/08/2022 0.8 0.7 - 1.2 mg/dL Final   11/07/2022 0.7 0.7 - 1.2 mg/dL Final     Glucose   Date Value Ref Range Status   11/09/2022 106 (H) 74 - 99 mg/dL Final   11/08/2022 92 74 - 99 mg/dL Final   11/07/2022 135 (H) 74 - 99 mg/dL Final   10/14/2022 91 70 - 99 mg/dL Final     Calcium   Date Value Ref Range Status   11/09/2022 8.3 (L) 8.6 - 10.2 mg/dL Final   11/08/2022 8.2 (L) 8.6 - 10.2 mg/dL Final   11/07/2022 8.5 (L) 8.6 - 10.2 mg/dL Final     Total Protein   Date Value Ref Range Status   11/09/2022 5.9 (L) 6.4 - 8.3 g/dL Final   11/08/2022 5.7 (L) 6.4 - 8.3 g/dL Final   11/07/2022 6.1 (L) 6.4 - 8.3 g/dL Final     Albumin   Date Value Ref Range Status   11/09/2022 2.8 (L) 3.5 - 5.2 g/dL Final   11/08/2022 2.7 (L) 3.5 - 5.2 g/dL Final   11/07/2022 2.8 (L) 3.5 - 5.2 g/dL Final     Total Bilirubin   Date Value Ref Range Status   11/09/2022 0.3 0.0 - 1.2 mg/dL Final   11/08/2022 0.3 0.0 - 1.2 mg/dL Final   11/07/2022 0.3 0.0 - 1.2 mg/dL Final     Alkaline Phosphatase   Date Value Ref Range Status   11/09/2022 72 40 - 129 U/L Final   11/08/2022 63 40 - 129 U/L Final   11/07/2022 69 40 - 129 U/L Final     AST   Date Value Ref Range Status   11/09/2022 13 0 - 39 U/L Final   11/08/2022 22 0 - 39 U/L Final   11/07/2022 50 (H) 0 - 39 U/L Final     ALT   Date Value Ref Range Status   11/09/2022 38 0 - 40 U/L Final   11/08/2022 47 (H) 0 - 40 U/L Final   11/07/2022 61 (H) 0 - 40 U/L Final     Est, Glom Filt Rate   Date Value Ref Range Status   11/09/2022 >60 >=60 mL/min/1.73 Final     Comment:     Pediatric calculator link  [email or web address]. [email or web address]/kdoqi/gfr_calculatorped  Effective Oct 3, 2022  These results are not intended for use in patients  <25years of age. eGFR results are calculated without  a race factor using the 2021 CKD-EPI equation. Careful  clinical correlation is recommended, particularly when  comparing to results calculated using previous equations. The CKD-EPI equation is less accurate in patients with  extremes of muscle mass, extra-renal metabolism of  creatinine, excessive creatinine ingestion, or following  therapy that affects renal tubular secretion. 11/08/2022 >60 >=60 mL/min/1.73 Final     Comment:     Pediatric calculator link  [email or web address]/gfr_calculatorped  Effective Oct 3, 2022  These results are not intended for use in patients  <25years of age. eGFR results are calculated without  a race factor using the 2021 CKD-EPI equation.   Careful  clinical correlation is recommended, particularly when  comparing to results calculated using previous equations. The CKD-EPI equation is less accurate in patients with  extremes of muscle mass, extra-renal metabolism of  creatinine, excessive creatinine ingestion, or following  therapy that affects renal tubular secretion. 11/07/2022 >60 >=60 mL/min/1.73 Final     Comment:     Pediatric calculator link  [email or web address]. [email or web address]/kdoqi/gfr_calculatorped  Effective Oct 3, 2022  These results are not intended for use in patients  <25years of age. eGFR results are calculated without  a race factor using the 2021 CKD-EPI equation. Careful  clinical correlation is recommended, particularly when  comparing to results calculated using previous equations. The CKD-EPI equation is less accurate in patients with  extremes of muscle mass, extra-renal metabolism of  creatinine, excessive creatinine ingestion, or following  therapy that affects renal tubular secretion. GFR    Date Value Ref Range Status   04/11/2022 >60  Final   10/04/2021 >60  Final   04/06/2021 >60  Final     Magnesium   Date Value Ref Range Status   11/09/2022 2.5 1.6 - 2.6 mg/dL Final   11/08/2022 2.5 1.6 - 2.6 mg/dL Final   11/07/2022 2.3 1.6 - 2.6 mg/dL Final     Phosphorus   Date Value Ref Range Status   11/09/2022 4.1 2.5 - 4.5 mg/dL Final   11/08/2022 3.6 2.5 - 4.5 mg/dL Final   11/07/2022 4.0 2.5 - 4.5 mg/dL Final     No results for input(s): PH, PO2, PCO2, HCO3, BE, O2SAT in the last 72 hours. RADIOLOGY:  CT CHEST W CONTRAST   Final Result   Improvement with decrease seen in size of the masslike infiltrate within the   superior segment of the left lower lobe. There is trace effusion identified   on the left which is similar. CT ABDOMEN PELVIS W IV CONTRAST Additional Contrast? Oral   Final Result   1. Left lung findings, as described above. There are discussed in full   detail on the report from the patient's CT scan of the chest performed the   same day. Please refer to that transcription.       2. No acute abdominal or pelvic pathology. 3.  Small amount of fluid in the ascending colon, which can be seen in   diarrheal disease. CT CHEST W CONTRAST   Final Result   8.6 cm infiltrate of the superior segment left lower lobe with associated   mild pleural thickening up to 13 mm. There is no adenopathy. Taken   together, the findings suggest infectious process such as pulmonary abscess   and developing empyema over malignancy. However, careful clinical   correlation and imaging follow-up until resolution is required in order to   exclude malignancy. RECOMMENDATIONS:   Careful clinical correlation and follow up recommended. XR CHEST (2 VW)   Final Result   Dense triangular-shaped infiltrate in the superior segment left lower lobe   with left hilar fullness suspicious for malignancy. RECOMMENDATION:   I recommend CT scan of the chest and mediastinum with IV contrast media.                    Marianela Parrish MD  Pulmonary & Critical Care  11/9/2022 2:16 PM

## 2022-11-09 NOTE — PROGRESS NOTES
Physician Progress Note      Ronaldo Layne  CSN #:                  [id number]  :                       1965  ADMIT DATE:       2022 6:25 PM  [address] DATE:        2022 4:23 PM  RESPONDING  PROVIDER #:        Hilda Lentz DO          QUERY TEXT:    Pt admitted with Sepsis. Per d/c summary:  Sepsis secondary to pulmonary   abscess versus empyema with strong clinical suspicion for aspiration. Intermittent insufflation of cocaine likely contributing to aspiration. If   possible, please document in the discharge summary if you are evaluating   and/or treating any of the following: The medical record reflects the following:  Risk Factors: Pneumonia with abscess, COPD, Sepsis  Clinical Indicators: Procalcitonin 0.07,  CT : infiltrate left lower   lobe. ..findings suggest infectious process such as pulmonary abscess and   developing empyema over malignancy. CT :  Improvement with decrease seen   in size of the masslike infiltrate within left lower lobe. trace effusion   identified on the left which is similar  Treatment: IV Merrem, Vanc, Tygacil, Vibramycin, steroids, inhalers, PO   Augmentin & Doxy at discharge. ID, Pulm consults. Thank you, Franko Brooks RN [phone]  Options provided:  -- Aspiration pneumonia  -- Aspiration pneumonia ruled out  -- Other - I will add my own diagnosis  -- Disagree - Not applicable / Not valid  -- Disagree - Clinically unable to determine / Unknown  -- Refer to Clinical Documentation Reviewer    PROVIDER RESPONSE TEXT:    This patient has aspiration pneumonia.     Query created by: Ileana Farley on 2022 4:26 PM      Electronically signed by:  Todd Aldridge DO 2022 4:51 PM

## 2022-11-10 LAB
BLOOD CULTURE, ROUTINE: NORMAL
CULTURE, BLOOD 2: NORMAL

## 2022-11-10 NOTE — PROGRESS NOTES
Physician Progress Note      Michael Garcia  CSN #:                  [id number]  :                       1965  ADMIT DATE:       2022 6:25 PM  [address] DATE:        2022 4:23 PM  RESPONDING  PROVIDER #:        Deb Iglesias MD          QUERY TEXT:    Patient admitted with Sepsis, pneumonia. Noted documentation of: Acute   respiratory failure. .. on room air in Pulmonology note . In order to   support the diagnosis of acute respiratory failure, please include additional   clinical indicators in your documentation. Or please document if the   diagnosis of acute respiratory failure has been ruled out after further study. The medical record reflects the following:  Risk Factors: Sepsis, Pneumonia with lung abscess, AE copd, no home oxygen use  Clinical Indicators: SPO2  % on RA, RR 17-25, no respiratory distress,   denies shortness of breath  Treatment: Oxygen prn (not used), antibiotics, Prednisone, inhalers, nebs. Thank you, Lucinda Askew RN [facility] [phone]  Options provided:  -- Acute Respiratory Failure as evidenced by, Please document evidence.   -- Acute Respiratory Failure ruled out after study  -- Other - I will add my own diagnosis  -- Disagree - Not applicable / Not valid  -- Disagree - Clinically unable to determine / Unknown  -- Refer to Clinical Documentation Reviewer    PROVIDER RESPONSE TEXT:    This patient is in acute respiratory failure as evidenced by acute pneumonia    Query created by: Sacha Bolanos on 2022 10:45 AM      Electronically signed by:  Deb Iglesias MD 11/10/2022 10:04 AM

## 2022-11-23 ENCOUNTER — HOSPITAL ENCOUNTER (OUTPATIENT)
Age: 57
Discharge: HOME | End: 2022-11-25
Payer: MEDICARE

## 2022-11-23 ENCOUNTER — HOSPITAL ENCOUNTER (OUTPATIENT)
Dept: GENERAL RADIOLOGY | Age: 57
Discharge: HOME | End: 2022-11-25
Payer: MEDICARE

## 2022-11-23 DIAGNOSIS — J85.1 ABSCESS OF LOWER LOBE OF LEFT LUNG WITH PNEUMONIA (HCC): ICD-10-CM

## 2022-11-23 PROCEDURE — 71046 X-RAY EXAM CHEST 2 VIEWS: CPT

## 2022-12-02 ENCOUNTER — HOSPITAL ENCOUNTER (OUTPATIENT)
Dept: ULTRASOUND IMAGING | Age: 57
Discharge: HOME | End: 2022-12-02
Payer: MEDICARE

## 2022-12-02 DIAGNOSIS — R60.0 BILATERAL EDEMA OF LOWER EXTREMITY: ICD-10-CM

## 2022-12-02 PROCEDURE — 93970 EXTREMITY STUDY: CPT

## 2023-01-25 ENCOUNTER — TELEPHONE (OUTPATIENT)
Dept: CASE MANAGEMENT | Age: 58
End: 2023-01-25

## 2023-01-25 NOTE — TELEPHONE ENCOUNTER
I called Dr. Ann Livers office and spoke with [facility]. I shared with her that the patient was to soon to have his CT lung screening, since he just revceived a CT Chest W Contrast.  Lucia confirmed approval for patient's CT lung screening to be canceled. I then called the patient and shared with him that his CT lukasz screening would be canceled due to his recent CT Chest scan. Patient confirmed his cancellation with understanding.          Electronically signed by Giorgio Daugherty on 1/25/23 at 10:05 AM EST

## 2023-03-06 ENCOUNTER — APPOINTMENT (OUTPATIENT)
Dept: GENERAL RADIOLOGY | Age: 58
End: 2023-03-06
Payer: MEDICARE

## 2023-03-06 ENCOUNTER — HOSPITAL ENCOUNTER (EMERGENCY)
Age: 58
Discharge: HOME | End: 2023-03-06
Payer: MEDICARE

## 2023-03-06 ENCOUNTER — APPOINTMENT (OUTPATIENT)
Dept: CT IMAGING | Age: 58
End: 2023-03-06
Payer: MEDICARE

## 2023-03-06 VITALS
WEIGHT: 253 LBS | HEART RATE: 100 BPM | TEMPERATURE: 98 F | SYSTOLIC BLOOD PRESSURE: 133 MMHG | OXYGEN SATURATION: 97 % | DIASTOLIC BLOOD PRESSURE: 87 MMHG | BODY MASS INDEX: 34.31 KG/M2 | RESPIRATION RATE: 18 BRPM

## 2023-03-06 DIAGNOSIS — Z76.0 MEDICATION REFILL: ICD-10-CM

## 2023-03-06 DIAGNOSIS — J44.0 CHRONIC OBSTRUCTIVE PULMONARY DISEASE WITH ACUTE LOWER RESPIRATORY INFECTION (HCC): ICD-10-CM

## 2023-03-06 DIAGNOSIS — U07.1 COVID: Primary | ICD-10-CM

## 2023-03-06 DIAGNOSIS — R31.9 HEMATURIA, UNSPECIFIED TYPE: ICD-10-CM

## 2023-03-06 LAB
ALBUMIN SERPL-MCNC: 5.1 G/DL (ref 3.5–5.2)
ALP BLD-CCNC: 110 U/L (ref 40–129)
ALT SERPL-CCNC: 15 U/L (ref 0–40)
ANION GAP SERPL CALCULATED.3IONS-SCNC: 14 MMOL/L (ref 7–16)
AST SERPL-CCNC: 25 U/L (ref 0–39)
BACTERIA: ABNORMAL /HPF
BASOPHILS ABSOLUTE: 0 E9/L (ref 0–0.2)
BASOPHILS RELATIVE PERCENT: 0.4 % (ref 0–2)
BILIRUB SERPL-MCNC: 0.5 MG/DL (ref 0–1.2)
BILIRUBIN URINE: NEGATIVE
BLOOD, URINE: ABNORMAL
BUN BLDV-MCNC: 12 MG/DL (ref 6–20)
BURR CELLS: ABNORMAL
CALCIUM SERPL-MCNC: 9.5 MG/DL (ref 8.6–10.2)
CHLORIDE BLD-SCNC: 102 MMOL/L (ref 98–107)
CLARITY: CLEAR
CO2: 22 MMOL/L (ref 22–29)
COLOR: YELLOW
CREAT SERPL-MCNC: 1 MG/DL (ref 0.7–1.2)
EOSINOPHILS ABSOLUTE: 0 E9/L (ref 0.05–0.5)
EOSINOPHILS RELATIVE PERCENT: 0.1 % (ref 0–6)
EPITHELIAL CELLS, UA: ABNORMAL /HPF
GFR SERPL CREATININE-BSD FRML MDRD: >60 ML/MIN/1.73
GLUCOSE BLD-MCNC: 87 MG/DL (ref 74–99)
GLUCOSE URINE: NEGATIVE MG/DL
HCT VFR BLD CALC: 50.9 % (ref 37–54)
HEMOGLOBIN: 16.3 G/DL (ref 12.5–16.5)
INFLUENZA A BY PCR: NOT DETECTED
INFLUENZA B BY PCR: NOT DETECTED
KETONES, URINE: NEGATIVE MG/DL
LEUKOCYTE ESTERASE, URINE: NEGATIVE
LYMPHOCYTES ABSOLUTE: 1.93 E9/L (ref 1.5–4)
LYMPHOCYTES RELATIVE PERCENT: 20.9 % (ref 20–42)
MCH RBC QN AUTO: 29.9 PG (ref 26–35)
MCHC RBC AUTO-ENTMCNC: 32 % (ref 32–34.5)
MCV RBC AUTO: 93.2 FL (ref 80–99.9)
MONOCYTES ABSOLUTE: 1.1 E9/L (ref 0.1–0.95)
MONOCYTES RELATIVE PERCENT: 12.2 % (ref 2–12)
MYELOCYTE PERCENT: 0.9 % (ref 0–0)
NEUTROPHILS ABSOLUTE: 6.16 E9/L (ref 1.8–7.3)
NEUTROPHILS RELATIVE PERCENT: 66.1 % (ref 43–80)
NITRITE, URINE: NEGATIVE
PDW BLD-RTO: 14 FL (ref 11.5–15)
PH UA: 5.5 (ref 5–9)
PLATELET # BLD: 246 E9/L (ref 130–450)
PMV BLD AUTO: 9.2 FL (ref 7–12)
POIKILOCYTES: ABNORMAL
POTASSIUM REFLEX MAGNESIUM: 4.1 MMOL/L (ref 3.5–5)
PRO-BNP: 95 PG/ML (ref 0–125)
PROTEIN UA: ABNORMAL MG/DL
RBC # BLD: 5.46 E12/L (ref 3.8–5.8)
RBC UA: ABNORMAL /HPF (ref 0–2)
SARS-COV-2, NAAT: DETECTED
SODIUM BLD-SCNC: 138 MMOL/L (ref 132–146)
SPECIFIC GRAVITY UA: >=1.03 (ref 1–1.03)
STREP GRP A PCR: NEGATIVE
TOTAL PROTEIN: 8.4 G/DL (ref 6.4–8.3)
TROPONIN, HIGH SENSITIVITY: 6 NG/L (ref 0–11)
UROBILINOGEN, URINE: 0.2 E.U./DL
WBC # BLD: 9.2 E9/L (ref 4.5–11.5)
WBC UA: ABNORMAL /HPF (ref 0–5)

## 2023-03-06 PROCEDURE — 74177 CT ABD & PELVIS W/CONTRAST: CPT

## 2023-03-06 PROCEDURE — 2580000003 HC RX 258: Performed by: PHYSICIAN ASSISTANT

## 2023-03-06 PROCEDURE — 85025 COMPLETE CBC W/AUTO DIFF WBC: CPT

## 2023-03-06 PROCEDURE — 6370000000 HC RX 637 (ALT 250 FOR IP): Performed by: PHYSICIAN ASSISTANT

## 2023-03-06 PROCEDURE — 87635 SARS-COV-2 COVID-19 AMP PRB: CPT

## 2023-03-06 PROCEDURE — 96360 HYDRATION IV INFUSION INIT: CPT

## 2023-03-06 PROCEDURE — 6360000004 HC RX CONTRAST MEDICATION: Performed by: RADIOLOGY

## 2023-03-06 PROCEDURE — 99285 EMERGENCY DEPT VISIT HI MDM: CPT

## 2023-03-06 PROCEDURE — 80053 COMPREHEN METABOLIC PANEL: CPT

## 2023-03-06 PROCEDURE — 87502 INFLUENZA DNA AMP PROBE: CPT

## 2023-03-06 PROCEDURE — 84484 ASSAY OF TROPONIN QUANT: CPT

## 2023-03-06 PROCEDURE — 93005 ELECTROCARDIOGRAM TRACING: CPT | Performed by: PHYSICIAN ASSISTANT

## 2023-03-06 PROCEDURE — 81001 URINALYSIS AUTO W/SCOPE: CPT

## 2023-03-06 PROCEDURE — 83880 ASSAY OF NATRIURETIC PEPTIDE: CPT

## 2023-03-06 PROCEDURE — 71046 X-RAY EXAM CHEST 2 VIEWS: CPT

## 2023-03-06 PROCEDURE — 87880 STREP A ASSAY W/OPTIC: CPT

## 2023-03-06 RX ORDER — DOXYCYCLINE HYCLATE 100 MG/1
100 CAPSULE ORAL ONCE
Status: COMPLETED | OUTPATIENT
Start: 2023-03-06 | End: 2023-03-06

## 2023-03-06 RX ORDER — PREDNISONE 10 MG/1
40 TABLET ORAL DAILY
Qty: 20 TABLET | Refills: 0 | Status: SHIPPED | OUTPATIENT
Start: 2023-03-06 | End: 2023-03-11

## 2023-03-06 RX ORDER — BENZONATATE 100 MG/1
100 CAPSULE ORAL 3 TIMES DAILY PRN
Qty: 21 CAPSULE | Refills: 0 | Status: SHIPPED | OUTPATIENT
Start: 2023-03-06 | End: 2023-03-13

## 2023-03-06 RX ORDER — 0.9 % SODIUM CHLORIDE 0.9 %
500 INTRAVENOUS SOLUTION INTRAVENOUS ONCE
Status: COMPLETED | OUTPATIENT
Start: 2023-03-06 | End: 2023-03-06

## 2023-03-06 RX ORDER — DOXYCYCLINE HYCLATE 100 MG
100 TABLET ORAL 2 TIMES DAILY
Qty: 20 TABLET | Refills: 0 | Status: SHIPPED | OUTPATIENT
Start: 2023-03-06 | End: 2023-03-16

## 2023-03-06 RX ORDER — PREDNISONE 20 MG/1
60 TABLET ORAL ONCE
Status: COMPLETED | OUTPATIENT
Start: 2023-03-06 | End: 2023-03-06

## 2023-03-06 RX ORDER — ALBUTEROL SULFATE 90 UG/1
AEROSOL, METERED RESPIRATORY (INHALATION)
Qty: 8.5 G | Refills: 0 | Status: SHIPPED | OUTPATIENT
Start: 2023-03-06

## 2023-03-06 RX ADMIN — SODIUM CHLORIDE 500 ML: 9 INJECTION, SOLUTION INTRAVENOUS at 18:28

## 2023-03-06 RX ADMIN — PREDNISONE 60 MG: 20 TABLET ORAL at 21:49

## 2023-03-06 RX ADMIN — DOXYCYCLINE HYCLATE 100 MG: 100 CAPSULE ORAL at 21:49

## 2023-03-06 RX ADMIN — IOPAMIDOL 70 ML: 755 INJECTION, SOLUTION INTRAVENOUS at 20:05

## 2023-03-06 ASSESSMENT — PAIN - FUNCTIONAL ASSESSMENT: PAIN_FUNCTIONAL_ASSESSMENT: 0-10

## 2023-03-06 ASSESSMENT — PAIN DESCRIPTION - LOCATION: LOCATION: SHOULDER

## 2023-03-06 ASSESSMENT — PAIN SCALES - GENERAL: PAINLEVEL_OUTOF10: 7

## 2023-03-06 ASSESSMENT — LIFESTYLE VARIABLES: HOW MANY STANDARD DRINKS CONTAINING ALCOHOL DO YOU HAVE ON A TYPICAL DAY: PATIENT DOES NOT DRINK

## 2023-03-06 ASSESSMENT — PAIN DESCRIPTION - ORIENTATION: ORIENTATION: LEFT

## 2023-03-06 NOTE — ED PROVIDER NOTES
Independent ROSENDO Visit. HPI:  3/6/23, Time: 5:53 PM MAXIMILIAN Phelps is a 61 y.o. male presenting to the ED for fever cough, diarrhea, beginning 3 days ago. The complaint has been persistent, moderate in severity, and worsened by cough. patient comes in with complaint of midsternal chest discomfort on Saturday. States he believes he started with low-grade fever on Saturday he has had sore throat cough. Cough is intermittently productive for light yellow to white sputum. Has had mild intermittent shortness of breath. He has a history of COPD states he had used his inhaler. Denies any palpitations diaphoresis nausea. He states while in the ER he started with left lower quadrant pain he denies any nausea vomiting constipation has had some intermittent diarrhea denies any black tarry bloody appearing stool. He denies any urinary frequency urgency or burning. Patient states he had a history of pneumonia with abscess in November. Review of Systems:   A complete review of systems was performed and pertinent positives and negatives are stated within HPI, all other systems reviewed and are negative.          --------------------------------------------- PAST HISTORY ---------------------------------------------  Past Medical History:  has a past medical history of COPD (chronic obstructive pulmonary disease) ([address] Depression, and Hyperlipidemia. Past Surgical History:  has a past surgical history that includes Cholecystectomy, laparoscopic (7-6-15). Social History:  reports that he has been smoking cigarettes. He has a 42.00 pack-year smoking history. He has never used smokeless tobacco. He reports current drug use. Drug: Marijuana Charmayne Stai). He reports that he does not drink alcohol. Family History: family history includes Cancer in his mother; High Blood Pressure in his father. The patients home medications have been reviewed.     Allergies: Clindamycin/lincomycin, Celexa [citalopram hydrobromide], Linzess [linaclotide], Penicillins, and Ciprofloxacin    -------------------------------------------------- RESULTS -------------------------------------------------  All laboratory and radiology results have been personally reviewed by myself   LABS:  Results for orders placed or performed during the hospital encounter of 03/06/23   COVID-19, Rapid    Specimen: Nasopharyngeal Swab   Result Value Ref Range    SARS-CoV-2, NAAT DETECTED (A) Not Detected   Rapid influenza A/B antigens    Specimen: Nasopharyngeal   Result Value Ref Range    Influenza A by PCR Not Detected Not Detected    Influenza B by PCR Not Detected Not Detected   Strep Screen Group A Throat    Specimen: Throat   Result Value Ref Range    Strep Grp A PCR Negative Negative   CBC with Auto Differential   Result Value Ref Range    WBC 9.2 4.5 - 11.5 E9/L    RBC 5.46 3.80 - 5.80 E12/L    Hemoglobin 16.3 12.5 - 16.5 g/dL    Hematocrit 50.9 37.0 - 54.0 %    MCV 93.2 80.0 - 99.9 fL    MCH 29.9 26.0 - 35.0 pg    MCHC 32.0 32.0 - 34.5 %    RDW 14.0 11.5 - 15.0 fL    Platelets 035 007 - 956 E9/L    MPV 9.2 7.0 - 12.0 fL    Neutrophils % 66.1 43.0 - 80.0 %    Lymphocytes % 20.9 20.0 - 42.0 %    Monocytes % 12.2 (H) 2.0 - 12.0 %    Eosinophils % 0.1 0.0 - 6.0 %    Basophils % 0.4 0.0 - 2.0 %    Neutrophils Absolute 6.16 1.80 - 7.30 E9/L    Lymphocytes Absolute 1.93 1.50 - 4.00 E9/L    Monocytes Absolute 1.10 (H) 0.10 - 0.95 E9/L    Eosinophils Absolute 0.00 (L) 0.05 - 0.50 E9/L    Basophils Absolute 0.00 0.00 - 0.20 E9/L    Myelocyte Percent 0.9 0 - 0 %    Poikilocytes 1+     Uziel Cells 1+    Comprehensive Metabolic Panel w/ Reflex to MG   Result Value Ref Range    Sodium 138 132 - 146 mmol/L    Potassium reflex Magnesium 4.1 3.5 - 5.0 mmol/L    Chloride 102 98 - 107 mmol/L    CO2 22 22 - 29 mmol/L    Anion Gap 14 7 - 16 mmol/L    Glucose 87 74 - 99 mg/dL    BUN 12 6 - 20 mg/dL    Creatinine 1.0 0.7 - 1.2 mg/dL    Est, Glom Filt Rate >60 >=60 mL/min/1.73    Calcium 9.5 8.6 - 10.2 mg/dL    Total Protein 8.4 (H) 6.4 - 8.3 g/dL    Albumin 5.1 3.5 - 5.2 g/dL    Total Bilirubin 0.5 0.0 - 1.2 mg/dL    Alkaline Phosphatase 110 40 - 129 U/L    ALT 15 0 - 40 U/L    AST 25 0 - 39 U/L   Troponin   Result Value Ref Range    Troponin, High Sensitivity 6 0 - 11 ng/L   Brain Natriuretic Peptide   Result Value Ref Range    Pro-BNP 95 0 - 125 pg/mL   Urinalysis with Microscopic   Result Value Ref Range    Color, UA Yellow Straw/Yellow    Clarity, UA Clear Clear    Glucose, Ur Negative Negative mg/dL    Bilirubin Urine Negative Negative    Ketones, Urine Negative Negative mg/dL    Specific Gravity, UA >=1.030 1.005 - 1.030    Blood, Urine SMALL (A) Negative    pH, UA 5.5 5.0 - 9.0    Protein, UA TRACE Negative mg/dL    Urobilinogen, Urine 0.2 <2.0 E.U./dL    Nitrite, Urine Negative Negative    Leukocyte Esterase, Urine Negative Negative    WBC, UA 0-1 0 - 5 /HPF    RBC, UA NONE 0 - 2 /HPF    Epithelial Cells, UA NONE SEEN /HPF    Bacteria, UA NONE SEEN None Seen /HPF   EKG 12 Lead   Result Value Ref Range    Ventricular Rate 95 BPM    Atrial Rate 95 BPM    P-R Interval 148 ms    QRS Duration 76 ms    Q-T Interval 322 ms    QTc Calculation (Bazett) 404 ms    P Axis 82 degrees    R Axis 58 degrees    T Axis 79 degrees       RADIOLOGY:  Interpreted by Radiologist.  XR CHEST (2 VW)   Final Result   1. Background lung hyperinflation and coarse interstitial markings. No   acute cardiopulmonary pathology. 2.  Ill-defined opacity in the posterosuperior segment of the left lower   lung. Possibly reflects lung parenchymal scarring related to prior   infectious/inflammatory process. Prior chest CT was on November 9, 2022. RECOMMENDATION:   Since prior chest CT was in November of 2022, suggest scheduling for a repeat   routine outpatient chest CT at this time to reassess the changes in the   superior segment of the left lower lung.          CT ABDOMEN PELVIS W IV CONTRAST Additional Contrast? None   Final Result   1. Diverticulosis   2. No bowel obstruction, free air, or focal inflammatory changes. ------------------------- NURSING NOTES AND VITALS REVIEWED ---------------------------   The nursing notes within the ED encounter and vital signs as below have been reviewed. /87   Pulse 100   Temp 98 °F (36.7 °C)   Resp 18   Wt 253 lb (114.8 kg)   SpO2 97%   BMI 34.31 kg/m²   Oxygen Saturation Interpretation: Normal fever chills yesterday no cough       ---------------------------------------------------PHYSICAL EXAM--------------------------------------      Constitutional/General: Alert and oriented x3, well appearing, non toxic in NAD  Head: Normocephalic and atraumatic  Eyes: PERRL, EOMI  Mouth: Oropharynx clear, handling secretions, no trismus erythema of the posterior pharynx no tonsillar swelling or exudate uvula midline  Neck: Supple, full ROM,   Pulmonary: Lungs clear to auscultation bilaterally, no wheezes, rales, or rhonchi. Slightly diminished not in respiratory distress  Cardiovascular:  Regular rate and rhythm, no murmurs, gallops, or rubs. 2+ distal pulses  Abdomen: Soft, mild tenderness left lower quadrant non distended, no guarding or rebound  Extremities: Moves all extremities x 4. Warm and well perfused  Skin: warm and dry without rash  Neurologic: GCS 15,  Psych: Normal Affect      ------------------------------ ED COURSE/MEDICAL DECISION MAKING----------------------  Medications   0.9 % sodium chloride bolus (0 mLs IntraVENous Stopped 3/6/23 1947)   iopamidol (ISOVUE-370) 76 % injection 70 mL (70 mLs IntraVENous Given 3/6/23 2005)   doxycycline hyclate (VIBRAMYCIN) capsule 100 mg (100 mg Oral Given 3/6/23 2149)   predniSONE (DELTASONE) tablet 60 mg (60 mg Oral Given 3/6/23 2149)         ED COURSE:      EKG #1:  Interpreted by emergency department attending physician unless otherwise noted.     3/6/23  Time: 1832    Rhythm: normal sinus   Rate: 95  Axis: normal  Conduction: normal  ST Segments: nonspecific changes  T Waves: normal    Clinical Impression: NSR, Normal ecg  Comparison to Prior tracings: There are no significant changes when compared to prior tracings. Medical Decision Making  Mack De La Torre is a 61 y.o. male presenting to the ED for fever cough, diarrhea, beginning 3 days ago. The complaint has been persistent, moderate in severity, and worsened by cough. patient comes in with complaint of midsternal chest discomfort on Saturday. States he believes he started with low-grade fever on Saturday he has had sore throat cough. Cough is intermittently productive for light yellow to white sputum. Has had mild intermittent shortness of breath. He has a history of COPD states he had used his inhaler. Denies any palpitations diaphoresis nausea. He states while in the ER he started with left lower quadrant pain he denies any nausea vomiting constipation has had some intermittent diarrhea denies any black tarry bloody appearing stool. He denies any urinary frequency urgency or burning. Patient states he had a history of pneumonia with abscess in November. Amount and/or Complexity of Data Reviewed  External Data Reviewed: labs and radiology. Labs: ordered. Decision-making details documented in ED Course. Radiology: ordered and independent interpretation performed. Decision-making details documented in ED Course. ECG/medicine tests: ordered. Risk  Prescription drug management. Medical Decision Making    Patient presents to the ER for fever, cough, left lower quadrant pain. Social Determinants include   Social Connections: Not on file    Social Determinants : None. Chronic conditions    Past Medical History:   Diagnosis Date    COPD (chronic obstructive pulmonary disease) (HCC)     Depression     Hyperlipidemia    .     Physical exam   Constitutional/General: Alert and oriented x3, well appearing, non toxic in NAD  Head: Normocephalic and atraumatic  Eyes: PERRL, EOMI  Mouth: Oropharynx clear, handling secretions, no trismus erythema of the posterior pharynx no tonsillar swelling or exudate uvula midline  Neck: Supple, full ROM,   Pulmonary: Lungs clear to auscultation bilaterally, no wheezes, rales, or rhonchi. Slightly diminished not in respiratory distress  Cardiovascular:  Regular rate and rhythm, no murmurs, gallops, or rubs. 2+ distal pulses  Abdomen: Soft, mild tenderness left lower quadrant non distended, no guarding or rebound  Extremities: Moves all extremities x 4. Warm and well perfused  Skin: warm and dry without rash  Neurologic: GCS 15,  Psych: Normal Affect. Vital signs     /81Temp   98 °F    (36.7 °C)  Heart Rate 100Resp 18SpO2 98%Wt   253 lb    (114.8 kg)  Ht   6'    (182.9 cm)  BMI 34.31 kg/m²Pain Level 7. Differential diagnoses include but not limited to strep COVID flu, pneumonia, diverticulitis, UTI, colitis, pyelonephritis. Diagnostic studies revealed CBC no leukocytosis CMP no electrolyte abnormalities normal liver enzymes normal kidney function. Troponin is normal at 6 proBNP 95 urinalysis showing moderate blood there is no nitrate leukocyte 0-1 WBCs 97 evaluated tract infection influenza A in ER negative COVID-19 is positive strep negative chest x-ray showing ill-defined opacity of the posterior superior segment of the left lower lung probably simply reflects lung parenchymal scarring related to prior infectious inflammatory process recommendation is for repeat Miguel A CT outpatient to assess changes to the superior segment of the left lower lung. Consults included  none Results were discussed with patient. Patient was given 1 L normal saline for their symptoms doxycycline to 100 mg p.o. x1 prednisone burst 60 mg p.o. times with mild improvement.  Patient will be discharged home with the following prescriptions, doxycycline 100 mg every 12 hours, prednisone burst 40 mg daily for 5 days Tessalon perles. .  Discussed appropriate use and potential side effects of starting the prescribed medications. Patient continues to be non-toxic on re-evaluation. Findings were discussed with the patient and reasons to immediately return to the ED were articulated to them. They will follow-up with their PMD       Discharge Instructions:   Patient referred to  [facility] [address] [place] [address] [id number]    Call in 2 days      MEDICATIONS:   DISCHARGE MEDICATIONS:  Discharge Medication List as of 3/6/2023  9:54 PM        START taking these medications    Details   doxycycline hyclate (VIBRA-TABS) 100 MG tablet Take 1 tablet by mouth 2 times daily for 10 days, Disp-20 tablet, R-0Normal      benzonatate (TESSALON PERLES) 100 MG capsule Take 1 capsule by mouth 3 times daily as needed for Cough, Disp-21 capsule, R-0Normal      predniSONE (DELTASONE) 10 MG tablet Take 4 tablets by mouth daily for 5 days, Disp-20 tablet, R-0Normal             DISCONTINUED MEDICATIONS:  Discharge Medication List as of 3/6/2023  9:54 PM          Record Review:  Records Reviewed : Source recent [facility] encounters       Disposition Considerations: This patient's ED course included: a personal history and physicial examination, re-evaluation prior to disposition, and IV medications  This patient has remained hemodynamically stable during their ED course. I emphasized the importance of follow-up with the physician I referred them to in the timeframe recommended. I discussed with the patient emergent symptoms and the need to immediately return to the ER. Written information was included in their discharge instructions. Additional verbal discharge instructions were also given and discussed with the patient to supplement those generated by the EMR. We also discussed medications that were prescribed  (if any) including common side effects and interactions.  The patient was advised to abstain from driving, operating heavy machinery or making significant decisions while taking medications such as opiates and muscle relaxers that may impair this. All questions were addressed. They understand return precautions and discharge instructions. The patient  expressed understanding. Vitals were stable and they were in no distress at discharge. Counseling: The emergency provider has spoken with the patient and discussed todays results, in addition to providing specific details for the plan of care and counseling regarding the diagnosis and prognosis. Questions are answered at this time and they are agreeable with the plan.      --------------------------------- IMPRESSION AND DISPOSITION ---------------------------------    IMPRESSION  1. COVID    2. Hematuria, unspecified type    3. Chronic obstructive pulmonary disease with acute lower respiratory infection ([facility] [address]    4. Medication refill        DISPOSITION  Disposition: Discharge to home  Patient condition is good      NOTE: This report was transcribed using voice recognition software.  Every effort was made to ensure accuracy; however, inadvertent computerized transcription errors may be present      Kraig Tabor [place]  03/06/23 [id number]

## 2023-03-06 NOTE — Clinical Note
Hamida Valadez was seen and treated in our emergency department on 3/6/2023. He may return to work on 03/11/2023. If you have any questions or concerns, please don't hesitate to call.       DEVAN Lomas

## 2023-03-07 NOTE — ED NOTES
Discussed with the patient and all questioned fully answered. He will call me if any problems arise.        Emily Wilson RN  03/06/23 [id number]

## 2023-03-07 NOTE — DISCHARGE INSTRUCTIONS
1.  Background lung hyperinflation and coarse interstitial markings. No   acute cardiopulmonary pathology. 2.  Ill-defined opacity in the posterosuperior segment of the left lower   lung. Possibly reflects lung parenchymal scarring related to prior   infectious/inflammatory process. Prior chest CT was on November 9, 2022. RECOMMENDATION:   Since prior chest CT was in November of 2022, suggest scheduling for a repeat   routine outpatient chest CT at this time to reassess the changes in the   superior segment of the left lower lung.              Narrative:

## 2023-03-07 NOTE — ED NOTES
Pt ambulated with pulse ox, 97% during walk      Emily Kelsealynn, [address] [facility]  03/06/23 [id number]

## 2023-03-08 LAB
EKG ATRIAL RATE: 95 BPM
EKG P AXIS: 82 DEGREES
EKG P-R INTERVAL: 148 MS
EKG Q-T INTERVAL: 322 MS
EKG QRS DURATION: 76 MS
EKG QTC CALCULATION (BAZETT): 404 MS
EKG R AXIS: 58 DEGREES
EKG T AXIS: 79 DEGREES
EKG VENTRICULAR RATE: 95 BPM

## 2023-03-08 PROCEDURE — 93010 ELECTROCARDIOGRAM REPORT: CPT | Performed by: INTERNAL MEDICINE

## 2023-03-15 ENCOUNTER — HOSPITAL ENCOUNTER (EMERGENCY)
Age: 58
Discharge: HOME | End: 2023-03-15
Attending: EMERGENCY MEDICINE
Payer: MEDICARE

## 2023-03-15 ENCOUNTER — APPOINTMENT (OUTPATIENT)
Dept: GENERAL RADIOLOGY | Age: 58
End: 2023-03-15
Payer: MEDICARE

## 2023-03-15 VITALS
DIASTOLIC BLOOD PRESSURE: 82 MMHG | OXYGEN SATURATION: 99 % | RESPIRATION RATE: 18 BRPM | HEART RATE: 102 BPM | TEMPERATURE: 97.3 F | SYSTOLIC BLOOD PRESSURE: 132 MMHG

## 2023-03-15 DIAGNOSIS — S63.259A DISLOCATION OF FINGER, INITIAL ENCOUNTER: Primary | ICD-10-CM

## 2023-03-15 PROCEDURE — 73130 X-RAY EXAM OF HAND: CPT

## 2023-03-15 PROCEDURE — 99283 EMERGENCY DEPT VISIT LOW MDM: CPT

## 2023-03-15 PROCEDURE — 6370000000 HC RX 637 (ALT 250 FOR IP): Performed by: EMERGENCY MEDICINE

## 2023-03-15 RX ORDER — ACETAMINOPHEN 325 MG/1
650 TABLET ORAL ONCE
Status: COMPLETED | OUTPATIENT
Start: 2023-03-15 | End: 2023-03-15

## 2023-03-15 RX ADMIN — ACETAMINOPHEN 650 MG: 325 TABLET ORAL at 21:33

## 2023-03-15 ASSESSMENT — LIFESTYLE VARIABLES: HOW OFTEN DO YOU HAVE A DRINK CONTAINING ALCOHOL: NEVER

## 2023-03-15 ASSESSMENT — PAIN SCALES - GENERAL: PAINLEVEL_OUTOF10: 5

## 2023-03-16 NOTE — ED PROVIDER NOTES
[address]        Pt Name: Bin Maya  MRN: [id number]  [place] 1965  Date of evaluation: 3/15/2023  Provider: iMlan Galaviz DO  PCP: Latisha Singleton DO  Note Started: 9:17 PM EDT 3/15/23    CHIEF COMPLAINT       Chief Complaint   Patient presents with    Fall     Pt fell landed on knees and hands, Pinky finger on left hand is facing sideways. Pt denies any blood thinners, pt denies hitting head, denies LOC. HISTORY OF PRESENT ILLNESS: 1 or more Elements   History From: patient    Limitations to history : None    Marcin Perdue is a 61 y.o. male who presents to the ED for evaluation of a fall that occurred at home this evening. Patient states he was walking inside and tripped on the stairs. He fell onto his knees and then onto his left hand. His friend noticed that his hand to have a dislocated finger which is what prompted him to come to the ED. He is minimal to walk on both lower extremities without difficulty. States he is mostly here for the hand. Patient is right-handed. Denies any numbness or tingling. Did not do anything to try to relocate the finger after the fall. Denies any associated numbness. Denies head injury or back pain. Denies neck pain    Nursing Notes were all reviewed and agreed with or any disagreements were addressed in the HPI. REVIEW OF EXTERNAL NOTE :           REVIEW OF SYSTEMS :           Positives and Pertinent negatives as per HPI.      SURGICAL HISTORY     Past Surgical History:   Procedure Laterality Date    CHOLECYSTECTOMY, LAPAROSCOPIC  7-6-15       CURRENTMEDICATIONS       Previous Medications    ALBUTEROL SULFATE HFA (PROAIR HFA) 108 (90 BASE) MCG/ACT INHALER    INHALE TWO PUFFS INTO THE LUNGS EVERY 6 HOURS AS NEEDED FOR SHORTNESS OF BREATH    ATORVASTATIN (LIPITOR) 20 MG TABLET    Take 1 tablet by mouth daily    CHOLECALCIFEROL (VITAMIN D3) 2000 UNITS CAPS    TAKE ONE CAPSULE BY MOUTH EVERY DAY    DOXEPIN (SINEQUAN) 25 MG CAPSULE    TAKE ONE CAPSULE BY MOUTH AT BEDTIME AS NEEDED FOR INSOMNIA    DOXYCYCLINE HYCLATE (VIBRA-TABS) 100 MG TABLET    Take 1 tablet by mouth 2 times daily for 10 days    ELASTIC BANDAGES & SUPPORTS (WRIST SPLINT/COCK-UP/LEFT L) MISC    Wear nightly    ELASTIC BANDAGES & SUPPORTS (WRIST SPLINT/COCK-UP/RIGHT L) MISC    Wear nightly    FLUTICASONE-UMECLIDIN-VILANT (TRELEGY ELLIPTA) 100-62.5-25 MCG/ACT AEPB INHALER    INHALE ONE PUFF BY MOUTH INTO THE LUNGS DAILY    FOLIC ACID (FOLVITE) 1 MG TABLET    Take 1 tablet by mouth daily    FUROSEMIDE (LASIX) 20 MG TABLET    Take 1 tablet by mouth daily as needed (leg swelling)    LAMOTRIGINE (LAMICTAL) 100 MG TABLET    TAKE ONE TABLET BY MOUTH ONCE DAILY    PANTOPRAZOLE (PROTONIX) 40 MG TABLET    TAKE ONE TABLET BY MOUTH EVERY DAY WITH BREAKFAST    PRAZOSIN (MINIPRESS) 1 MG CAPSULE    TAKE ONE CAPSULE BY MOUTH AT BEDTIME    VITAMIN B-12 (CYANOCOBALAMIN) 1000 MCG TABLET    Take 1 tablet by mouth daily       ALLERGIES     Clindamycin/lincomycin, Celexa [citalopram hydrobromide], Linzess [linaclotide], Penicillins, and Ciprofloxacin    FAMILYHISTORY       Family History   Problem Relation Age of Onset    Cancer Mother         breast cancer    High Blood Pressure Father         SOCIAL HISTORY       Social History     Tobacco Use    Smoking status: Every Day     Packs/day: 1.00     Years: 42.00     Pack years: 42.00     Types: Cigarettes    Smokeless tobacco: Never   Vaping Use    Vaping Use: Never used   Substance Use Topics    Alcohol use: No     Alcohol/week: 0.0 standard drinks    Drug use: Yes     Types: Marijuana (Weed)     Comment: occasional last used 2/12/18       SCREENINGS                         CIWA Assessment  BP: 132/82  Heart Rate: (!) 102           PHYSICAL EXAM  1 or more Elements     ED Triage Vitals   BP Temp Temp src Heart Rate Resp SpO2 Height Weight   03/15/23 2109 03/15/23 2054 -- 03/15/23 2054 03/15/23 2109 03/15/23 2054 -- --   132/82 97.3 °F (36.3 °C)  (!) 112 18 99 %         Constitutional/General: Alert and oriented x3  Head: Normocephalic and atraumatic  Eyes: PERRL, EOMI, conjunctiva normal, sclera non icteric  ENT:  Oropharynx clear, handling secretions,   Neck: Supple, full ROM, no stridor, no midline cervical spine tenderness  Respiratory: Lungs clear to auscultation bilaterally  Cardiovascular:  Regular rate. Regular rhythm. No murmurs, no gallops, no rubs. 2+ distal pulses. Specifically 2+ left radial pulse  Chest: No chest wall tenderness  Musculoskeletal: Moves all extremities x 4. Warm and well perfused. Capillary refill <3 seconds. Patient does have dislocation laterally of the PIP joint on the left fifth digit no open wounds or sores. Integument: skin warm and dry. No rashes. Neurologic: GCS 15, sensation grossly intact in left hand specifically left fifth digit  Psychiatric: Normal Affect            DIAGNOSTIC RESULTS   LABS:    Labs Reviewed - No data to display    As interpreted by me, the above displayed labs are abnormal. All other labs obtained during this visit were within normal range or not returned as of this dictation. RADIOLOGY:   Non-plain film images such as CT, Ultrasound and MRI are read by the radiologist. Plain radiographic images are visualized and preliminarily interpreted by the ED Provider with the below findings:        Interpretation per the Radiologist below, if available at the time of this note:    XR HAND LEFT (MIN 3 VIEWS)   Final Result   Successful reduction of the 5th PIP dislocation with no acute fracture   identified. No results found. No results found. PROCEDURES     Joint Reduction Procedure Note    Indication: Joint dislocation    Consent: The patient provided verbal consent for this procedure. Procedure:  The pre-reduction exam showed distal perfusion & neurologic function to be normal. The patient was placed in the appropriate position. Anesthesia/pain control was not used. Reduction of the left short (pinkie) finger PIP joint was performed by direct traction. Post reduction films were obtained and revealed satisfactory reduction. A post-reduction exam revealed distal perfusion & neurologic function to be normal. The affected area was immobilized with an aluminum/ foam splint. The patient tolerated the procedure well. Complications: None           CRITICAL CARE TIME (.cct)   NA    PAST MEDICAL HISTORY/Chronic Conditions Affecting Care      has a past medical history of COPD (chronic obstructive pulmonary disease) ([facility] [address] Depression, and Hyperlipidemia. EMERGENCY DEPARTMENT COURSE    Vitals:    Vitals:    03/15/23 2054 03/15/23 2109   BP:  132/82   Pulse: (!) 112 (!) 102   Resp:  18   Temp: 97.3 °F (36.3 °C)    SpO2: 99%        Patient was given the following medications:  Medications   acetaminophen (TYLENOL) tablet 650 mg (650 mg Oral Given 3/15/23 2133)             Medical Decision Making/Differential Diagnosis:    CC/HPI Summary, Social Determinants of health, Records Reviewed, DDx, testing done/not done, ED Course, Reassessment, disposition considerations/shared decision making with patient, consults, disposition:            Medical Decision Making  Amount and/or Complexity of Data Reviewed  Radiology: ordered. Risk  OTC drugs. Patient presents to the ED for evaluation of a fall that occurred at home this evening. Patient states he was walking inside and tripped on the stairs. He fell onto his knees and then onto his left hand. His friend noticed that his hand to have a dislocated finger which is what prompted him to come to the ED. He is minimal to walk on both lower extremities without difficulty. States he is mostly here for the hand. Patient is right-handed. Denies any numbness or tingling. Did not do anything to try to relocate the finger after the fall.  On exam patient does have dislocation laterally of the PIP joint on the left fifth digit no open wounds or sores. Differential diagnoses included but not limited to fracture, dislocation, ligamentous injury. Workup in the ED consisted of plain film imaging of the right PIP fifth joint dislocation after reduction. Imaging showed no evidence of fracture or continued dislocation as interpreted by myself. Radiologist confirmed that there was proper alignment without any fractures. Patient was given 650mg of PO Tylenol and ice for their symptoms with moderate improvement. Patient placed in a aluminum finger splint and had his fifth digit christne taped to his fourth digit. Patient continues to be non-toxic on re-evaluation. Findings were discussed with the patient and reasons to immediately return to the ED were articulated to them. They will follow-up with his PMD and orthopedics. CONSULTS: (Who and What was discussed)  None        I am the Primary Clinician of Record. FINAL IMPRESSION      1. Dislocation of finger, initial encounter          DISPOSITION/PLAN     DISPOSITION Decision To Discharge 03/15/2023 10:58:09 PM      PATIENT REFERRED TO:  Vitaliy Mascorro DO  [address] [place] [address]  [phone]    Call   If symptoms worsen return    DISCHARGE MEDICATIONS:  New Prescriptions    No medications on file       DISCONTINUED MEDICATIONS:  Discontinued Medications    No medications on file              (Please note that portions of this note were completed with a voice recognition program.  Efforts were made to edit the dictations but occasionally words are mis-transcribed. )    Niurka Balderrama DO (electronically signed)            Niurka Balderrama DO  03/15/23 6599

## 2023-04-17 ENCOUNTER — HOSPITAL ENCOUNTER (OUTPATIENT)
Dept: NUCLEAR MEDICINE | Age: 58
Discharge: HOME | End: 2023-04-17
Payer: MEDICARE

## 2023-04-17 DIAGNOSIS — R91.8 OPACITY OF LUNG ON IMAGING STUDY: ICD-10-CM

## 2023-04-17 PROCEDURE — 3430000000 HC RX DIAGNOSTIC RADIOPHARMACEUTICAL: Performed by: RADIOLOGY

## 2023-04-17 PROCEDURE — A9552 F18 FDG: HCPCS | Performed by: RADIOLOGY

## 2023-04-17 PROCEDURE — 78815 PET IMAGE W/CT SKULL-THIGH: CPT

## 2023-04-17 RX ORDER — FLUDEOXYGLUCOSE F 18 200 MCI/ML
15 INJECTION, SOLUTION INTRAVENOUS
Status: COMPLETED | OUTPATIENT
Start: 2023-04-17 | End: 2023-04-17

## 2023-04-17 RX ADMIN — FLUDEOXYGLUCOSE F 18 15 MILLICURIE: 200 INJECTION, SOLUTION INTRAVENOUS at 20:22

## 2023-05-30 DIAGNOSIS — M25.512 LEFT SHOULDER PAIN, UNSPECIFIED CHRONICITY: Primary | ICD-10-CM

## 2023-05-31 ENCOUNTER — OFFICE VISIT (OUTPATIENT)
Dept: ORTHOPEDIC SURGERY | Age: 58
End: 2023-05-31
Payer: MEDICARE

## 2023-05-31 VITALS — HEIGHT: 72 IN | TEMPERATURE: 98 F | WEIGHT: 250 LBS | BODY MASS INDEX: 33.86 KG/M2

## 2023-05-31 DIAGNOSIS — M75.102 NONTRAUMATIC TEAR OF LEFT ROTATOR CUFF, UNSPECIFIED TEAR EXTENT: Primary | ICD-10-CM

## 2023-05-31 DIAGNOSIS — Z71.82 EXERCISE COUNSELING: ICD-10-CM

## 2023-05-31 PROCEDURE — 3017F COLORECTAL CA SCREEN DOC REV: CPT | Performed by: ORTHOPAEDIC SURGERY

## 2023-05-31 PROCEDURE — G8427 DOCREV CUR MEDS BY ELIG CLIN: HCPCS | Performed by: ORTHOPAEDIC SURGERY

## 2023-05-31 PROCEDURE — 4004F PT TOBACCO SCREEN RCVD TLK: CPT | Performed by: ORTHOPAEDIC SURGERY

## 2023-05-31 PROCEDURE — G8417 CALC BMI ABV UP PARAM F/U: HCPCS | Performed by: ORTHOPAEDIC SURGERY

## 2023-05-31 PROCEDURE — 99203 OFFICE O/P NEW LOW 30 MIN: CPT | Performed by: ORTHOPAEDIC SURGERY

## 2023-05-31 NOTE — PROGRESS NOTES
Chief Complaint   Patient presents with    Shoulder Pain     Left Shoulder x 1 year, no known injury, no previous shoulder surgery. States of difficulty with ROM         HPI:    Patient is 60 y.o. male complaining of left shoulder pain and weakness for 1 year. He denies a specific traumatic injury to the left shoulder. Previous treatments include rest, ice, and anti-inflammatory medication and HEP without much relief. He denies any other orthopedic complaints. ROS:    Skin: (-) rash,(-) psoriasis,(-) eczema, (-)skin cancer. Neurologic: (-)numbness, (-)tingling, (-)headaches, (-) LOC. Cardiovascular: (-) Chest pain, (-) swelling in legs/feet, (-) SOB, (-) cramping in legs/feet with walking.     All other review of systems negative except stated above or in HPI      Past Medical History:   Diagnosis Date    COPD (chronic obstructive pulmonary disease) ([facility] [address]     Depression     Hyperlipidemia      Past Surgical History:   Procedure Laterality Date    CHOLECYSTECTOMY, LAPAROSCOPIC  7-6-15       Current Outpatient Medications:     QUEtiapine (SEROQUEL) 100 MG tablet, Take 1 tablet by mouth nightly, Disp: , Rfl:     nicotine (NICODERM CQ) 21 MG/24HR, Place 1 patch onto the skin every 24 hours, Disp: 42 patch, Rfl: 0    pantoprazole (PROTONIX) 40 MG tablet, TAKE ONE TABLET BY MOUTH EVERY DAY WITH BREAKFAST, Disp: 90 tablet, Rfl: 0    folic acid (FOLVITE) 1 MG tablet, Take 1 tablet by mouth daily, Disp: 90 tablet, Rfl: 1    Cholecalciferol (VITAMIN D3) 50 MCG (2000 UT) CAPS, Take 1 capsule by mouth daily, Disp: 30 capsule, Rfl: 5    furosemide (LASIX) 20 MG tablet, Take 1 tablet by mouth daily as needed (leg swelling), Disp: 90 tablet, Rfl: 1    atorvastatin (LIPITOR) 20 MG tablet, Take 1 tablet by mouth daily, Disp: 90 tablet, Rfl: 1    doxepin (SINEQUAN) 100 MG capsule, , Disp: , Rfl:     albuterol sulfate HFA (PROAIR HFA) 108 (90 Base) MCG/ACT inhaler, INHALE TWO PUFFS INTO THE LUNGS EVERY 6 HOURS AS NEEDED FOR

## 2023-06-01 ENCOUNTER — TELEPHONE (OUTPATIENT)
Dept: ORTHOPEDIC SURGERY | Age: 58
End: 2023-06-01

## 2023-06-01 DIAGNOSIS — F40.240 CLAUSTROPHOBIA: ICD-10-CM

## 2023-06-01 DIAGNOSIS — M75.102 NONTRAUMATIC TEAR OF LEFT ROTATOR CUFF, UNSPECIFIED TEAR EXTENT: Primary | ICD-10-CM

## 2023-06-01 NOTE — TELEPHONE ENCOUNTER
Patient is pending an MRI appointment on 06/09/2023 and states he is claustrophobic and would like something called into the pharmacy to take before his MRI.     Please advise        [id number] - Chris Fernández - [address] [facility] [phone] - F [phone]   [place] [address], [address] [place] [address]

## 2023-06-02 RX ORDER — DIAZEPAM 5 MG/1
5 TABLET ORAL PRN
Qty: 1 TABLET | Refills: 0 | Status: SHIPPED | OUTPATIENT
Start: 2023-06-02 | End: 2023-06-03

## 2023-06-19 ENCOUNTER — TELEPHONE (OUTPATIENT)
Dept: ORTHOPEDIC SURGERY | Age: 58
End: 2023-06-19

## 2023-07-14 ENCOUNTER — OFFICE VISIT (OUTPATIENT)
Dept: ORTHOPEDIC SURGERY | Age: 58
End: 2023-07-14

## 2023-07-14 VITALS — TEMPERATURE: 98 F | HEIGHT: 72 IN | WEIGHT: 250 LBS | BODY MASS INDEX: 33.86 KG/M2

## 2023-07-14 DIAGNOSIS — M75.102 NONTRAUMATIC TEAR OF LEFT ROTATOR CUFF, UNSPECIFIED TEAR EXTENT: ICD-10-CM

## 2023-07-14 DIAGNOSIS — M75.02 ADHESIVE CAPSULITIS OF LEFT SHOULDER: Primary | ICD-10-CM

## 2023-07-14 DIAGNOSIS — Z71.82 EXERCISE COUNSELING: ICD-10-CM

## 2023-07-14 RX ORDER — TRIAMCINOLONE ACETONIDE 40 MG/ML
40 INJECTION, SUSPENSION INTRA-ARTICULAR; INTRAMUSCULAR ONCE
Status: COMPLETED | OUTPATIENT
Start: 2023-07-14 | End: 2023-07-14

## 2023-07-14 RX ADMIN — TRIAMCINOLONE ACETONIDE 40 MG: 40 INJECTION, SUSPENSION INTRA-ARTICULAR; INTRAMUSCULAR at 14:29

## 2023-07-14 NOTE — PROGRESS NOTES
condition. He was educated with options in detail including nutrition, joining a health club/ weight loss program, and use of cardio equipment such as the Arc Trainer and the importance of use as well as range of motion and HEP exercises for weight loss and general health. Darryl Negron DO          30 minutes was spent with patient. 50% or greater was spent counseling the patient.

## 2023-08-25 ENCOUNTER — APPOINTMENT (OUTPATIENT)
Dept: CT IMAGING | Age: 58
End: 2023-08-25
Payer: OTHER MISCELLANEOUS

## 2023-08-25 ENCOUNTER — HOSPITAL ENCOUNTER (EMERGENCY)
Age: 58
Discharge: HOME | End: 2023-08-25
Payer: OTHER MISCELLANEOUS

## 2023-08-25 VITALS
OXYGEN SATURATION: 95 % | DIASTOLIC BLOOD PRESSURE: 68 MMHG | RESPIRATION RATE: 20 BRPM | SYSTOLIC BLOOD PRESSURE: 115 MMHG | TEMPERATURE: 98 F | WEIGHT: 257 LBS | BODY MASS INDEX: 34.86 KG/M2 | HEART RATE: 87 BPM

## 2023-08-25 DIAGNOSIS — S16.1XXA ACUTE STRAIN OF NECK MUSCLE, INITIAL ENCOUNTER: ICD-10-CM

## 2023-08-25 DIAGNOSIS — S09.90XA CLOSED HEAD INJURY, INITIAL ENCOUNTER: ICD-10-CM

## 2023-08-25 DIAGNOSIS — S39.012A STRAIN OF LUMBAR REGION, INITIAL ENCOUNTER: ICD-10-CM
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
## 2023-08-25 PROCEDURE — 72125 CT NECK SPINE W/O DYE: CPT

## 2023-08-25 PROCEDURE — 6360000002 HC RX W HCPCS: Performed by: PHYSICIAN ASSISTANT

## 2023-08-25 PROCEDURE — 99284 EMERGENCY DEPT VISIT MOD MDM: CPT

## 2023-08-25 PROCEDURE — 72131 CT LUMBAR SPINE W/O DYE: CPT

## 2023-08-25 PROCEDURE — 6370000000 HC RX 637 (ALT 250 FOR IP): Performed by: PHYSICIAN ASSISTANT

## 2023-08-25 PROCEDURE — 70450 CT HEAD/BRAIN W/O DYE: CPT

## 2023-08-25 PROCEDURE — 96372 THER/PROPH/DIAG INJ SC/IM: CPT

## 2023-08-25 RX ORDER — ACETAMINOPHEN 500 MG
1000 TABLET ORAL 3 TIMES DAILY PRN
Qty: 42 TABLET | Refills: 0 | Status: SHIPPED | OUTPATIENT
Start: 2023-08-25 | End: 2023-09-01

## 2023-08-25 RX ORDER — ACETAMINOPHEN 500 MG
1000 TABLET ORAL ONCE
Status: COMPLETED | OUTPATIENT
Start: 2023-08-25 | End: 2023-08-25

## 2023-08-25 RX ORDER — NAPROXEN 500 MG/1
500 TABLET ORAL 2 TIMES DAILY PRN
Qty: 14 TABLET | Refills: 0 | Status: SHIPPED | OUTPATIENT
Start: 2023-08-25 | End: 2023-09-01

## 2023-08-25 RX ORDER — CYCLOBENZAPRINE HCL 5 MG
10 TABLET ORAL ONCE
Status: COMPLETED | OUTPATIENT
Start: 2023-08-25 | End: 2023-08-25

## 2023-08-25 RX ORDER — DEXAMETHASONE SODIUM PHOSPHATE 10 MG/ML
10 INJECTION INTRAMUSCULAR; INTRAVENOUS ONCE
Status: COMPLETED | OUTPATIENT
Start: 2023-08-25 | End: 2023-08-25

## 2023-08-25 RX ORDER — LIDOCAINE 50 MG/G
1 PATCH TOPICAL EVERY 24 HOURS
Qty: 10 PATCH | Refills: 0 | Status: SHIPPED | OUTPATIENT
Start: 2023-08-25 | End: 2023-09-04

## 2023-08-25 RX ADMIN — CYCLOBENZAPRINE HYDROCHLORIDE 10 MG: 5 TABLET, FILM COATED ORAL at 16:28

## 2023-08-25 RX ADMIN — DEXAMETHASONE SODIUM PHOSPHATE 10 MG: 10 INJECTION INTRAMUSCULAR; INTRAVENOUS at 16:29

## 2023-08-25 RX ADMIN — ACETAMINOPHEN 1000 MG: 500 TABLET ORAL at 16:28

## 2023-08-25 ASSESSMENT — PAIN SCALES - GENERAL
PAINLEVEL_OUTOF10: 4
PAINLEVEL_OUTOF10: 3
PAINLEVEL_OUTOF10: 3

## 2023-08-25 ASSESSMENT — PAIN DESCRIPTION - LOCATION
LOCATION: EAR
LOCATION: HEAD;NECK

## 2023-08-25 ASSESSMENT — LIFESTYLE VARIABLES: HOW MANY STANDARD DRINKS CONTAINING ALCOHOL DO YOU HAVE ON A TYPICAL DAY: PATIENT DOES NOT DRINK

## 2023-08-25 ASSESSMENT — PAIN DESCRIPTION - ORIENTATION: ORIENTATION: RIGHT

## 2023-08-25 ASSESSMENT — PAIN DESCRIPTION - DESCRIPTORS: DESCRIPTORS: DISCOMFORT;THROBBING

## 2023-08-25 ASSESSMENT — PAIN - FUNCTIONAL ASSESSMENT
PAIN_FUNCTIONAL_ASSESSMENT: 0-10
PAIN_FUNCTIONAL_ASSESSMENT: 0-10

## 2023-08-25 ASSESSMENT — PAIN DESCRIPTION - PAIN TYPE: TYPE: ACUTE PAIN

## 2023-08-25 NOTE — ED PROVIDER NOTES
Caused by amount of drops used OD. May need surgery in future to correct. Will not correct on it's own.
Continue Pred qid OD. Discontinue Moxi.
Monitor.
Patient understands condition, prognosis and need for follow up care.
Recommend decreasing Prednisolone 4x day OD.
Recommended artificial tears to use as directed.
Recommended continue artificial tears to use: 1 drop 4x a day in both eyes.
See #2.
VA decreased OD due to inflammation.
Medications     Discharge Medication List as of 8/25/2023  4:53 PM        START taking these medications    Details   lidocaine (LIDODERM) 5 % Place 1 patch onto the skin every 24 hours for 10 days 12 hours on, 12 hours off., Disp-10 patch, R-0Normal      acetaminophen (TYLENOL) 500 MG tablet Take 2 tablets by mouth 3 times daily as needed for Pain or Fever, Disp-42 tablet, R-0Normal      naproxen (NAPROSYN) 500 MG tablet Take 1 tablet by mouth 2 times daily as needed for Pain, Disp-14 tablet, R-0Normal           Electronically signed by Sanjiv Strickland PA-C   DD: 8/25/23  **This report was transcribed using voice recognition software. Every effort was made to ensure accuracy; however, inadvertent computerized transcription errors may be present. END OF ED PROVIDER NOTE  .       Sanjiv Strickland PA-C  08/25/23 1169

## 2024-04-25 PROBLEM — R25.1 TREMOR OF BOTH HANDS: Status: ACTIVE | Noted: 2024-04-25

## 2024-04-25 PROBLEM — F32.A ANXIETY AND DEPRESSION: Status: ACTIVE | Noted: 2024-04-25

## 2024-04-25 PROBLEM — F41.9 ANXIETY AND DEPRESSION: Status: ACTIVE | Noted: 2024-04-25

## 2024-10-11 DIAGNOSIS — M25.512 ACUTE PAIN OF LEFT SHOULDER: ICD-10-CM

## 2024-10-11 DIAGNOSIS — M75.02 ADHESIVE CAPSULITIS OF LEFT SHOULDER: Primary | ICD-10-CM

## 2024-10-14 ENCOUNTER — OFFICE VISIT (OUTPATIENT)
Dept: ORTHOPEDIC SURGERY | Age: 59
End: 2024-10-14

## 2024-10-14 VITALS — WEIGHT: 248 LBS | HEIGHT: 72 IN | TEMPERATURE: 98 F | BODY MASS INDEX: 33.59 KG/M2

## 2024-10-14 DIAGNOSIS — M75.52 ACUTE BURSITIS OF LEFT SHOULDER: ICD-10-CM

## 2024-10-14 DIAGNOSIS — M25.812 SHOULDER IMPINGEMENT, LEFT: Primary | ICD-10-CM

## 2024-10-14 NOTE — PROGRESS NOTES
bilaterally, and motor and sensation is intact to median, ulnar, and radial, musclocutaneus, and axillary nerve distribution and grossly symmetric bilaterally.  There is cap refill noted less than two seconds in all digits. There is not edema of the bilateral upper extremities.  There is not varicosities noted in the distal extremities.      Lymph:  Upon palpation,  there is no lymphadenopathy noted in bilateral upper extremities.      Musculoskeletal:  Gait: normal; examination of the nails and digits reveal no cyanosis or clubbing.      Cervical Exam:  On physical exam, Marcin Perdue is well-developed, well-nourished, oriented to person, place and time.  his gait is normal.  On evaluation of hiscervical spine, He has full range of motion of the cervical spine without pain.  There is no cervical tenderness to palpation.     Shoulder Exam:  On evaluation of his bilaterally upper extremities, his left shoulder has no deformity.  There is tenderness upon palpation of the anterior and lateral shoulder.  There is not evidence of scapular dyskinesis.  There is not muscle atrophy in shoulder girdle. The range of motion for the Right Shoulder is 160/45/T8 and for the Left shoulder is 140/30/T12.  Right shoulder Motor strength is 5/5 in the supraspinatus, 5/5 internal rotation and 5/5 in external rotation, and Left shoulder motor strength 5/5 in supraspinatus, 5/5 in internal rotation, 5/5 in external rotation.        Right shoulder:  negative Impingement , negative Naranjo ,negative  Speeds,negative  Apprehension ,negative Jacobs Load Shift, negative Emiliana manuver, negative Cross arm test.     Left shoulder:  positive Impingement , positive Naranjo ,negative  Speeds,negative  Apprehension ,negative Jacobs Load Shift, negative Emiliana manuver, negative Cross arm test.     XRAY:    Normal findings    MRI:    Not performed    Radiographic findings reviewed with patient    Impression:   Encounter Diagnoses   Name Primary?

## 2024-10-16 RX ORDER — TRIAMCINOLONE ACETONIDE 40 MG/ML
40 INJECTION, SUSPENSION INTRA-ARTICULAR; INTRAMUSCULAR ONCE
Status: COMPLETED | OUTPATIENT
Start: 2024-10-16 | End: 2024-10-16

## 2024-10-16 RX ADMIN — TRIAMCINOLONE ACETONIDE 40 MG: 40 INJECTION, SUSPENSION INTRA-ARTICULAR; INTRAMUSCULAR at 20:46

## 2024-12-05 PROBLEM — K59.01 SLOW TRANSIT CONSTIPATION: Status: ACTIVE | Noted: 2024-12-05

## 2024-12-05 PROBLEM — G89.29 CHRONIC LEFT SHOULDER PAIN: Status: ACTIVE | Noted: 2024-12-05

## 2024-12-05 PROBLEM — M25.512 CHRONIC LEFT SHOULDER PAIN: Status: ACTIVE | Noted: 2024-12-05

## 2025-04-04 ENCOUNTER — HOSPITAL ENCOUNTER (OUTPATIENT)
Dept: CT IMAGING | Age: 60
Discharge: HOME | End: 2025-04-04
Payer: MEDICARE

## 2025-04-04 DIAGNOSIS — K59.01 SLOW TRANSIT CONSTIPATION: ICD-10-CM

## 2025-04-04 DIAGNOSIS — R10.84 GENERALIZED ABDOMINAL PAIN: ICD-10-CM

## 2025-04-04 DIAGNOSIS — R19.8 CHANGE IN BOWEL MOVEMENT: ICD-10-CM

## 2025-04-04 DIAGNOSIS — R11.0 NAUSEA: ICD-10-CM

## 2025-04-04 DIAGNOSIS — R14.0 BLOATING: ICD-10-CM

## 2025-04-04 PROCEDURE — 6360000004 HC RX CONTRAST MEDICATION: Performed by: RADIOLOGY

## 2025-04-04 PROCEDURE — 74177 CT ABD & PELVIS W/CONTRAST: CPT

## 2025-04-04 RX ORDER — IOPAMIDOL 755 MG/ML
93 INJECTION, SOLUTION INTRAVASCULAR
Status: COMPLETED | OUTPATIENT
Start: 2025-04-04 | End: 2025-04-04

## 2025-04-04 RX ADMIN — IOPAMIDOL 93 ML: 755 INJECTION, SOLUTION INTRAVENOUS at 14:43

## 2025-08-18 LAB — HBA1C MFR BLD: 6.1 % (ref 4–5.6)

## 2025-08-19 LAB
ALBUMIN SERPL-MCNC: 4.5 G/DL (ref 3.5–5.2)
ALP SERPL-CCNC: 79 U/L (ref 40–129)
ALT SERPL-CCNC: 14 U/L (ref 0–50)
ANION GAP SERPL CALCULATED.3IONS-SCNC: 14 MMOL/L (ref 7–16)
AST SERPL-CCNC: 20 U/L (ref 0–50)
BILIRUB SERPL-MCNC: 0.4 MG/DL (ref 0–1.2)
BUN SERPL-MCNC: 12 MG/DL (ref 8–23)
CALCIUM SERPL-MCNC: 10 MG/DL (ref 8.8–10.2)
CHLORIDE SERPL-SCNC: 104 MMOL/L (ref 98–107)
CHOLEST SERPL-MCNC: 167 MG/DL
CO2 SERPL-SCNC: 22 MMOL/L (ref 22–29)
CREAT SERPL-MCNC: 1.2 MG/DL (ref 0.7–1.2)
GFR, ESTIMATED: 71 ML/MIN/1.73M2
GLUCOSE SERPL-MCNC: 105 MG/DL (ref 74–99)
HDLC SERPL-MCNC: 35 MG/DL
LDLC SERPL CALC-MCNC: 94 MG/DL
POTASSIUM SERPL-SCNC: 4.7 MMOL/L (ref 3.5–5.1)
PROT SERPL-MCNC: 7.5 G/DL (ref 6.4–8.3)
SODIUM SERPL-SCNC: 140 MMOL/L (ref 136–145)
TRIGL SERPL-MCNC: 188 MG/DL
VLDLC SERPL CALC-MCNC: 38 MG/DL